# Patient Record
Sex: FEMALE | Race: WHITE | Employment: OTHER | URBAN - NONMETROPOLITAN AREA
[De-identification: names, ages, dates, MRNs, and addresses within clinical notes are randomized per-mention and may not be internally consistent; named-entity substitution may affect disease eponyms.]

---

## 2017-05-02 ENCOUNTER — NURSE ONLY (OUTPATIENT)
Dept: LAB | Age: 67
End: 2017-05-02
Payer: MEDICARE

## 2017-05-02 ENCOUNTER — OFFICE VISIT (OUTPATIENT)
Dept: FAMILY MEDICINE CLINIC | Age: 67
End: 2017-05-02
Payer: MEDICARE

## 2017-05-02 VITALS
HEIGHT: 61 IN | SYSTOLIC BLOOD PRESSURE: 118 MMHG | HEART RATE: 88 BPM | WEIGHT: 119 LBS | BODY MASS INDEX: 22.47 KG/M2 | DIASTOLIC BLOOD PRESSURE: 68 MMHG

## 2017-05-02 DIAGNOSIS — J30.1 SEASONAL ALLERGIC RHINITIS DUE TO POLLEN: ICD-10-CM

## 2017-05-02 DIAGNOSIS — K57.30 SIGMOID DIVERTICULOSIS: ICD-10-CM

## 2017-05-02 DIAGNOSIS — M54.50 CHRONIC MIDLINE LOW BACK PAIN WITHOUT SCIATICA: ICD-10-CM

## 2017-05-02 DIAGNOSIS — I47.1 SUPRAVENTRICULAR TACHYCARDIA (HCC): Primary | ICD-10-CM

## 2017-05-02 DIAGNOSIS — D18.09 HEMANGIOMA OF FACE: ICD-10-CM

## 2017-05-02 DIAGNOSIS — G47.33 OBSTRUCTIVE SLEEP APNEA: ICD-10-CM

## 2017-05-02 DIAGNOSIS — R73.01 IMPAIRED FASTING BLOOD SUGAR: ICD-10-CM

## 2017-05-02 DIAGNOSIS — G89.29 CHRONIC MIDLINE LOW BACK PAIN WITHOUT SCIATICA: ICD-10-CM

## 2017-05-02 DIAGNOSIS — E78.00 PURE HYPERCHOLESTEROLEMIA: ICD-10-CM

## 2017-05-02 DIAGNOSIS — Z23 NEED FOR VACCINATION: Primary | ICD-10-CM

## 2017-05-02 DIAGNOSIS — I10 ESSENTIAL HYPERTENSION: ICD-10-CM

## 2017-05-02 DIAGNOSIS — R42 DIZZINESS: ICD-10-CM

## 2017-05-02 PROCEDURE — 1123F ACP DISCUSS/DSCN MKR DOCD: CPT | Performed by: FAMILY MEDICINE

## 2017-05-02 PROCEDURE — 1036F TOBACCO NON-USER: CPT | Performed by: FAMILY MEDICINE

## 2017-05-02 PROCEDURE — G8419 CALC BMI OUT NRM PARAM NOF/U: HCPCS | Performed by: FAMILY MEDICINE

## 2017-05-02 PROCEDURE — 90471 IMMUNIZATION ADMIN: CPT | Performed by: FAMILY MEDICINE

## 2017-05-02 PROCEDURE — 1090F PRES/ABSN URINE INCON ASSESS: CPT | Performed by: FAMILY MEDICINE

## 2017-05-02 PROCEDURE — G8427 DOCREV CUR MEDS BY ELIG CLIN: HCPCS | Performed by: FAMILY MEDICINE

## 2017-05-02 PROCEDURE — 3017F COLORECTAL CA SCREEN DOC REV: CPT | Performed by: FAMILY MEDICINE

## 2017-05-02 PROCEDURE — 99214 OFFICE O/P EST MOD 30 MIN: CPT | Performed by: FAMILY MEDICINE

## 2017-05-02 PROCEDURE — 3014F SCREEN MAMMO DOC REV: CPT | Performed by: FAMILY MEDICINE

## 2017-05-02 PROCEDURE — G8399 PT W/DXA RESULTS DOCUMENT: HCPCS | Performed by: FAMILY MEDICINE

## 2017-05-02 PROCEDURE — 4040F PNEUMOC VAC/ADMIN/RCVD: CPT | Performed by: FAMILY MEDICINE

## 2017-05-02 ASSESSMENT — ENCOUNTER SYMPTOMS
EYES NEGATIVE: 1
ALLERGIC/IMMUNOLOGIC NEGATIVE: 1
GASTROINTESTINAL NEGATIVE: 1
RESPIRATORY NEGATIVE: 1

## 2017-05-09 ENCOUNTER — OFFICE VISIT (OUTPATIENT)
Dept: OTOLARYNGOLOGY | Age: 67
End: 2017-05-09
Payer: MEDICARE

## 2017-05-09 VITALS
TEMPERATURE: 98.1 F | SYSTOLIC BLOOD PRESSURE: 128 MMHG | HEIGHT: 61 IN | BODY MASS INDEX: 22.47 KG/M2 | WEIGHT: 119 LBS | DIASTOLIC BLOOD PRESSURE: 78 MMHG | HEART RATE: 74 BPM

## 2017-05-09 DIAGNOSIS — D18.09 HEMANGIOMA OF FACE: ICD-10-CM

## 2017-05-09 DIAGNOSIS — R49.0 DYSPHONIA: Primary | ICD-10-CM

## 2017-05-09 PROCEDURE — 1090F PRES/ABSN URINE INCON ASSESS: CPT | Performed by: OTOLARYNGOLOGY

## 2017-05-09 PROCEDURE — 3017F COLORECTAL CA SCREEN DOC REV: CPT | Performed by: OTOLARYNGOLOGY

## 2017-05-09 PROCEDURE — 3014F SCREEN MAMMO DOC REV: CPT | Performed by: OTOLARYNGOLOGY

## 2017-05-09 PROCEDURE — 1123F ACP DISCUSS/DSCN MKR DOCD: CPT | Performed by: OTOLARYNGOLOGY

## 2017-05-09 PROCEDURE — G8419 CALC BMI OUT NRM PARAM NOF/U: HCPCS | Performed by: OTOLARYNGOLOGY

## 2017-05-09 PROCEDURE — G8427 DOCREV CUR MEDS BY ELIG CLIN: HCPCS | Performed by: OTOLARYNGOLOGY

## 2017-05-09 PROCEDURE — 1036F TOBACCO NON-USER: CPT | Performed by: OTOLARYNGOLOGY

## 2017-05-09 PROCEDURE — 4040F PNEUMOC VAC/ADMIN/RCVD: CPT | Performed by: OTOLARYNGOLOGY

## 2017-05-09 PROCEDURE — 31575 DIAGNOSTIC LARYNGOSCOPY: CPT | Performed by: OTOLARYNGOLOGY

## 2017-05-09 PROCEDURE — G8399 PT W/DXA RESULTS DOCUMENT: HCPCS | Performed by: OTOLARYNGOLOGY

## 2017-05-09 PROCEDURE — 99202 OFFICE O/P NEW SF 15 MIN: CPT | Performed by: OTOLARYNGOLOGY

## 2017-05-21 ENCOUNTER — OFFICE VISIT (OUTPATIENT)
Dept: PRIMARY CARE CLINIC | Age: 67
End: 2017-05-21
Payer: MEDICARE

## 2017-05-21 VITALS
WEIGHT: 120 LBS | DIASTOLIC BLOOD PRESSURE: 80 MMHG | TEMPERATURE: 97.8 F | HEART RATE: 67 BPM | HEIGHT: 61 IN | SYSTOLIC BLOOD PRESSURE: 120 MMHG | BODY MASS INDEX: 22.66 KG/M2 | OXYGEN SATURATION: 98 % | RESPIRATION RATE: 14 BRPM

## 2017-05-21 DIAGNOSIS — J30.2 SEASONAL ALLERGIC RHINITIS, UNSPECIFIED ALLERGIC RHINITIS TRIGGER: Primary | ICD-10-CM

## 2017-05-21 PROCEDURE — G8419 CALC BMI OUT NRM PARAM NOF/U: HCPCS | Performed by: FAMILY MEDICINE

## 2017-05-21 PROCEDURE — 1036F TOBACCO NON-USER: CPT | Performed by: FAMILY MEDICINE

## 2017-05-21 PROCEDURE — G8427 DOCREV CUR MEDS BY ELIG CLIN: HCPCS | Performed by: FAMILY MEDICINE

## 2017-05-21 PROCEDURE — 1123F ACP DISCUSS/DSCN MKR DOCD: CPT | Performed by: FAMILY MEDICINE

## 2017-05-21 PROCEDURE — 99213 OFFICE O/P EST LOW 20 MIN: CPT | Performed by: FAMILY MEDICINE

## 2017-05-21 PROCEDURE — 3014F SCREEN MAMMO DOC REV: CPT | Performed by: FAMILY MEDICINE

## 2017-05-21 PROCEDURE — G8399 PT W/DXA RESULTS DOCUMENT: HCPCS | Performed by: FAMILY MEDICINE

## 2017-05-21 PROCEDURE — 3017F COLORECTAL CA SCREEN DOC REV: CPT | Performed by: FAMILY MEDICINE

## 2017-05-21 PROCEDURE — 4040F PNEUMOC VAC/ADMIN/RCVD: CPT | Performed by: FAMILY MEDICINE

## 2017-05-21 PROCEDURE — 1090F PRES/ABSN URINE INCON ASSESS: CPT | Performed by: FAMILY MEDICINE

## 2017-05-21 RX ORDER — LORATADINE 10 MG/1
10 TABLET ORAL DAILY
Qty: 30 TABLET | Refills: 3 | Status: SHIPPED | OUTPATIENT
Start: 2017-05-21 | End: 2017-11-01 | Stop reason: ALTCHOICE

## 2017-05-21 RX ORDER — FLUTICASONE PROPIONATE 50 MCG
1 SPRAY, SUSPENSION (ML) NASAL DAILY
Qty: 1 BOTTLE | Refills: 3 | Status: SHIPPED | OUTPATIENT
Start: 2017-05-21 | End: 2017-11-01 | Stop reason: ALTCHOICE

## 2017-05-21 ASSESSMENT — ENCOUNTER SYMPTOMS
CONSTIPATION: 0
WHEEZING: 0
TROUBLE SWALLOWING: 0
COUGH: 0
SORE THROAT: 0
VOMITING: 0
EYE DISCHARGE: 0
RHINORRHEA: 1
DIARRHEA: 0
SINUS PRESSURE: 1
SINUS COMPLAINT: 1
NAUSEA: 0
ABDOMINAL PAIN: 0
SHORTNESS OF BREATH: 0
EYE REDNESS: 0

## 2017-08-04 ENCOUNTER — TELEPHONE (OUTPATIENT)
Dept: OTOLARYNGOLOGY | Age: 67
End: 2017-08-04

## 2017-08-04 NOTE — TELEPHONE ENCOUNTER
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 She stated she did see Dr. Belén Patiño for which I saw a consult note in media suggesting that she see Dr. Adilene Turner to consult interventional embolization which should help prevent the hemangioma from continuing to grow . When I called Jerod Sandoval I was pleased to know that she was going to go see Dr. Terra Frost on Wed Aug 9th 2017 for the emobilization consult and let her know I would call her again soon to see how that went and when she will be having this done.

## 2017-08-18 NOTE — TELEPHONE ENCOUNTER
I spoke to Harriett Manzo and she states she will need a swallow study per Dr. Paris Mathias phone number [phone] to ask for Essence Machuca  They were to call me before 2 on Friday with an order for the swallow study so I can schedule this for her I received no call and attempted to call Essence Machuca and got voicemail .

## 2017-08-21 ENCOUNTER — TELEPHONE (OUTPATIENT)
Dept: FAMILY MEDICINE CLINIC | Age: 67
End: 2017-08-21

## 2017-08-22 NOTE — TELEPHONE ENCOUNTER
Spoke to Cristian Gibbs at [phone]. He stated Shama Stephens was not in today but would have her call me Wednesday 8/23/17 when she was.

## 2017-08-29 ENCOUNTER — HOSPITAL ENCOUNTER (OUTPATIENT)
Dept: GENERAL RADIOLOGY | Age: 67
Discharge: HOME | End: 2017-08-29
Payer: MEDICARE

## 2017-08-29 ENCOUNTER — HOSPITAL ENCOUNTER (OUTPATIENT)
Dept: SPEECH THERAPY | Age: 67
Setting detail: THERAPIES SERIES
Discharge: HOME | End: 2017-08-29
Payer: MEDICARE

## 2017-08-29 DIAGNOSIS — R13.10 DYSPHAGIA, UNSPECIFIED TYPE: ICD-10-CM

## 2017-08-29 DIAGNOSIS — T88.4XXA: ICD-10-CM

## 2017-08-29 DIAGNOSIS — Q27.9 VASCULAR ANOMALIES, CONGENITAL: ICD-10-CM

## 2017-08-29 DIAGNOSIS — Q27.9 VENOUS MALFORMATION: Primary | ICD-10-CM

## 2017-08-29 PROCEDURE — G8997 SWALLOW GOAL STATUS: HCPCS | Performed by: SPEECH-LANGUAGE PATHOLOGIST

## 2017-08-29 PROCEDURE — 74230 X-RAY XM SWLNG FUNCJ C+: CPT

## 2017-08-29 PROCEDURE — G8998 SWALLOW D/C STATUS: HCPCS | Performed by: SPEECH-LANGUAGE PATHOLOGIST

## 2017-08-29 PROCEDURE — G8996 SWALLOW CURRENT STATUS: HCPCS | Performed by: SPEECH-LANGUAGE PATHOLOGIST

## 2017-08-29 PROCEDURE — 92611 MOTION FLUOROSCOPY/SWALLOW: CPT | Performed by: SPEECH-LANGUAGE PATHOLOGIST

## 2017-09-18 ENCOUNTER — NURSE ONLY (OUTPATIENT)
Dept: LAB | Age: 67
End: 2017-09-18
Payer: MEDICARE

## 2017-09-18 DIAGNOSIS — Z23 NEED FOR VACCINATION: Primary | ICD-10-CM

## 2017-09-18 PROCEDURE — G0008 ADMIN INFLUENZA VIRUS VAC: HCPCS | Performed by: FAMILY MEDICINE

## 2017-09-18 PROCEDURE — 90662 IIV NO PRSV INCREASED AG IM: CPT | Performed by: FAMILY MEDICINE

## 2017-09-18 PROCEDURE — 99999 PR OFFICE/OUTPT VISIT,PROCEDURE ONLY: CPT | Performed by: FAMILY MEDICINE

## 2017-10-23 ENCOUNTER — TELEPHONE (OUTPATIENT)
Dept: FAMILY MEDICINE CLINIC | Age: 67
End: 2017-10-23

## 2017-10-23 NOTE — TELEPHONE ENCOUNTER
Pt calling stating that she needs to speak with Gerald Reyez about getting treatment in [place]. Pt will go into any further detail with writer. Please call pt and advise.

## 2017-10-24 NOTE — TELEPHONE ENCOUNTER
Returned call-patient requesting a handicap placard script and a letter for [employer] for assistance in garbage  d/t up coming surgery-will mail to patient

## 2017-11-01 ENCOUNTER — OFFICE VISIT (OUTPATIENT)
Dept: PRIMARY CARE CLINIC | Age: 67
End: 2017-11-01
Payer: MEDICARE

## 2017-11-01 VITALS
HEIGHT: 61 IN | WEIGHT: 117.4 LBS | RESPIRATION RATE: 14 BRPM | TEMPERATURE: 99.3 F | SYSTOLIC BLOOD PRESSURE: 124 MMHG | HEART RATE: 88 BPM | OXYGEN SATURATION: 97 % | DIASTOLIC BLOOD PRESSURE: 86 MMHG | BODY MASS INDEX: 22.16 KG/M2

## 2017-11-01 DIAGNOSIS — J06.9 UPPER RESPIRATORY TRACT INFECTION, UNSPECIFIED TYPE: Primary | ICD-10-CM

## 2017-11-01 PROCEDURE — G8484 FLU IMMUNIZE NO ADMIN: HCPCS | Performed by: FAMILY MEDICINE

## 2017-11-01 PROCEDURE — 1090F PRES/ABSN URINE INCON ASSESS: CPT | Performed by: FAMILY MEDICINE

## 2017-11-01 PROCEDURE — 3017F COLORECTAL CA SCREEN DOC REV: CPT | Performed by: FAMILY MEDICINE

## 2017-11-01 PROCEDURE — 1123F ACP DISCUSS/DSCN MKR DOCD: CPT | Performed by: FAMILY MEDICINE

## 2017-11-01 PROCEDURE — 3014F SCREEN MAMMO DOC REV: CPT | Performed by: FAMILY MEDICINE

## 2017-11-01 PROCEDURE — 1036F TOBACCO NON-USER: CPT | Performed by: FAMILY MEDICINE

## 2017-11-01 PROCEDURE — 99213 OFFICE O/P EST LOW 20 MIN: CPT | Performed by: FAMILY MEDICINE

## 2017-11-01 PROCEDURE — 4040F PNEUMOC VAC/ADMIN/RCVD: CPT | Performed by: FAMILY MEDICINE

## 2017-11-01 PROCEDURE — G8427 DOCREV CUR MEDS BY ELIG CLIN: HCPCS | Performed by: FAMILY MEDICINE

## 2017-11-01 PROCEDURE — G8399 PT W/DXA RESULTS DOCUMENT: HCPCS | Performed by: FAMILY MEDICINE

## 2017-11-01 PROCEDURE — G8420 CALC BMI NORM PARAMETERS: HCPCS | Performed by: FAMILY MEDICINE

## 2017-11-01 RX ORDER — AZITHROMYCIN 250 MG/1
TABLET, FILM COATED ORAL
Qty: 1 PACKET | Refills: 0 | Status: SHIPPED | OUTPATIENT
Start: 2017-11-01 | End: 2017-11-05

## 2017-11-01 NOTE — PROGRESS NOTES
[facility]             [address] [place] [address], [address]                        Telephone [phone]             Fax [phone]       Kiah Manriquez  1950  MRN:  [id number]  Date of visit:  11/1/17    Subjective:    Kiah Manriquez is a 69 y.o.  female who presents to [facility] today (11/1/17) for evaluation of:  Nasal Congestion (with yellow colored nasal  drainage,started yesterday)      She states that she has had nasal congestion and drainage since yesterday. She states that she feels warm, but she did not check her temperature at home. She is anxious about getting better as soon as possible. She has surgery scheduled on 11/14/2017. 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 Current medications are:  Current Outpatient Prescriptions   Medication Sig Dispense Refill    Handicap Placard MISC by Does not apply route Expires: 10/24/2022 1 each 0    Psyllium (METAMUCIL FIBER PO) Take by mouth      DIGESTIVE AIDS MIXTURE PO Take 1 capsule by mouth 2 times daily        No current facility-administered medications for this visit. She is allergic to penicillins; sulfa antibiotics; and e-mycin [erythromycin]. She has the following problem list:  Patient Active Problem List   Diagnosis    Mitral valve prolapse    Supraventricular tachycardia ([address]    Hypertension    Hyperlipidemia    Allergic rhinitis    Impaired fasting blood sugar    Sigmoid diverticulosis    Obstructive sleep apnea    Cataract, post subcapsular polar senile    Hemangioma of face        She  reports that she has never smoked.  She has never used smokeless tobacco.      Objective:    Vitals:    11/01/17 1039   BP: 124/86   Pulse: 88   Resp: 14   Temp: 99.3 °F (37.4 °C)   SpO2: 97%     SpO2: 97 %       Body mass

## 2017-11-27 ENCOUNTER — TELEPHONE (OUTPATIENT)
Dept: FAMILY MEDICINE CLINIC | Age: 67
End: 2017-11-27

## 2017-11-27 NOTE — TELEPHONE ENCOUNTER
Pt calling stating she needs to be worked in with [facility] ASAP, states she had a trach put in and needs to discuss this situation with him, please call pt at above number with appt.

## 2017-11-28 ENCOUNTER — TELEPHONE (OUTPATIENT)
Dept: FAMILY MEDICINE CLINIC | Age: 67
End: 2017-11-28
Payer: MEDICARE

## 2017-11-28 DIAGNOSIS — R47.89 OTHER SPEECH DISTURBANCES (CODE): ICD-10-CM

## 2017-11-28 DIAGNOSIS — Z93.0 TRACHEOSTOMY STATUS (HCC): ICD-10-CM

## 2017-11-28 DIAGNOSIS — Z43.0 ATTENTION TO TRACHEOSTOMY (HCC): ICD-10-CM

## 2017-11-28 DIAGNOSIS — Q27.39 ARTERIOVENOUS MALFORMATION, OTHER SITE: Primary | ICD-10-CM

## 2017-11-28 DIAGNOSIS — I05.9 RHEUMATIC DISEASE OF MITRAL VALVE: ICD-10-CM

## 2017-11-28 DIAGNOSIS — Z43.9: ICD-10-CM

## 2017-11-28 PROCEDURE — G0180 MD CERTIFICATION HHA PATIENT: HCPCS | Performed by: FAMILY MEDICINE

## 2017-11-28 RX ORDER — DOCUSATE SODIUM 100 MG/1
100 CAPSULE, LIQUID FILLED ORAL 2 TIMES DAILY PRN
COMMUNITY
End: 2017-12-04 | Stop reason: ALTCHOICE

## 2017-11-28 RX ORDER — OXYCODONE HYDROCHLORIDE 5 MG/1
5 TABLET ORAL EVERY 4 HOURS PRN
COMMUNITY
Start: 2017-11-17 | End: 2017-12-04 | Stop reason: ALTCHOICE

## 2017-11-28 NOTE — TELEPHONE ENCOUNTER
Home health plan of care reviewed and certification completed 11/28/17 on patient for service dates 11/19/17 to 1/16/18. Verified current medications. Physician time spent on activities to coordinate service, documenting, medical decision making, review of reports/treatment plans/test results is 15 minutes.

## 2017-12-04 ENCOUNTER — OFFICE VISIT (OUTPATIENT)
Dept: FAMILY MEDICINE CLINIC | Age: 67
End: 2017-12-04
Payer: MEDICARE

## 2017-12-04 VITALS
SYSTOLIC BLOOD PRESSURE: 124 MMHG | WEIGHT: 115 LBS | HEART RATE: 68 BPM | HEIGHT: 61 IN | DIASTOLIC BLOOD PRESSURE: 70 MMHG | BODY MASS INDEX: 21.71 KG/M2

## 2017-12-04 DIAGNOSIS — Z43.0 ATTENTION TO TRACHEOSTOMY (HCC): ICD-10-CM

## 2017-12-04 DIAGNOSIS — Q27.39 ARTERIOVENOUS MALFORMATION, OTHER SITE: Primary | ICD-10-CM

## 2017-12-04 DIAGNOSIS — I10 ESSENTIAL HYPERTENSION: ICD-10-CM

## 2017-12-04 DIAGNOSIS — G47.33 OBSTRUCTIVE SLEEP APNEA: ICD-10-CM

## 2017-12-04 PROCEDURE — G8484 FLU IMMUNIZE NO ADMIN: HCPCS | Performed by: FAMILY MEDICINE

## 2017-12-04 PROCEDURE — 3017F COLORECTAL CA SCREEN DOC REV: CPT | Performed by: FAMILY MEDICINE

## 2017-12-04 PROCEDURE — G8420 CALC BMI NORM PARAMETERS: HCPCS | Performed by: FAMILY MEDICINE

## 2017-12-04 PROCEDURE — 4040F PNEUMOC VAC/ADMIN/RCVD: CPT | Performed by: FAMILY MEDICINE

## 2017-12-04 PROCEDURE — 1090F PRES/ABSN URINE INCON ASSESS: CPT | Performed by: FAMILY MEDICINE

## 2017-12-04 PROCEDURE — 1123F ACP DISCUSS/DSCN MKR DOCD: CPT | Performed by: FAMILY MEDICINE

## 2017-12-04 PROCEDURE — 3014F SCREEN MAMMO DOC REV: CPT | Performed by: FAMILY MEDICINE

## 2017-12-04 PROCEDURE — G8399 PT W/DXA RESULTS DOCUMENT: HCPCS | Performed by: FAMILY MEDICINE

## 2017-12-04 PROCEDURE — 99214 OFFICE O/P EST MOD 30 MIN: CPT | Performed by: FAMILY MEDICINE

## 2017-12-04 PROCEDURE — G8427 DOCREV CUR MEDS BY ELIG CLIN: HCPCS | Performed by: FAMILY MEDICINE

## 2017-12-04 PROCEDURE — 1036F TOBACCO NON-USER: CPT | Performed by: FAMILY MEDICINE

## 2017-12-04 ASSESSMENT — PATIENT HEALTH QUESTIONNAIRE - PHQ9
1. LITTLE INTEREST OR PLEASURE IN DOING THINGS: 0
SUM OF ALL RESPONSES TO PHQ QUESTIONS 1-9: 0
SUM OF ALL RESPONSES TO PHQ9 QUESTIONS 1 & 2: 0
2. FEELING DOWN, DEPRESSED OR HOPELESS: 0

## 2017-12-04 ASSESSMENT — ENCOUNTER SYMPTOMS
ALLERGIC/IMMUNOLOGIC NEGATIVE: 1
EYES NEGATIVE: 1
TROUBLE SWALLOWING: 0
RESPIRATORY NEGATIVE: 1
GASTROINTESTINAL NEGATIVE: 1

## 2017-12-04 NOTE — PROGRESS NOTES
There is no tenderness. There is no rebound and no guarding. Musculoskeletal: Normal range of motion. She exhibits no edema or tenderness. Lymphadenopathy:     She has no cervical adenopathy. Neurological: She is alert and oriented to person, place, and time. Skin: Skin is warm and dry. No rash noted. No erythema. Psychiatric: She has a normal mood and affect. Her behavior is normal. Judgment and thought content normal.     /70 (Site: Right Arm, Position: Sitting, Cuff Size: Small Adult)   Pulse 68   Ht 5' 0.98\" (1.549 m)   Wt 115 lb (52.2 kg)   LMP 05/06/2000   BMI 21.74 kg/m²     Assessment:       Encounter Diagnoses   Name Primary?  Arteriovenous malformation, other site Yes    Attention to tracheostomy [facility])     Essential hypertension     Obstructive sleep apnea            Plan:      AVM of face, lip, tongue. Almost ready for emobolization procedures. She has trach. In now. Needs a peg tube. Planning staged procedures. Half of tongue, then other half, then lower lip, then lower face. Anticipate 5+ procedures at [facility] neuro radiology interventional clinic starting in January. Anticipating 8-10 weeks between treatments. Tracheostomy 38/48TJ without complication. She is doing well with trach. Care, has home health nursing following her closely. Htn: doing well at present with good control. No active treatment at present. Albert: now using trach. Mask with cpap by report. She denies somnolence issues. Wishing her good luck with procedures. She is excited to start.

## 2017-12-29 ENCOUNTER — OFFICE VISIT (OUTPATIENT)
Dept: PRIMARY CARE CLINIC | Age: 67
End: 2017-12-29
Payer: MEDICARE

## 2017-12-29 VITALS
HEIGHT: 61 IN | HEART RATE: 84 BPM | SYSTOLIC BLOOD PRESSURE: 118 MMHG | BODY MASS INDEX: 22.47 KG/M2 | WEIGHT: 119 LBS | DIASTOLIC BLOOD PRESSURE: 64 MMHG | TEMPERATURE: 97.7 F | OXYGEN SATURATION: 94 %

## 2017-12-29 DIAGNOSIS — M54.6 ACUTE RIGHT-SIDED THORACIC BACK PAIN: Primary | ICD-10-CM

## 2017-12-29 PROCEDURE — 1036F TOBACCO NON-USER: CPT | Performed by: FAMILY MEDICINE

## 2017-12-29 PROCEDURE — 1090F PRES/ABSN URINE INCON ASSESS: CPT | Performed by: FAMILY MEDICINE

## 2017-12-29 PROCEDURE — 3014F SCREEN MAMMO DOC REV: CPT | Performed by: FAMILY MEDICINE

## 2017-12-29 PROCEDURE — G8427 DOCREV CUR MEDS BY ELIG CLIN: HCPCS | Performed by: FAMILY MEDICINE

## 2017-12-29 PROCEDURE — 3017F COLORECTAL CA SCREEN DOC REV: CPT | Performed by: FAMILY MEDICINE

## 2017-12-29 PROCEDURE — 99214 OFFICE O/P EST MOD 30 MIN: CPT | Performed by: FAMILY MEDICINE

## 2017-12-29 PROCEDURE — 1123F ACP DISCUSS/DSCN MKR DOCD: CPT | Performed by: FAMILY MEDICINE

## 2017-12-29 PROCEDURE — G8399 PT W/DXA RESULTS DOCUMENT: HCPCS | Performed by: FAMILY MEDICINE

## 2017-12-29 PROCEDURE — G8420 CALC BMI NORM PARAMETERS: HCPCS | Performed by: FAMILY MEDICINE

## 2017-12-29 PROCEDURE — G8484 FLU IMMUNIZE NO ADMIN: HCPCS | Performed by: FAMILY MEDICINE

## 2017-12-29 PROCEDURE — 4040F PNEUMOC VAC/ADMIN/RCVD: CPT | Performed by: FAMILY MEDICINE

## 2017-12-29 RX ORDER — BACLOFEN 10 MG/1
10 TABLET ORAL NIGHTLY PRN
Qty: 30 TABLET | Refills: 0 | Status: SHIPPED | OUTPATIENT
Start: 2017-12-29 | End: 2018-04-10 | Stop reason: ALTCHOICE

## 2017-12-29 ASSESSMENT — ENCOUNTER SYMPTOMS
BACK PAIN: 1
DIARRHEA: 0
BOWEL INCONTINENCE: 0
ABDOMINAL PAIN: 0
CONSTIPATION: 0

## 2017-12-29 NOTE — PATIENT INSTRUCTIONS
Patient Education        Healthy Upper Back: Exercises  Your Care Instructions  Here are some examples of exercises for your upper back. Start each exercise slowly. Ease off the exercise if you start to have pain. Your doctor or physical therapist will tell you when you can start these exercises and which ones will work best for you. How to do the exercises  Lower neck and upper back stretch    1. Stretch your arms out in front of your body. Clasp one hand on top of your other hand. 2. Gently reach out so that you feel your shoulder blades stretching away from each other. 3. Gently bend your head forward. 4. Hold for 15 to 30 seconds. 5. Repeat 2 to 4 times. Midback stretch    If you have knee pain, do not do this exercise. 1. Kneel on the floor, and sit back on your ankles. 2. Lean forward, place your hands on the floor, and stretch your arms out in front of you. Rest your head between your arms. 3. Gently push your chest toward the floor, reaching as far in front of you as possible. 4. Hold for 15 to 30 seconds. 5. Repeat 2 to 4 times. Shoulder rolls    1. Sit comfortably with your feet shoulder-width apart. You can also do this exercise while standing. 2. Roll your shoulders up, then back, and then down in a smooth, circular motion. 3. Repeat 2 to 4 times. Wall push-up    1. Stand against a wall with your feet about 12 to 24 inches back from the wall. If you feel any pain when you do this exercise, stand closer to the wall. 2. Place your hands on the wall slightly wider apart than your shoulders, and lean forward. 3. Gently lean your body toward the wall. Then push back to your starting position. Keep the motion smooth and controlled. 4. Repeat 8 to 12 times. Resisted shoulder blade squeeze    For this exercise, you will need elastic exercise material, such as surgical tubing or Thera-Band. 1. Sit or stand, holding the band in both hands in front of you.  Keep your elbows close to your

## 2018-01-09 ENCOUNTER — HOSPITAL ENCOUNTER (EMERGENCY)
Age: 68
Discharge: HOME | End: 2018-01-09
Attending: EMERGENCY MEDICINE
Payer: MEDICARE

## 2018-01-09 VITALS
OXYGEN SATURATION: 98 % | WEIGHT: 122 LBS | HEART RATE: 74 BPM | BODY MASS INDEX: 23.03 KG/M2 | RESPIRATION RATE: 15 BRPM | HEIGHT: 61 IN | SYSTOLIC BLOOD PRESSURE: 122 MMHG | DIASTOLIC BLOOD PRESSURE: 57 MMHG | TEMPERATURE: 98.2 F

## 2018-01-09 DIAGNOSIS — T85.598A FEEDING TUBE DYSFUNCTION, INITIAL ENCOUNTER: Primary | ICD-10-CM

## 2018-01-09 PROCEDURE — 99282 EMERGENCY DEPT VISIT SF MDM: CPT

## 2018-01-15 ENCOUNTER — TELEPHONE (OUTPATIENT)
Dept: FAMILY MEDICINE CLINIC | Age: 68
End: 2018-01-15

## 2018-01-15 RX ORDER — ACETAMINOPHEN 160 MG/5ML
500 SUSPENSION, ORAL (FINAL DOSE FORM) ORAL
COMMUNITY
Start: 2018-01-09 | End: 2018-01-15 | Stop reason: SDUPTHER

## 2018-01-15 RX ORDER — ACETAMINOPHEN 160 MG/5ML
500 SUSPENSION, ORAL (FINAL DOSE FORM) ORAL EVERY 4 HOURS PRN
Qty: 500 ML | Refills: 3 | Status: SHIPPED | OUTPATIENT
Start: 2018-01-15 | End: 2018-01-17 | Stop reason: SDUPTHER

## 2018-01-15 NOTE — TELEPHONE ENCOUNTER
Returned patients call. Wanting to increase food consumption.   Advised her to call physicians in [place] for guidance

## 2018-01-17 ENCOUNTER — OFFICE VISIT (OUTPATIENT)
Dept: FAMILY MEDICINE CLINIC | Age: 68
End: 2018-01-17
Payer: MEDICARE

## 2018-01-17 ENCOUNTER — HOSPITAL ENCOUNTER (EMERGENCY)
Age: 68
Discharge: HOME | End: 2018-01-18
Attending: EMERGENCY MEDICINE
Payer: MEDICARE

## 2018-01-17 VITALS
HEART RATE: 76 BPM | SYSTOLIC BLOOD PRESSURE: 116 MMHG | BODY MASS INDEX: 21.18 KG/M2 | WEIGHT: 112.2 LBS | DIASTOLIC BLOOD PRESSURE: 80 MMHG | HEIGHT: 61 IN

## 2018-01-17 VITALS
DIASTOLIC BLOOD PRESSURE: 66 MMHG | OXYGEN SATURATION: 99 % | HEART RATE: 109 BPM | SYSTOLIC BLOOD PRESSURE: 136 MMHG | TEMPERATURE: 97.7 F | RESPIRATION RATE: 16 BRPM

## 2018-01-17 DIAGNOSIS — Q27.39 ARTERIOVENOUS MALFORMATION, OTHER SITE: Primary | ICD-10-CM

## 2018-01-17 DIAGNOSIS — Z93.1 S/P PERCUTANEOUS ENDOSCOPIC GASTROSTOMY (PEG) TUBE PLACEMENT (HCC): ICD-10-CM

## 2018-01-17 DIAGNOSIS — Z93.0 TRACHEOSTOMY STATUS (HCC): ICD-10-CM

## 2018-01-17 DIAGNOSIS — K94.23 LEAKING PERCUTANEOUS ENDOSCOPIC GASTROSTOMY (PEG) TUBE (HCC): Primary | ICD-10-CM

## 2018-01-17 PROCEDURE — 1036F TOBACCO NON-USER: CPT | Performed by: FAMILY MEDICINE

## 2018-01-17 PROCEDURE — 1090F PRES/ABSN URINE INCON ASSESS: CPT | Performed by: FAMILY MEDICINE

## 2018-01-17 PROCEDURE — G8427 DOCREV CUR MEDS BY ELIG CLIN: HCPCS | Performed by: FAMILY MEDICINE

## 2018-01-17 PROCEDURE — 1123F ACP DISCUSS/DSCN MKR DOCD: CPT | Performed by: FAMILY MEDICINE

## 2018-01-17 PROCEDURE — 4040F PNEUMOC VAC/ADMIN/RCVD: CPT | Performed by: FAMILY MEDICINE

## 2018-01-17 PROCEDURE — 3017F COLORECTAL CA SCREEN DOC REV: CPT | Performed by: FAMILY MEDICINE

## 2018-01-17 PROCEDURE — 3014F SCREEN MAMMO DOC REV: CPT | Performed by: FAMILY MEDICINE

## 2018-01-17 PROCEDURE — 99214 OFFICE O/P EST MOD 30 MIN: CPT | Performed by: FAMILY MEDICINE

## 2018-01-17 PROCEDURE — 99282 EMERGENCY DEPT VISIT SF MDM: CPT

## 2018-01-17 PROCEDURE — G8484 FLU IMMUNIZE NO ADMIN: HCPCS | Performed by: FAMILY MEDICINE

## 2018-01-17 PROCEDURE — G8420 CALC BMI NORM PARAMETERS: HCPCS | Performed by: FAMILY MEDICINE

## 2018-01-17 PROCEDURE — G8399 PT W/DXA RESULTS DOCUMENT: HCPCS | Performed by: FAMILY MEDICINE

## 2018-01-17 RX ORDER — POLYVINYL ALCOHOL 14 MG/ML
SOLUTION/ DROPS OPHTHALMIC
COMMUNITY
Start: 2018-01-09 | End: 2018-04-10 | Stop reason: ALTCHOICE

## 2018-01-17 RX ORDER — ACETAMINOPHEN 160 MG/5ML
500 SUSPENSION, ORAL (FINAL DOSE FORM) ORAL EVERY 4 HOURS PRN
Qty: 500 ML | Refills: 3 | Status: SHIPPED | OUTPATIENT
Start: 2018-01-17 | End: 2018-04-30 | Stop reason: ALTCHOICE

## 2018-01-17 RX ORDER — MINERAL OIL AND WHITE PETROLATUM 150; 830 MG/G; MG/G
OINTMENT OPHTHALMIC
COMMUNITY
Start: 2018-01-09 | End: 2018-04-10 | Stop reason: ALTCHOICE

## 2018-01-17 RX ORDER — POLYMYXIN B SULFATE AND TRIMETHOPRIM 1; 10000 MG/ML; [USP'U]/ML
SOLUTION OPHTHALMIC
COMMUNITY
Start: 2018-01-09 | End: 2018-04-10 | Stop reason: ALTCHOICE

## 2018-01-17 ASSESSMENT — PAIN DESCRIPTION - ONSET: ONSET: ON-GOING

## 2018-01-17 ASSESSMENT — ENCOUNTER SYMPTOMS
VOICE CHANGE: 0
TROUBLE SWALLOWING: 1
RHINORRHEA: 1
COUGH: 1
ALLERGIC/IMMUNOLOGIC NEGATIVE: 1
GASTROINTESTINAL NEGATIVE: 1
EYES NEGATIVE: 1

## 2018-01-17 ASSESSMENT — PAIN DESCRIPTION - PAIN TYPE: TYPE: ACUTE PAIN

## 2018-01-17 ASSESSMENT — PAIN DESCRIPTION - DESCRIPTORS: DESCRIPTORS: SORE

## 2018-01-17 ASSESSMENT — PAIN DESCRIPTION - ORIENTATION: ORIENTATION: LEFT

## 2018-01-17 ASSESSMENT — PAIN DESCRIPTION - LOCATION: LOCATION: ABDOMEN

## 2018-01-17 ASSESSMENT — PAIN SCALES - GENERAL: PAINLEVEL_OUTOF10: 3

## 2018-01-17 ASSESSMENT — PAIN DESCRIPTION - FREQUENCY: FREQUENCY: CONTINUOUS

## 2018-01-17 NOTE — PROGRESS NOTES
her discomfort. She will also be able to clean and dry skin better underneath the button. Not eating is bothering her, especially since she thinks she could eat just fine. Will have ST perform swallowing study to see if she is able to eat at least some soft foods. Corneal abrasion. She had gel in her eyes for protection during procedure. Not sure how the abrasion happened. Has eye drops she is using at present.

## 2018-01-18 ASSESSMENT — ENCOUNTER SYMPTOMS
SHORTNESS OF BREATH: 0
VOMITING: 0
NAUSEA: 0

## 2018-01-18 NOTE — ED PROVIDER NOTES
with implant (2016); and tracheostomy (2017). CURRENT MEDICATIONS       Discharge Medication List as of 2018 12:04 AM      CONTINUE these medications which have NOT CHANGED    Details   acetaminophen (TYLENOL) 160 MG/5ML suspension Take 15.63 mLs by mouth every 4 hours as needed for Fever, Disp-500 mL, R-3Normal      trimethoprim-polymyxin b (POLYTRIM) 60869-3.1 UNIT/ML-% ophthalmic solution Place 1 drop in the right eye three times daily. Historical Med      polyvinyl alcohol (LIQUIFILM TEARS) 1.4 % ophthalmic solution Place 1 drop in the right eye four times daily. Historical Med      lubrifresh P.M. (ARTIFICIAL TEARS) ophthalmic ointment Place 1 application in the right eye at bedtime as needed for dry eyes. , Historical Med      baclofen (LIORESAL) 10 MG tablet Take 1 tablet by mouth nightly as needed (muscle spasm), Disp-30 tablet, R-0Normal      Psyllium (METAMUCIL FIBER PO) Take by mouthHistorical Med             ALLERGIES     is allergic to penicillins; sulfa antibiotics; and e-mycin [erythromycin]. FAMILY HISTORY     indicated that her mother is . She indicated that her father is . She indicated that her maternal grandmother is . She indicated that her maternal grandfather is . She indicated that her paternal grandmother is . She indicated that her paternal grandfather is . She indicated that the status of her other is unknown.      family history includes Diabetes in an other family member; Glaucoma in her mother; Heart Disease in her mother; Pacemaker in her mother. SOCIAL HISTORY      reports that she has never smoked. She has never used smokeless tobacco. She reports that she does not drink alcohol or use drugs. PHYSICAL EXAM     INITIAL VITALS:  tympanic temperature is 97.7 °F (36.5 °C). Her blood pressure is 136/66 and her pulse is 109. Her respiration is 16 and oxygen saturation is 99%.       Physical Exam   Constitutional: She

## 2018-01-19 ENCOUNTER — TELEPHONE (OUTPATIENT)
Dept: FAMILY MEDICINE CLINIC | Age: 68
End: 2018-01-19
Payer: MEDICARE

## 2018-01-19 ENCOUNTER — TELEPHONE (OUTPATIENT)
Dept: FAMILY MEDICINE CLINIC | Age: 68
End: 2018-01-19

## 2018-01-19 DIAGNOSIS — R47.02 DYSPHASIA: Primary | ICD-10-CM

## 2018-01-19 DIAGNOSIS — Z43.1 ATTENTION TO GASTROSTOMY (HCC): ICD-10-CM

## 2018-01-19 DIAGNOSIS — Z93.0 TRACHEOSTOMY STATUS (HCC): ICD-10-CM

## 2018-01-19 DIAGNOSIS — I05.9 RHEUMATIC DISEASE OF MITRAL VALVE: ICD-10-CM

## 2018-01-19 DIAGNOSIS — Q27.39 ARTERIOVENOUS MALFORMATION, OTHER SITE: Primary | ICD-10-CM

## 2018-01-19 DIAGNOSIS — Z43.9: ICD-10-CM

## 2018-01-19 DIAGNOSIS — R47.9 SPEECH DYSFUNCTION: ICD-10-CM

## 2018-01-19 PROCEDURE — G0180 MD CERTIFICATION HHA PATIENT: HCPCS | Performed by: FAMILY MEDICINE

## 2018-01-19 NOTE — TELEPHONE ENCOUNTER
Home health plan of care reviewed and certification completed 1/19/18 on patient for service dates 1/10/18 to 3/10/18. Verified current medications. Physician time spent on activities to coordinate service, documenting, medical decision making, review of reports/treatment plans/test results is 15 minutes.

## 2018-01-22 ENCOUNTER — TELEPHONE (OUTPATIENT)
Dept: FAMILY MEDICINE CLINIC | Age: 68
End: 2018-01-22

## 2018-01-22 NOTE — TELEPHONE ENCOUNTER
Dalia from [address] calling stating pt's skin is open and there is yellow drainage around the G-tube, states pt was recommended a cream to use on this by GO at visit on 1-17 but doesn't remember what it was called, please advise dalia the name of this cream at [phone]

## 2018-01-22 NOTE — TELEPHONE ENCOUNTER
Rec. Betadine or isopropyl alcohol for cleansing and decreasing bacterial contamination. Rec. Dry dressing similar to her trache tube to catch moisture. Can use a barrier cream like zinc oxide to protect from chronic moisture. Can use some hydrocortisone cream briefly for existing irritation and chaffing.

## 2018-01-23 ENCOUNTER — HOSPITAL ENCOUNTER (OUTPATIENT)
Dept: SPEECH THERAPY | Age: 68
Setting detail: THERAPIES SERIES
Discharge: HOME | End: 2018-01-23
Payer: COMMERCIAL

## 2018-01-23 ENCOUNTER — HOSPITAL ENCOUNTER (OUTPATIENT)
Dept: GENERAL RADIOLOGY | Age: 68
Discharge: HOME | End: 2018-01-23
Payer: MEDICARE

## 2018-01-23 DIAGNOSIS — R13.10 DYSPHAGIA, UNSPECIFIED TYPE: ICD-10-CM

## 2018-01-23 DIAGNOSIS — Q27.9 VENOUS MALFORMATION: Primary | ICD-10-CM

## 2018-01-23 DIAGNOSIS — R47.02 DYSPHASIA: ICD-10-CM

## 2018-01-23 PROCEDURE — 2500000003 HC RX 250 WO HCPCS: Performed by: FAMILY MEDICINE

## 2018-01-23 PROCEDURE — 74230 X-RAY XM SWLNG FUNCJ C+: CPT

## 2018-01-23 PROCEDURE — G8996 SWALLOW CURRENT STATUS: HCPCS | Performed by: SPEECH-LANGUAGE PATHOLOGIST

## 2018-01-23 PROCEDURE — 92611 MOTION FLUOROSCOPY/SWALLOW: CPT | Performed by: SPEECH-LANGUAGE PATHOLOGIST

## 2018-01-23 PROCEDURE — G8998 SWALLOW D/C STATUS: HCPCS | Performed by: SPEECH-LANGUAGE PATHOLOGIST

## 2018-01-23 PROCEDURE — G8997 SWALLOW GOAL STATUS: HCPCS | Performed by: SPEECH-LANGUAGE PATHOLOGIST

## 2018-01-23 RX ADMIN — BARIUM SULFATE 140 ML: 980 POWDER, FOR SUSPENSION ORAL at 14:04

## 2018-01-23 NOTE — PROGRESS NOTES
from the trachea despite effort  [] 8 = Material enters the airway, passes below the vocal folds, and no effort is made to eject    ESOPHAGEAL PHASE:   [x] No significant findings  [] See Radiology Report for details  [] Recommend further esophageal testing    ATTEMPTED TECHNIQUES:                                 Comments:  []Small bolus size [] Effective []Not Effective    []Straw [] Effective []Not Effective    []Cup [] Effective []Not Effective    []Chin tuck [] Effective []Not Effective    []Head Turn [] Effective []Not Effective    []Spoon presentations [] Effective []Not Effective    [] Volitional cough [] Effective []Not Effective    []Spontaneous cough [] Effective []Not Effective    [x]OTHER:  Volitional swallow [x] Effective []Not Effective Reduces amount of stasis     DIAGNOSTIC IMPRESSIONS:  Patient presents with oral preparatory and oral phase WFL, some increased mastication time needed d/t dentition. Patient presents with mild pharyngeal dysphagia as indicated by reduced hyolaryngeal excursion and reduced pharyngeal contraction resulting in trace to minimal stasis throughout pharynx, which is reduced with double swallow. No penetration, no aspiration. Patient is at minimal risk of aspiration and pulmonary compromise.       DIET RECOMMENDATIONS:      [] NPO  [x] Regular [] Soft  [] Soft with ground meat [] Puree  [] Dysphagia Level 1 [] Dysphagia Level 2 [] Dysphagia Level 3    LIQUID RECOMMENDATIONS:   [x] Thin liquid [] Nectar thick liquid [] Honey thick liquid [] Pudding thick liquid    STRATEGIES:   [x] Full upright position  [x] Small bite/sip   [] No Straw   [x] Multiple Swallow  [] Chin tuck   [] Head turn   [] Pulmonary monitoring [] Oral care after all meals  [] Supervision   [] Medication in applesauce   [] Direct 1:1 Supervision [] Spoon all liquids   [x] Alternate solid / liquid [] Limit distractions   [] Monitor for fatigue  [] PMV in place for all po   [] OTHER:    PATIENT STRENGTHS:  [x] Motivated [x] Cooperative   [] Good family support    [x] Prior level of function  [] OTHER:    REHAB POTENTIAL:   [x] Excellent [] Good [] Fair  [] Poor    EDUCATION:   Learner: [x] Patient            [] Significant other                                       [] Son/Daughter [] Parent     [] Other:     Education: [x] Reviewed results and recommendations of this evaluation     [x] Reviewed diet and strategies     [] Reviewed signs, symptoms and risk of aspiration     [] Demonstrated how to thick liquid appropriately. [] Reviewed goals and Plan of Care     [] OTHER:     Method: [x] Discussion  [] Demonstration [] Hand-out     [] Other:     Evaluation of Education:     [x] Verbalizes understanding                                        [] Demonstrates with assistance     [] Demonstrates without assistance                                  []Needs further instruction     [] No evidence of learning                                        [] Family not present    PATIENT GOALS: [] Pt did not state; will further assess during treatment. [] Return to the least restricted diet possible     [x] Return to previous level of function     [] Other:    PLAN / TREATMENT RECOMMENDATIONS:  [x] No further speech therapy services indicated. [] Speech Therapy evaluation to assess speech, language, cognition and/or voice  [] Skilled dysphagia treatment ___ times per week for ___ weeks.   [] OTHER        Electronically signed by:     Herson Anguiano MS, CCC-SLP          Date: 1/23/2018

## 2018-01-24 ENCOUNTER — TELEPHONE (OUTPATIENT)
Dept: FAMILY MEDICINE CLINIC | Age: 68
End: 2018-01-24

## 2018-02-03 ENCOUNTER — APPOINTMENT (OUTPATIENT)
Dept: GENERAL RADIOLOGY | Age: 68
End: 2018-02-03
Payer: MEDICARE

## 2018-02-03 ENCOUNTER — HOSPITAL ENCOUNTER (EMERGENCY)
Age: 68
Discharge: HOME | End: 2018-02-03
Attending: SPECIALIST
Payer: MEDICARE

## 2018-02-03 VITALS
TEMPERATURE: 98.6 F | BODY MASS INDEX: 21.52 KG/M2 | HEIGHT: 61 IN | OXYGEN SATURATION: 95 % | SYSTOLIC BLOOD PRESSURE: 124 MMHG | WEIGHT: 114 LBS | HEART RATE: 95 BPM | RESPIRATION RATE: 20 BRPM | DIASTOLIC BLOOD PRESSURE: 65 MMHG

## 2018-02-03 DIAGNOSIS — Z93.1 GASTROSTOMY TUBE IN PLACE (HCC): Primary | ICD-10-CM

## 2018-02-03 PROCEDURE — 99283 EMERGENCY DEPT VISIT LOW MDM: CPT

## 2018-02-03 PROCEDURE — 74018 RADEX ABDOMEN 1 VIEW: CPT

## 2018-02-03 PROCEDURE — 6360000004 HC RX CONTRAST MEDICATION: Performed by: SPECIALIST

## 2018-02-03 RX ADMIN — DIATRIZOATE MEGLUMINE AND DIATRIZOATE SODIUM 60 ML: 660; 100 LIQUID ORAL; RECTAL at 10:32

## 2018-02-03 RX ADMIN — DIATRIZOATE MEGLUMINE AND DIATRIZOATE SODIUM 60 ML: 660; 100 LIQUID ORAL; RECTAL at 10:37

## 2018-02-03 NOTE — ED PROVIDER NOTES
leaking; 60 ml of gastro injected by nurse: lot: [id number], exp: jan2020 Acuity: Acute Type of Exam: Initial FINDINGS: Gastrostomy tube is noted in the region of the body of the stomach. There is a Gastrografin contrast opacification of the stomach. No evidence of a contrast extravasation. Duodenum is not visualized. Surgical clips in the right upper abdomen. Gastrostomy tube appears to be in a proper position in the stomach. No radiographic evidence for leak or extravasation. ED BEDSIDE ULTRASOUND:       LABS:  Labs Reviewed - No data to display      EMERGENCY DEPARTMENT COURSE:   Vitals:    Vitals:    02/03/18 0934   BP: 124/65   Pulse: 95   Resp: 20   Temp: 98.6 °F (37 °C)   TempSrc: Tympanic   SpO2: 95%   Weight: 114 lb (51.7 kg)   Height: 5' 1\" (1.549 m)     -------------------------  BP: 124/65, Temp: 98.6 °F (37 °C), Pulse: 95, Resp: 20    Orders Placed This Encounter   Medications    DISCONTD: diatrizoate meglumine-sodium (GASTROGRAFIN) 66-10 % solution 60 mL       During emergency department course, patient is resting comfortably and does not appear to be in any pain or distress. The Above the end of the gastrostomy tube was secured well with the tape and patient was discharged home with instructions to continue current medications and fluid intake, continue flushing the G-tube, follow up with her PCP/gastroenterologist, return if worse. I have reviewed the disposition diagnosis with the patient and or their family/guardian. I have answered their questions and given discharge instructions. They voiced understanding of these instructions and did not have any further questions or complaints. Re-evaluation Notes    Patient is resting comfortably and does not appear to be in any pain or distress. CRITICAL CARE:   None        CONSULTS:      PROCEDURES:  None    FINAL IMPRESSION      1.  Gastrostomy tube in place [facility])          DISPOSITION/PLAN   DISPOSITION Decision To

## 2018-02-03 NOTE — ED NOTES
Patient requested, and is given ice water, with permission of Dr. Phan Razo. Patient is given call light and is advised on use of TV. She states her friend can come back to the room. Friend is advised of this from registration window and states she is going to wait. Patient is advised and denies further needs.      Guzman Nieves RN  02/03/18 1923

## 2018-02-23 ENCOUNTER — OFFICE VISIT (OUTPATIENT)
Dept: PRIMARY CARE CLINIC | Age: 68
End: 2018-02-23
Payer: MEDICARE

## 2018-02-23 ENCOUNTER — HOSPITAL ENCOUNTER (OUTPATIENT)
Dept: GENERAL RADIOLOGY | Age: 68
Discharge: HOME | End: 2018-02-25
Payer: MEDICARE

## 2018-02-23 VITALS
HEART RATE: 123 BPM | BODY MASS INDEX: 22.05 KG/M2 | RESPIRATION RATE: 20 BRPM | TEMPERATURE: 98.4 F | DIASTOLIC BLOOD PRESSURE: 84 MMHG | WEIGHT: 116.8 LBS | SYSTOLIC BLOOD PRESSURE: 128 MMHG | OXYGEN SATURATION: 98 % | HEIGHT: 61 IN

## 2018-02-23 DIAGNOSIS — R07.81 RIB PAIN ON RIGHT SIDE: Primary | ICD-10-CM

## 2018-02-23 DIAGNOSIS — R07.81 RIB PAIN ON RIGHT SIDE: ICD-10-CM

## 2018-02-23 PROCEDURE — G8420 CALC BMI NORM PARAMETERS: HCPCS | Performed by: NURSE PRACTITIONER

## 2018-02-23 PROCEDURE — G8484 FLU IMMUNIZE NO ADMIN: HCPCS | Performed by: NURSE PRACTITIONER

## 2018-02-23 PROCEDURE — G8399 PT W/DXA RESULTS DOCUMENT: HCPCS | Performed by: NURSE PRACTITIONER

## 2018-02-23 PROCEDURE — 1123F ACP DISCUSS/DSCN MKR DOCD: CPT | Performed by: NURSE PRACTITIONER

## 2018-02-23 PROCEDURE — 4040F PNEUMOC VAC/ADMIN/RCVD: CPT | Performed by: NURSE PRACTITIONER

## 2018-02-23 PROCEDURE — 71101 X-RAY EXAM UNILAT RIBS/CHEST: CPT

## 2018-02-23 PROCEDURE — 3017F COLORECTAL CA SCREEN DOC REV: CPT | Performed by: NURSE PRACTITIONER

## 2018-02-23 PROCEDURE — G8427 DOCREV CUR MEDS BY ELIG CLIN: HCPCS | Performed by: NURSE PRACTITIONER

## 2018-02-23 PROCEDURE — 3014F SCREEN MAMMO DOC REV: CPT | Performed by: NURSE PRACTITIONER

## 2018-02-23 PROCEDURE — 1090F PRES/ABSN URINE INCON ASSESS: CPT | Performed by: NURSE PRACTITIONER

## 2018-02-23 PROCEDURE — 99213 OFFICE O/P EST LOW 20 MIN: CPT | Performed by: NURSE PRACTITIONER

## 2018-02-23 PROCEDURE — 1036F TOBACCO NON-USER: CPT | Performed by: NURSE PRACTITIONER

## 2018-02-23 ASSESSMENT — ENCOUNTER SYMPTOMS
CHEST TIGHTNESS: 0
RESPIRATORY NEGATIVE: 1
SHORTNESS OF BREATH: 0

## 2018-02-23 NOTE — PATIENT INSTRUCTIONS
Patient Education        Muscle Strain: Care Instructions  Your Care Instructions    A muscle strain happens when you overstretch, or pull, a muscle. It can happen when you exercise or lift something or when you have an accident. Rest and other home care can help the muscle heal.  Follow-up care is a key part of your treatment and safety. Be sure to make and go to all appointments, and call your doctor if you are having problems. It's also a good idea to know your test results and keep a list of the medicines you take. How can you care for yourself at home? · Rest the strained muscle. Do not put weight on it for a day or two. If your doctor advises you to, use crutches or a sling to rest a sore limb. · Put ice or a cold pack on the sore muscle for 10 to 20 minutes at a time to stop swelling. Put a thin cloth between the ice pack and your skin. · Prop up the sore arm or leg on a pillow when you ice it or anytime you sit or lie down during the next 3 days. Try to keep it above the level of your heart. This will help reduce swelling. · Take pain medicines exactly as directed. ¨ If the doctor gave you a prescription medicine for pain, take it as prescribed. ¨ If you are not taking a prescription pain medicine, ask your doctor if you can take an over-the-counter medicine. · Do not do anything that makes the pain worse. Return to exercise gradually as you feel better. When should you call for help? Call your doctor now or seek immediate medical care if:  ? · You have new severe pain. ? · Your injured limb is cool or pale or changes color. ? · You have tingling, weakness, or numbness in your injured limb. ? · You cannot move the injured area. ? Watch closely for changes in your health, and be sure to contact your doctor if:  ? · You cannot put weight on a joint, or it feels unsteady when you walk. ? · Pain and swelling get worse or do not start to get better after 2 days of home treatment.    Where can you learn more? Go to https://chpepiceweb.HeTexted. org and sign in to your [employer] account. Enter Y765 in the [employer] box to learn more about \"Muscle Strain: Care Instructions. \"     If you do not have an account, please click on the \"Sign Up Now\" link. Current as of: March 21, 2017  Content Version: 11.5  © 3988-3261 Healthwise, [employer]. Care instructions adapted under license by [facility] [address] If you have questions about a medical condition or this instruction, always ask your healthcare professional. [address] any warranty or liability for your use of this information.

## 2018-04-10 ENCOUNTER — OFFICE VISIT (OUTPATIENT)
Dept: FAMILY MEDICINE CLINIC | Age: 68
End: 2018-04-10
Payer: MEDICARE

## 2018-04-10 ENCOUNTER — HOSPITAL ENCOUNTER (OUTPATIENT)
Dept: GENERAL RADIOLOGY | Age: 68
Discharge: HOME | End: 2018-04-12
Payer: MEDICARE

## 2018-04-10 ENCOUNTER — HOSPITAL ENCOUNTER (OUTPATIENT)
Dept: SPEECH THERAPY | Age: 68
Setting detail: THERAPIES SERIES
Discharge: HOME | End: 2018-04-10
Payer: COMMERCIAL

## 2018-04-10 VITALS
BODY MASS INDEX: 21.9 KG/M2 | DIASTOLIC BLOOD PRESSURE: 70 MMHG | HEART RATE: 68 BPM | WEIGHT: 116 LBS | SYSTOLIC BLOOD PRESSURE: 128 MMHG | HEIGHT: 61 IN

## 2018-04-10 DIAGNOSIS — R13.10 DYSPHAGIA, UNSPECIFIED TYPE: ICD-10-CM

## 2018-04-10 DIAGNOSIS — Q27.39 ARTERIOVENOUS MALFORMATION, OTHER SITE: Primary | ICD-10-CM

## 2018-04-10 DIAGNOSIS — Z43.1 ATTENTION TO GASTROSTOMY (HCC): ICD-10-CM

## 2018-04-10 DIAGNOSIS — R13.10 DYSPHAGIA, UNSPECIFIED TYPE: Primary | ICD-10-CM

## 2018-04-10 DIAGNOSIS — Z93.0 TRACHEOSTOMY STATUS (HCC): ICD-10-CM

## 2018-04-10 PROCEDURE — G8997 SWALLOW GOAL STATUS: HCPCS | Performed by: SPEECH-LANGUAGE PATHOLOGIST

## 2018-04-10 PROCEDURE — G8420 CALC BMI NORM PARAMETERS: HCPCS | Performed by: FAMILY MEDICINE

## 2018-04-10 PROCEDURE — 3014F SCREEN MAMMO DOC REV: CPT | Performed by: FAMILY MEDICINE

## 2018-04-10 PROCEDURE — 92611 MOTION FLUOROSCOPY/SWALLOW: CPT | Performed by: SPEECH-LANGUAGE PATHOLOGIST

## 2018-04-10 PROCEDURE — 1036F TOBACCO NON-USER: CPT | Performed by: FAMILY MEDICINE

## 2018-04-10 PROCEDURE — 99214 OFFICE O/P EST MOD 30 MIN: CPT | Performed by: FAMILY MEDICINE

## 2018-04-10 PROCEDURE — 1123F ACP DISCUSS/DSCN MKR DOCD: CPT | Performed by: FAMILY MEDICINE

## 2018-04-10 PROCEDURE — G8996 SWALLOW CURRENT STATUS: HCPCS | Performed by: SPEECH-LANGUAGE PATHOLOGIST

## 2018-04-10 PROCEDURE — 1090F PRES/ABSN URINE INCON ASSESS: CPT | Performed by: FAMILY MEDICINE

## 2018-04-10 PROCEDURE — G8998 SWALLOW D/C STATUS: HCPCS | Performed by: SPEECH-LANGUAGE PATHOLOGIST

## 2018-04-10 PROCEDURE — G8427 DOCREV CUR MEDS BY ELIG CLIN: HCPCS | Performed by: FAMILY MEDICINE

## 2018-04-10 PROCEDURE — G8399 PT W/DXA RESULTS DOCUMENT: HCPCS | Performed by: FAMILY MEDICINE

## 2018-04-10 PROCEDURE — 74230 X-RAY XM SWLNG FUNCJ C+: CPT

## 2018-04-10 PROCEDURE — 4040F PNEUMOC VAC/ADMIN/RCVD: CPT | Performed by: FAMILY MEDICINE

## 2018-04-10 PROCEDURE — 3017F COLORECTAL CA SCREEN DOC REV: CPT | Performed by: FAMILY MEDICINE

## 2018-04-10 ASSESSMENT — ENCOUNTER SYMPTOMS
ALLERGIC/IMMUNOLOGIC NEGATIVE: 1
COUGH: 1
TROUBLE SWALLOWING: 0
RHINORRHEA: 0
VOICE CHANGE: 0
EYES NEGATIVE: 1
GASTROINTESTINAL NEGATIVE: 1

## 2018-04-26 ENCOUNTER — TELEPHONE (OUTPATIENT)
Dept: FAMILY MEDICINE CLINIC | Age: 68
End: 2018-04-26
Payer: MEDICARE

## 2018-04-26 DIAGNOSIS — G47.30 SLEEP APNEA, UNSPECIFIED TYPE: ICD-10-CM

## 2018-04-26 DIAGNOSIS — R47.89 OTHER SPEECH DISTURBANCE: ICD-10-CM

## 2018-04-26 DIAGNOSIS — Z93.0 TRACHEOSTOMY STATUS (HCC): ICD-10-CM

## 2018-04-26 DIAGNOSIS — Z43.9: ICD-10-CM

## 2018-04-26 DIAGNOSIS — Z43.0 ATTENTION TO TRACHEOSTOMY (HCC): ICD-10-CM

## 2018-04-26 DIAGNOSIS — Q27.39 ARTERIOVENOUS MALFORMATION, OTHER SITE: Primary | ICD-10-CM

## 2018-04-26 PROCEDURE — G0180 MD CERTIFICATION HHA PATIENT: HCPCS | Performed by: FAMILY MEDICINE

## 2018-04-26 RX ORDER — DOCUSATE SODIUM 100 MG/1
100 CAPSULE, LIQUID FILLED ORAL 2 TIMES DAILY PRN
COMMUNITY
End: 2018-04-30 | Stop reason: ALTCHOICE

## 2018-04-30 ENCOUNTER — OFFICE VISIT (OUTPATIENT)
Dept: PRIMARY CARE CLINIC | Age: 68
End: 2018-04-30
Payer: MEDICARE

## 2018-04-30 VITALS
HEIGHT: 61 IN | WEIGHT: 118 LBS | HEART RATE: 68 BPM | SYSTOLIC BLOOD PRESSURE: 112 MMHG | BODY MASS INDEX: 22.28 KG/M2 | DIASTOLIC BLOOD PRESSURE: 68 MMHG | TEMPERATURE: 98.2 F

## 2018-04-30 DIAGNOSIS — J95.03 TRACHEOSTOMY MALFUNCTION (HCC): Primary | ICD-10-CM

## 2018-04-30 DIAGNOSIS — J02.9 PHARYNGITIS, UNSPECIFIED ETIOLOGY: ICD-10-CM

## 2018-04-30 PROCEDURE — 1036F TOBACCO NON-USER: CPT | Performed by: FAMILY MEDICINE

## 2018-04-30 PROCEDURE — 3017F COLORECTAL CA SCREEN DOC REV: CPT | Performed by: FAMILY MEDICINE

## 2018-04-30 PROCEDURE — 4040F PNEUMOC VAC/ADMIN/RCVD: CPT | Performed by: FAMILY MEDICINE

## 2018-04-30 PROCEDURE — G8427 DOCREV CUR MEDS BY ELIG CLIN: HCPCS | Performed by: FAMILY MEDICINE

## 2018-04-30 PROCEDURE — G8420 CALC BMI NORM PARAMETERS: HCPCS | Performed by: FAMILY MEDICINE

## 2018-04-30 PROCEDURE — 1090F PRES/ABSN URINE INCON ASSESS: CPT | Performed by: FAMILY MEDICINE

## 2018-04-30 PROCEDURE — 1123F ACP DISCUSS/DSCN MKR DOCD: CPT | Performed by: FAMILY MEDICINE

## 2018-04-30 PROCEDURE — G8399 PT W/DXA RESULTS DOCUMENT: HCPCS | Performed by: FAMILY MEDICINE

## 2018-04-30 PROCEDURE — 99214 OFFICE O/P EST MOD 30 MIN: CPT | Performed by: FAMILY MEDICINE

## 2018-04-30 ASSESSMENT — ENCOUNTER SYMPTOMS
STRIDOR: 1
SHORTNESS OF BREATH: 0
TROUBLE SWALLOWING: 0
COUGH: 0
EYES NEGATIVE: 1
RHINORRHEA: 1
GASTROINTESTINAL NEGATIVE: 1
ALLERGIC/IMMUNOLOGIC NEGATIVE: 1

## 2018-06-22 ENCOUNTER — TELEPHONE (OUTPATIENT)
Dept: FAMILY MEDICINE CLINIC | Age: 68
End: 2018-06-22

## 2018-06-26 ENCOUNTER — TELEPHONE (OUTPATIENT)
Dept: FAMILY MEDICINE CLINIC | Age: 68
End: 2018-06-26
Payer: MEDICARE

## 2018-06-26 DIAGNOSIS — Z93.0 TRACHEOSTOMY STATUS (HCC): ICD-10-CM

## 2018-06-26 DIAGNOSIS — I05.9 RHEUMATIC DISEASE OF MITRAL VALVE: ICD-10-CM

## 2018-06-26 DIAGNOSIS — Z43.0 ATTENTION TO TRACHEOSTOMY (HCC): ICD-10-CM

## 2018-06-26 DIAGNOSIS — G47.30 INSOMNIA WITH SLEEP APNEA: ICD-10-CM

## 2018-06-26 DIAGNOSIS — Q27.39 ARTERIOVENOUS MALFORMATION, OTHER SITE: Primary | ICD-10-CM

## 2018-06-26 DIAGNOSIS — G47.00 INSOMNIA WITH SLEEP APNEA: ICD-10-CM

## 2018-06-26 PROCEDURE — G0180 MD CERTIFICATION HHA PATIENT: HCPCS | Performed by: FAMILY MEDICINE

## 2018-06-27 ENCOUNTER — OFFICE VISIT (OUTPATIENT)
Dept: FAMILY MEDICINE CLINIC | Age: 68
End: 2018-06-27
Payer: MEDICARE

## 2018-06-27 VITALS
OXYGEN SATURATION: 95 % | TEMPERATURE: 98.6 F | WEIGHT: 117 LBS | DIASTOLIC BLOOD PRESSURE: 60 MMHG | HEIGHT: 61 IN | SYSTOLIC BLOOD PRESSURE: 120 MMHG | HEART RATE: 92 BPM | BODY MASS INDEX: 22.09 KG/M2

## 2018-06-27 DIAGNOSIS — Z43.0 ATTENTION TO TRACHEOSTOMY (HCC): ICD-10-CM

## 2018-06-27 DIAGNOSIS — Q27.39 ARTERIOVENOUS MALFORMATION, OTHER SITE: Primary | ICD-10-CM

## 2018-06-27 DIAGNOSIS — Z43.1 ATTENTION TO GASTROSTOMY (HCC): ICD-10-CM

## 2018-06-27 PROBLEM — Q27.9 VENOUS MALFORMATION: Status: ACTIVE | Noted: 2017-11-14

## 2018-06-27 PROCEDURE — 99214 OFFICE O/P EST MOD 30 MIN: CPT | Performed by: FAMILY MEDICINE

## 2018-06-27 PROCEDURE — 3017F COLORECTAL CA SCREEN DOC REV: CPT | Performed by: FAMILY MEDICINE

## 2018-06-27 PROCEDURE — 1090F PRES/ABSN URINE INCON ASSESS: CPT | Performed by: FAMILY MEDICINE

## 2018-06-27 PROCEDURE — 4040F PNEUMOC VAC/ADMIN/RCVD: CPT | Performed by: FAMILY MEDICINE

## 2018-06-27 PROCEDURE — G8420 CALC BMI NORM PARAMETERS: HCPCS | Performed by: FAMILY MEDICINE

## 2018-06-27 PROCEDURE — 1036F TOBACCO NON-USER: CPT | Performed by: FAMILY MEDICINE

## 2018-06-27 PROCEDURE — G8399 PT W/DXA RESULTS DOCUMENT: HCPCS | Performed by: FAMILY MEDICINE

## 2018-06-27 PROCEDURE — 1123F ACP DISCUSS/DSCN MKR DOCD: CPT | Performed by: FAMILY MEDICINE

## 2018-06-27 PROCEDURE — G8427 DOCREV CUR MEDS BY ELIG CLIN: HCPCS | Performed by: FAMILY MEDICINE

## 2018-06-27 ASSESSMENT — ENCOUNTER SYMPTOMS
GASTROINTESTINAL NEGATIVE: 1
COUGH: 1
TROUBLE SWALLOWING: 0
ALLERGIC/IMMUNOLOGIC NEGATIVE: 1
VOICE CHANGE: 0
RHINORRHEA: 0
EYES NEGATIVE: 1

## 2018-08-24 ENCOUNTER — OFFICE VISIT (OUTPATIENT)
Dept: PRIMARY CARE CLINIC | Age: 68
End: 2018-08-24
Payer: MEDICARE

## 2018-08-24 VITALS
TEMPERATURE: 98.5 F | HEIGHT: 61 IN | SYSTOLIC BLOOD PRESSURE: 110 MMHG | BODY MASS INDEX: 22.01 KG/M2 | WEIGHT: 116.6 LBS | RESPIRATION RATE: 20 BRPM | OXYGEN SATURATION: 96 % | DIASTOLIC BLOOD PRESSURE: 62 MMHG | HEART RATE: 74 BPM

## 2018-08-24 DIAGNOSIS — T14.8XXA BLOOD BLISTER: Primary | ICD-10-CM

## 2018-08-24 PROCEDURE — 4040F PNEUMOC VAC/ADMIN/RCVD: CPT | Performed by: NURSE PRACTITIONER

## 2018-08-24 PROCEDURE — 99213 OFFICE O/P EST LOW 20 MIN: CPT | Performed by: NURSE PRACTITIONER

## 2018-08-24 PROCEDURE — G8420 CALC BMI NORM PARAMETERS: HCPCS | Performed by: NURSE PRACTITIONER

## 2018-08-24 PROCEDURE — G8427 DOCREV CUR MEDS BY ELIG CLIN: HCPCS | Performed by: NURSE PRACTITIONER

## 2018-08-24 PROCEDURE — G8399 PT W/DXA RESULTS DOCUMENT: HCPCS | Performed by: NURSE PRACTITIONER

## 2018-08-24 PROCEDURE — 1036F TOBACCO NON-USER: CPT | Performed by: NURSE PRACTITIONER

## 2018-08-24 PROCEDURE — 1123F ACP DISCUSS/DSCN MKR DOCD: CPT | Performed by: NURSE PRACTITIONER

## 2018-08-24 PROCEDURE — 1101F PT FALLS ASSESS-DOCD LE1/YR: CPT | Performed by: NURSE PRACTITIONER

## 2018-08-24 PROCEDURE — 3017F COLORECTAL CA SCREEN DOC REV: CPT | Performed by: NURSE PRACTITIONER

## 2018-08-24 PROCEDURE — 1090F PRES/ABSN URINE INCON ASSESS: CPT | Performed by: NURSE PRACTITIONER

## 2018-08-24 ASSESSMENT — ENCOUNTER SYMPTOMS
COUGH: 0
CHEST TIGHTNESS: 0
ABDOMINAL PAIN: 0
CONSTIPATION: 0
TROUBLE SWALLOWING: 0
EYES NEGATIVE: 1
SINUS PRESSURE: 0
NAUSEA: 0
DIARRHEA: 0
ALLERGIC/IMMUNOLOGIC NEGATIVE: 1
VOMITING: 0
COLOR CHANGE: 1
SHORTNESS OF BREATH: 0

## 2018-08-24 ASSESSMENT — PATIENT HEALTH QUESTIONNAIRE - PHQ9
SUM OF ALL RESPONSES TO PHQ9 QUESTIONS 1 & 2: 0
SUM OF ALL RESPONSES TO PHQ QUESTIONS 1-9: 0
SUM OF ALL RESPONSES TO PHQ QUESTIONS 1-9: 0
2. FEELING DOWN, DEPRESSED OR HOPELESS: 0
1. LITTLE INTEREST OR PLEASURE IN DOING THINGS: 0

## 2018-08-24 NOTE — PROGRESS NOTES
[address] [facility] [place], [place]  [address], [address] [place]  [phone]      Quiana Vines is a 77 y.o. female who is c/o of Toe Pain (right foot, middle toe, looks like blood blister-painful to touch)      HPI:     HPI Patient in today for an acute visit for symptoms of right toe pain. She states that she noticed it this week. She states that it is not draining. Subjective:      Review of Systems   Constitutional: Negative for activity change, appetite change and fever. HENT: Negative for congestion, ear pain, sinus pressure and trouble swallowing. Eyes: Negative. Respiratory: Negative for cough, chest tightness and shortness of breath. Cardiovascular: Negative for chest pain and palpitations. Gastrointestinal: Negative for abdominal pain, constipation, diarrhea, nausea and vomiting. Endocrine: Negative. Genitourinary: Negative for difficulty urinating and dysuria. Musculoskeletal: Negative. Skin: Positive for color change (blood blister on third right toe, mild tenderness). Allergic/Immunologic: Negative. Neurological: Negative for dizziness, light-headedness and headaches. Hematological: Negative. Psychiatric/Behavioral: Negative. Objective:     Vitals:    08/24/18 1053   BP: 110/62   Site: Right Arm   Position: Sitting   Cuff Size: Medium Adult   Pulse: 74   Resp: 20   Temp: 98.5 °F (36.9 °C)   TempSrc: Tympanic   SpO2: 96%   Weight: 116 lb 9.6 oz (52.9 kg)   Height: 5' 1\" (1.549 m)     Physical Exam   Constitutional: She is oriented to person, place, and time. She appears well-developed and well-nourished. HENT:   Head: Normocephalic and atraumatic. Right Ear: External ear normal.   Left Ear: External ear normal.   Nose: Nose normal.   Eyes: Pupils are equal, round, and reactive to light. Conjunctivae and EOM are normal.   Neck: Normal range of motion. Cardiovascular: Normal rate, regular rhythm and normal heart sounds. Pulmonary/Chest: Effort normal and breath sounds normal.   She has a trach     Abdominal: Soft. Musculoskeletal: Normal range of motion. Left foot: There is tenderness (mild blood blister). Feet:    Neurological: She is alert and oriented to person, place, and time. Skin: Skin is warm and dry. Psychiatric: She has a normal mood and affect. Her behavior is normal. Judgment and thought content normal.       Assessment:       Diagnosis Orders   1. Blood blister         Plan:      Advised to monitor. May use ICE a few times a day. If it pops we are clean with rubbing etoh 2-3 times per day. Discussed use, benefit, and side effects of prescribed medications. All patient questions answered. Pt voiced understanding. Follow up PRN.       Electronically signed by  MARIS White CNP on 8/24/2018 at 11:09 AM

## 2018-10-01 ENCOUNTER — OFFICE VISIT (OUTPATIENT)
Dept: FAMILY MEDICINE CLINIC | Age: 68
End: 2018-10-01
Payer: MEDICARE

## 2018-10-01 VITALS
SYSTOLIC BLOOD PRESSURE: 102 MMHG | BODY MASS INDEX: 21.9 KG/M2 | HEART RATE: 68 BPM | DIASTOLIC BLOOD PRESSURE: 62 MMHG | HEIGHT: 61 IN | WEIGHT: 116 LBS

## 2018-10-01 DIAGNOSIS — Z43.1 ATTENTION TO GASTROSTOMY (HCC): ICD-10-CM

## 2018-10-01 DIAGNOSIS — Q27.39 ARTERIOVENOUS MALFORMATION, OTHER SITE: Primary | ICD-10-CM

## 2018-10-01 DIAGNOSIS — Z43.0 ATTENTION TO TRACHEOSTOMY (HCC): ICD-10-CM

## 2018-10-01 PROCEDURE — 3017F COLORECTAL CA SCREEN DOC REV: CPT | Performed by: FAMILY MEDICINE

## 2018-10-01 PROCEDURE — G8484 FLU IMMUNIZE NO ADMIN: HCPCS | Performed by: FAMILY MEDICINE

## 2018-10-01 PROCEDURE — 1090F PRES/ABSN URINE INCON ASSESS: CPT | Performed by: FAMILY MEDICINE

## 2018-10-01 PROCEDURE — G8427 DOCREV CUR MEDS BY ELIG CLIN: HCPCS | Performed by: FAMILY MEDICINE

## 2018-10-01 PROCEDURE — 1036F TOBACCO NON-USER: CPT | Performed by: FAMILY MEDICINE

## 2018-10-01 PROCEDURE — G8399 PT W/DXA RESULTS DOCUMENT: HCPCS | Performed by: FAMILY MEDICINE

## 2018-10-01 PROCEDURE — 4040F PNEUMOC VAC/ADMIN/RCVD: CPT | Performed by: FAMILY MEDICINE

## 2018-10-01 PROCEDURE — G8420 CALC BMI NORM PARAMETERS: HCPCS | Performed by: FAMILY MEDICINE

## 2018-10-01 PROCEDURE — 99214 OFFICE O/P EST MOD 30 MIN: CPT | Performed by: FAMILY MEDICINE

## 2018-10-01 PROCEDURE — 1123F ACP DISCUSS/DSCN MKR DOCD: CPT | Performed by: FAMILY MEDICINE

## 2018-10-01 PROCEDURE — 1101F PT FALLS ASSESS-DOCD LE1/YR: CPT | Performed by: FAMILY MEDICINE

## 2018-10-01 ASSESSMENT — ENCOUNTER SYMPTOMS
TROUBLE SWALLOWING: 0
ALLERGIC/IMMUNOLOGIC NEGATIVE: 1
EYES NEGATIVE: 1
RHINORRHEA: 0
VOICE CHANGE: 0
COUGH: 1
GASTROINTESTINAL NEGATIVE: 1

## 2018-10-01 ASSESSMENT — PATIENT HEALTH QUESTIONNAIRE - PHQ9
2. FEELING DOWN, DEPRESSED OR HOPELESS: 0
SUM OF ALL RESPONSES TO PHQ QUESTIONS 1-9: 0
SUM OF ALL RESPONSES TO PHQ QUESTIONS 1-9: 0
1. LITTLE INTEREST OR PLEASURE IN DOING THINGS: 0
SUM OF ALL RESPONSES TO PHQ9 QUESTIONS 1 & 2: 0

## 2018-10-01 NOTE — PROGRESS NOTES
procedures and slowly making progress on the size of these lesions . She is pleased with the results thus far with no significant complications of concern. She has labs scheduled for follow up on other concerns, 10/20/18. Will review as available.

## 2018-10-17 ENCOUNTER — OFFICE VISIT (OUTPATIENT)
Dept: PRIMARY CARE CLINIC | Age: 68
End: 2018-10-17
Payer: MEDICARE

## 2018-10-17 VITALS
HEART RATE: 88 BPM | TEMPERATURE: 97 F | WEIGHT: 122 LBS | DIASTOLIC BLOOD PRESSURE: 78 MMHG | OXYGEN SATURATION: 96 % | SYSTOLIC BLOOD PRESSURE: 118 MMHG | BODY MASS INDEX: 23.03 KG/M2 | HEIGHT: 61 IN

## 2018-10-17 DIAGNOSIS — R22.41 MASS OF LESSER TOE OF RIGHT FOOT: Primary | ICD-10-CM

## 2018-10-17 PROCEDURE — 1036F TOBACCO NON-USER: CPT | Performed by: NURSE PRACTITIONER

## 2018-10-17 PROCEDURE — 1090F PRES/ABSN URINE INCON ASSESS: CPT | Performed by: NURSE PRACTITIONER

## 2018-10-17 PROCEDURE — 99213 OFFICE O/P EST LOW 20 MIN: CPT | Performed by: NURSE PRACTITIONER

## 2018-10-17 PROCEDURE — 1101F PT FALLS ASSESS-DOCD LE1/YR: CPT | Performed by: NURSE PRACTITIONER

## 2018-10-17 PROCEDURE — 4040F PNEUMOC VAC/ADMIN/RCVD: CPT | Performed by: NURSE PRACTITIONER

## 2018-10-17 PROCEDURE — G8420 CALC BMI NORM PARAMETERS: HCPCS | Performed by: NURSE PRACTITIONER

## 2018-10-17 PROCEDURE — 3017F COLORECTAL CA SCREEN DOC REV: CPT | Performed by: NURSE PRACTITIONER

## 2018-10-17 PROCEDURE — G8427 DOCREV CUR MEDS BY ELIG CLIN: HCPCS | Performed by: NURSE PRACTITIONER

## 2018-10-17 PROCEDURE — G8484 FLU IMMUNIZE NO ADMIN: HCPCS | Performed by: NURSE PRACTITIONER

## 2018-10-17 PROCEDURE — G8399 PT W/DXA RESULTS DOCUMENT: HCPCS | Performed by: NURSE PRACTITIONER

## 2018-10-17 PROCEDURE — 1123F ACP DISCUSS/DSCN MKR DOCD: CPT | Performed by: NURSE PRACTITIONER

## 2018-10-17 ASSESSMENT — ENCOUNTER SYMPTOMS: RESPIRATORY NEGATIVE: 1

## 2018-10-20 ENCOUNTER — HOSPITAL ENCOUNTER (OUTPATIENT)
Dept: LAB | Age: 68
Discharge: HOME | End: 2018-10-20
Payer: MEDICARE

## 2018-10-20 DIAGNOSIS — I10 ESSENTIAL HYPERTENSION: ICD-10-CM

## 2018-10-20 DIAGNOSIS — E78.00 PURE HYPERCHOLESTEROLEMIA: ICD-10-CM

## 2018-10-20 LAB
ABSOLUTE EOS #: 0.2 K/UL (ref 0–0.4)
ABSOLUTE IMMATURE GRANULOCYTE: ABNORMAL K/UL (ref 0–0.3)
ABSOLUTE LYMPH #: 1.6 K/UL (ref 1–4.8)
ABSOLUTE MONO #: 0.7 K/UL (ref 0.1–1.2)
ANION GAP SERPL CALCULATED.3IONS-SCNC: 11 MMOL/L (ref 9–17)
BASOPHILS # BLD: 1 % (ref 0–1)
BASOPHILS ABSOLUTE: 0.1 K/UL (ref 0–0.2)
BUN BLDV-MCNC: 16 MG/DL (ref 8–23)
BUN/CREAT BLD: 21 (ref 9–20)
CALCIUM SERPL-MCNC: 10.3 MG/DL (ref 8.6–10.4)
CHLORIDE BLD-SCNC: 103 MMOL/L (ref 98–107)
CHOLESTEROL/HDL RATIO: 4.4
CHOLESTEROL: 266 MG/DL
CO2: 28 MMOL/L (ref 20–31)
CREAT SERPL-MCNC: 0.76 MG/DL (ref 0.5–0.9)
DIFFERENTIAL TYPE: ABNORMAL
EOSINOPHILS RELATIVE PERCENT: 3 % (ref 1–7)
GFR AFRICAN AMERICAN: >60 ML/MIN
GFR NON-AFRICAN AMERICAN: >60 ML/MIN
GFR SERPL CREATININE-BSD FRML MDRD: ABNORMAL ML/MIN/{1.73_M2}
GFR SERPL CREATININE-BSD FRML MDRD: ABNORMAL ML/MIN/{1.73_M2}
GLUCOSE BLD-MCNC: 115 MG/DL (ref 70–99)
HCT VFR BLD CALC: 42.4 % (ref 36–46)
HDLC SERPL-MCNC: 60 MG/DL
HEMOGLOBIN: 13.8 G/DL (ref 12–16)
IMMATURE GRANULOCYTES: ABNORMAL %
LDL CHOLESTEROL: 182 MG/DL (ref 0–130)
LYMPHOCYTES # BLD: 27 % (ref 16–46)
MCH RBC QN AUTO: 30.2 PG (ref 26–34)
MCHC RBC AUTO-ENTMCNC: 32.5 G/DL (ref 31–37)
MCV RBC AUTO: 93 FL (ref 80–100)
MONOCYTES # BLD: 12 % (ref 4–11)
NRBC AUTOMATED: ABNORMAL PER 100 WBC
PDW BLD-RTO: 14.2 % (ref 11–14.5)
PLATELET # BLD: 316 K/UL (ref 140–450)
PLATELET ESTIMATE: ABNORMAL
PMV BLD AUTO: 7.4 FL (ref 6–12)
POTASSIUM SERPL-SCNC: 4.3 MMOL/L (ref 3.7–5.3)
RBC # BLD: 4.56 M/UL (ref 4–5.2)
RBC # BLD: ABNORMAL 10*6/UL
SEG NEUTROPHILS: 57 % (ref 43–77)
SEGMENTED NEUTROPHILS ABSOLUTE COUNT: 3.4 K/UL (ref 1.8–7.7)
SODIUM BLD-SCNC: 142 MMOL/L (ref 135–144)
TRIGL SERPL-MCNC: 119 MG/DL
VLDLC SERPL CALC-MCNC: ABNORMAL MG/DL (ref 1–30)
WBC # BLD: 5.9 K/UL (ref 3.5–11)
WBC # BLD: ABNORMAL 10*3/UL

## 2018-10-20 PROCEDURE — 85025 COMPLETE CBC W/AUTO DIFF WBC: CPT

## 2018-10-20 PROCEDURE — 80048 BASIC METABOLIC PNL TOTAL CA: CPT

## 2018-10-20 PROCEDURE — 36415 COLL VENOUS BLD VENIPUNCTURE: CPT

## 2018-10-20 PROCEDURE — 80061 LIPID PANEL: CPT

## 2018-12-05 ENCOUNTER — TELEPHONE (OUTPATIENT)
Dept: FAMILY MEDICINE CLINIC | Age: 68
End: 2018-12-05

## 2018-12-07 ENCOUNTER — APPOINTMENT (OUTPATIENT)
Dept: GENERAL RADIOLOGY | Age: 68
End: 2018-12-07
Payer: MEDICARE

## 2018-12-07 ENCOUNTER — TELEPHONE (OUTPATIENT)
Dept: FAMILY MEDICINE CLINIC | Age: 68
End: 2018-12-07

## 2018-12-07 ENCOUNTER — HOSPITAL ENCOUNTER (EMERGENCY)
Age: 68
Discharge: HOME | End: 2018-12-07
Attending: EMERGENCY MEDICINE
Payer: MEDICARE

## 2018-12-07 VITALS
SYSTOLIC BLOOD PRESSURE: 122 MMHG | HEART RATE: 89 BPM | TEMPERATURE: 98.2 F | WEIGHT: 117 LBS | DIASTOLIC BLOOD PRESSURE: 58 MMHG | OXYGEN SATURATION: 97 % | BODY MASS INDEX: 22.11 KG/M2 | RESPIRATION RATE: 14 BRPM

## 2018-12-07 DIAGNOSIS — J40 BRONCHITIS: Primary | ICD-10-CM

## 2018-12-07 LAB
ABSOLUTE EOS #: 0.1 K/UL (ref 0–0.4)
ABSOLUTE IMMATURE GRANULOCYTE: NORMAL K/UL (ref 0–0.3)
ABSOLUTE LYMPH #: 1.2 K/UL (ref 1–4.8)
ABSOLUTE MONO #: 0.7 K/UL (ref 0.1–1.2)
ANION GAP SERPL CALCULATED.3IONS-SCNC: 12 MMOL/L (ref 9–17)
BASOPHILS # BLD: 1 % (ref 0–1)
BASOPHILS ABSOLUTE: 0 K/UL (ref 0–0.2)
BUN BLDV-MCNC: 21 MG/DL (ref 8–23)
BUN/CREAT BLD: 34 (ref 9–20)
CALCIUM SERPL-MCNC: 9.3 MG/DL (ref 8.6–10.4)
CHLORIDE BLD-SCNC: 102 MMOL/L (ref 98–107)
CO2: 29 MMOL/L (ref 20–31)
CREAT SERPL-MCNC: 0.62 MG/DL (ref 0.5–0.9)
DIFFERENTIAL TYPE: NORMAL
EOSINOPHILS RELATIVE PERCENT: 1 % (ref 1–7)
GFR AFRICAN AMERICAN: >60 ML/MIN
GFR NON-AFRICAN AMERICAN: >60 ML/MIN
GFR SERPL CREATININE-BSD FRML MDRD: ABNORMAL ML/MIN/{1.73_M2}
GFR SERPL CREATININE-BSD FRML MDRD: ABNORMAL ML/MIN/{1.73_M2}
GLUCOSE BLD-MCNC: 99 MG/DL (ref 70–99)
HCT VFR BLD CALC: 40 % (ref 36–46)
HEMOGLOBIN: 13.3 G/DL (ref 12–16)
IMMATURE GRANULOCYTES: NORMAL %
INR BLD: 1
LYMPHOCYTES # BLD: 20 % (ref 16–46)
MCH RBC QN AUTO: 30.7 PG (ref 26–34)
MCHC RBC AUTO-ENTMCNC: 33.1 G/DL (ref 31–37)
MCV RBC AUTO: 92.7 FL (ref 80–100)
MONOCYTES # BLD: 11 % (ref 4–11)
NRBC AUTOMATED: NORMAL PER 100 WBC
PDW BLD-RTO: 13.7 % (ref 11–14.5)
PLATELET # BLD: 236 K/UL (ref 140–450)
PLATELET ESTIMATE: NORMAL
PMV BLD AUTO: 8.1 FL (ref 6–12)
POTASSIUM SERPL-SCNC: 4.7 MMOL/L (ref 3.7–5.3)
PROTHROMBIN TIME: 10.6 SEC (ref 9.4–11.3)
RBC # BLD: 4.32 M/UL (ref 4–5.2)
RBC # BLD: NORMAL 10*6/UL
SEG NEUTROPHILS: 67 % (ref 43–77)
SEGMENTED NEUTROPHILS ABSOLUTE COUNT: 4.1 K/UL (ref 1.8–7.7)
SODIUM BLD-SCNC: 143 MMOL/L (ref 135–144)
WBC # BLD: 6.1 K/UL (ref 3.5–11)
WBC # BLD: NORMAL 10*3/UL

## 2018-12-07 PROCEDURE — 87070 CULTURE OTHR SPECIMN AEROBIC: CPT

## 2018-12-07 PROCEDURE — 36415 COLL VENOUS BLD VENIPUNCTURE: CPT

## 2018-12-07 PROCEDURE — 87205 SMEAR GRAM STAIN: CPT

## 2018-12-07 PROCEDURE — 87077 CULTURE AEROBIC IDENTIFY: CPT

## 2018-12-07 PROCEDURE — 89220 SPUTUM SPECIMEN COLLECTION: CPT

## 2018-12-07 PROCEDURE — 85025 COMPLETE CBC W/AUTO DIFF WBC: CPT

## 2018-12-07 PROCEDURE — 71046 X-RAY EXAM CHEST 2 VIEWS: CPT

## 2018-12-07 PROCEDURE — 85610 PROTHROMBIN TIME: CPT

## 2018-12-07 PROCEDURE — 80048 BASIC METABOLIC PNL TOTAL CA: CPT

## 2018-12-07 PROCEDURE — 99283 EMERGENCY DEPT VISIT LOW MDM: CPT

## 2018-12-07 PROCEDURE — 87186 SC STD MICRODIL/AGAR DIL: CPT

## 2018-12-07 RX ORDER — AZITHROMYCIN 250 MG/1
TABLET, FILM COATED ORAL
Qty: 1 PACKET | Refills: 0 | Status: SHIPPED | OUTPATIENT
Start: 2018-12-07 | End: 2019-03-06 | Stop reason: ALTCHOICE

## 2018-12-07 NOTE — ED PROVIDER NOTES
[facility] ED  [address]  Phone: [phone]    Pt Name: Aleyda Alcocer  MRN: [id number]  [place] 1950  Date of evaluation: 12/7/2018      CHIEF COMPLAINT       Chief Complaint   Patient presents with    Cough     blood tinged sputum in trach         HISTORY OF PRESENT ILLNESS     Aleyda Alcocer is a 77 y.o. 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 She is on blood thinners at this time. There is normal vital signs except blood pressures 58 . Call her surgeon at [address] [place] [address],[address] and was told to come here for further care. She has often been no wheezing or shortness of breath. No chest pain palpitations. No HEENT abnormalities other than the venous malformation of the tongue and lips. There is no airway compromise at this time. REVIEW OF SYSTEMS         REVIEW OF SYSTEMS    Constitutional:  Denies fever, chills, or weakness   Eyes:  Denies vision changes  HEENT:  Denies sore throat or nasal congestion  Respiratory:  As above  Cardiovascular:  Denies chest pain  GI:  Denies abdominal pain, nausea, vomiting, or diarrhea   Musculoskeletal:  Denies back pain   Skin:  No rash on exposed surfaces   Neurologic:  Normal baseline mentation. No new deficits. Lymphatic:   No nodes or infection  Psychiatric:  No psychosis. Alert and interacting normally. Other ROS negative except as noted above. PAST MEDICAL HISTORY    has a past medical history of Allergic rhinitis; GERD (gastroesophageal reflux disease); Hemangioma of face; Impaired fasting blood sugar; Internal hemorrhoids; Irritable bowel syndrome; Menometrorrhagia; Mitral valve prolapse; Obstructive sleep apnea;  Osteoarthritis; Sigmoid diverticulosis; and [facility] SURGICAL HISTORY      has a past surgical history that includes tia and bso (cervix removed) (2000); Cholecystectomy (2000); Mouth surgery; Tonsillectomy; Breast lumpectomy (Right, 1987); Upper gastrointestinal endoscopy (2014); Colonoscopy (2009); Colonoscopy (2014); Cataract removal with implant (Left, 2016); Cataract removal with implant (2016); tracheostomy (2017); Gastrostomy tube placement (2018); and other surgical history. CURRENT MEDICATIONS       Previous Medications    No medications on file       ALLERGIES     is allergic to penicillins; sulfa antibiotics; and e-mycin [erythromycin]. FAMILY HISTORY     indicated that her mother is . She indicated that her father is . She indicated that her maternal grandmother is . She indicated that her maternal grandfather is . She indicated that her paternal grandmother is . She indicated that her paternal grandfather is . She indicated that the status of her other is unknown.      family history includes Diabetes in an other family member; Glaucoma in her mother; Heart Disease in her mother; Pacemaker in her mother. SOCIAL HISTORY      reports that she has never smoked. She has never used smokeless tobacco. She reports that she does not drink alcohol or use drugs. PHYSICAL EXAM     INITIAL VITALS:  weight is 117 lb (53.1 kg). Her tympanic temperature is 98.2 °F (36.8 °C). Her blood pressure is 122/58 (abnormal) and her pulse is 89. Her respiration is 14 and oxygen saturation is 97%. Constitutional: The patient is alert and well-developed, vital signs as noted. Psychiatric: Oriented to time, place and person, affect is appropriate for age. Eyes: Pupils equal and reactive to light. EOMI. Ears, nose, and throat: Oropharynx clear  Neck: No masses, trachea midline, and no thyromegaly.  Tracheostomy in place which appears to be functioning well  Respiratory: Clear to auscultation, full aeration throughout all fields. Cardiovascular: No murmurs, heart sounds normal, no thrills. Gastrointestinal: No masses, no hepatosplenomegaly, bowel sounds positive. No tenderness. Skin: No rashes or lesions on exposed surfaces, good skin turgor. Nontoxic and well-hydrated in no hemodynamic compromise is noted  Extremities: Good range of motion, no edema. DIFFERENTIAL DIAGNOSIS/ MEDICAL DECISION MAKING:     Sputum Culture is ordered    Z-Jere as directed    Clear lungs at discharge    No evidence of pneumonia    No Evidence of hemodynamic Denies    Follow Exit Care instructions closely. I have reviewed the disposition diagnosis with the patient and or their family/guardian. I have answered their questions and given discharge instructions. They voiced understanding of these instructions and did not have any further questions or complaints. DIAGNOSTIC RESULTS     RADIOLOGY:   Non-plain film images such as CT, Ultrasound and MRI are read by the radiologist. Plain radiographic images are visualized and preliminarily interpreted by the emergency physician with the below findings:  XR CHEST STANDARD (2 VW)   Final Result   Stable chest without acute process and chronic findings as described.              LABS:  Results for orders placed or performed during the hospital encounter of 89/45/33   Basic Metabolic Panel   Result Value Ref Range    Glucose 99 70 - 99 mg/dL    BUN 21 8 - 23 mg/dL    CREATININE 0.62 0.50 - 0.90 mg/dL    Bun/Cre Ratio 34 (H) 9 - 20    Calcium 9.3 8.6 - 10.4 mg/dL    Sodium 143 135 - 144 mmol/L    Potassium 4.7 3.7 - 5.3 mmol/L    Chloride 102 98 - 107 mmol/L    CO2 29 20 - 31 mmol/L    Anion Gap 12 9 - 17 mmol/L    GFR Non-African American >60 >60 mL/min    GFR African American >60 >60 mL/min    GFR Comment          GFR Staging NOT REPORTED    CBC Auto Differential   Result Value Ref Range    WBC 6.1 3.5 - 11.0 k/uL    RBC 4.32 4.0 - 18271  [phone]    In 3 days        DISCHARGE MEDICATIONS:  New Prescriptions    AZITHROMYCIN (ZITHROMAX) 250 MG TABLET    Take 2 tablets (500 mg) on Day 1, followed by 1 tablet (250 mg) once daily on Days 2 through 5.        (Please note that portions of this note were completed with a voice recognition program.  Efforts were made to edit the dictations but occasionally words are mis-transcribed.)    Westley Mortensen,, DO  Attending Emergency Physician       [address] [place] [address],   12/07/18 [phone]

## 2018-12-09 LAB
CULTURE: ABNORMAL
DIRECT EXAM: ABNORMAL
Lab: ABNORMAL
ORGANISM: ABNORMAL
ORGANISM: ABNORMAL
SPECIMEN DESCRIPTION: ABNORMAL
STATUS: ABNORMAL

## 2018-12-10 ENCOUNTER — OFFICE VISIT (OUTPATIENT)
Dept: FAMILY MEDICINE CLINIC | Age: 68
End: 2018-12-10
Payer: MEDICARE

## 2018-12-10 VITALS
SYSTOLIC BLOOD PRESSURE: 126 MMHG | BODY MASS INDEX: 21.52 KG/M2 | TEMPERATURE: 97.2 F | HEIGHT: 61 IN | HEART RATE: 92 BPM | WEIGHT: 114 LBS | DIASTOLIC BLOOD PRESSURE: 72 MMHG

## 2018-12-10 DIAGNOSIS — Z43.0 ATTENTION TO TRACHEOSTOMY (HCC): ICD-10-CM

## 2018-12-10 DIAGNOSIS — Z43.1 ATTENTION TO GASTROSTOMY (HCC): ICD-10-CM

## 2018-12-10 DIAGNOSIS — Q27.39 ARTERIOVENOUS MALFORMATION, OTHER SITE: Primary | ICD-10-CM

## 2018-12-10 PROCEDURE — 1036F TOBACCO NON-USER: CPT | Performed by: FAMILY MEDICINE

## 2018-12-10 PROCEDURE — G8420 CALC BMI NORM PARAMETERS: HCPCS | Performed by: FAMILY MEDICINE

## 2018-12-10 PROCEDURE — 1090F PRES/ABSN URINE INCON ASSESS: CPT | Performed by: FAMILY MEDICINE

## 2018-12-10 PROCEDURE — G8482 FLU IMMUNIZE ORDER/ADMIN: HCPCS | Performed by: FAMILY MEDICINE

## 2018-12-10 PROCEDURE — G8427 DOCREV CUR MEDS BY ELIG CLIN: HCPCS | Performed by: FAMILY MEDICINE

## 2018-12-10 PROCEDURE — 4040F PNEUMOC VAC/ADMIN/RCVD: CPT | Performed by: FAMILY MEDICINE

## 2018-12-10 PROCEDURE — 3017F COLORECTAL CA SCREEN DOC REV: CPT | Performed by: FAMILY MEDICINE

## 2018-12-10 PROCEDURE — 99214 OFFICE O/P EST MOD 30 MIN: CPT | Performed by: FAMILY MEDICINE

## 2018-12-10 PROCEDURE — G8399 PT W/DXA RESULTS DOCUMENT: HCPCS | Performed by: FAMILY MEDICINE

## 2018-12-10 PROCEDURE — 1123F ACP DISCUSS/DSCN MKR DOCD: CPT | Performed by: FAMILY MEDICINE

## 2018-12-10 PROCEDURE — 1101F PT FALLS ASSESS-DOCD LE1/YR: CPT | Performed by: FAMILY MEDICINE

## 2018-12-10 PROCEDURE — 1111F DSCHRG MED/CURRENT MED MERGE: CPT | Performed by: FAMILY MEDICINE

## 2018-12-10 ASSESSMENT — PATIENT HEALTH QUESTIONNAIRE - PHQ9
SUM OF ALL RESPONSES TO PHQ QUESTIONS 1-9: 0
2. FEELING DOWN, DEPRESSED OR HOPELESS: 0
1. LITTLE INTEREST OR PLEASURE IN DOING THINGS: 0
SUM OF ALL RESPONSES TO PHQ QUESTIONS 1-9: 0
SUM OF ALL RESPONSES TO PHQ9 QUESTIONS 1 & 2: 0

## 2018-12-10 ASSESSMENT — ENCOUNTER SYMPTOMS
ALLERGIC/IMMUNOLOGIC NEGATIVE: 1
GASTROINTESTINAL NEGATIVE: 1
RHINORRHEA: 0
TROUBLE SWALLOWING: 0
VOICE CHANGE: 0
EYES NEGATIVE: 1
COUGH: 1

## 2018-12-10 NOTE — PROGRESS NOTES
Subjective:      Patient ID: Geraldine Pond is a 77 y.o. female. Other   Associated symptoms include coughing (tracheostomy tube makes her cough). Acute follow up after her 5th  sclerotherapy treatment 12/3/18for AVM's of tongue and mouth/lip. 4th treatment 9/14/18 targeting tongue , chin , and small area anterior throat, and lower lip. 3rd treatment 06/18/18 in [facility] was directed more towards the right tongue and lip. This last treatment targeting tongue and lower lip. . She tolerated the procedure well. Planning follow up in 8-12 weeks to discuss additional treatments. Tongue improvement in size and color the most noticeable for her. She has had some cough with some colored secretions after coming home from the hospital.  Also some light blood from trach. Site secretions. She was to the ED 12/7/18 with these symptoms and treated for bronchitis. zpak started and she feels the secretions and cough are improved at present. No fever. No sob. Cough improved, minimal at present. She continues to use the PEG tube for nutrition after these procedures. She was cleared to resume po intake when she feels she is ready and able. No plans for follow up swallow study by patient report. Historically she has done well with oral intake after the procedures after the initial inflammation and swelling resolves. Past Medical History:   Diagnosis Date    Allergic rhinitis     GERD (gastroesophageal reflux disease)     Hemangioma of face     And arm.  Impaired fasting blood sugar     Internal hemorrhoids     Irritable bowel syndrome     Menometrorrhagia     Severe.  Mitral valve prolapse     Obstructive sleep apnea     non compliant with cpap    Osteoarthritis     Sigmoid diverticulosis     Supraventricular tachycardia [facility])      Past Surgical History:   Procedure Laterality Date    BREAST LUMPECTOMY Right 11/18/1987    Benign, right upper quadrant.     CATARACT REMOVAL WITH IMPLANT persistent cough.           Plan:          She has completed the 5 scheduled treatments. At present the plan is to wait for healing over the next 8-10 weeks and then re assess and consider additional options. Plastic surgery consult discussed to work on the lip. Trach tube doing well. Some recent increased yellow phlegm and mild cough, improving with antibiotic at present. Will call if progressing, fever, sob, or other concerns. This seems to follow her procedures the last couple of times. She relates increased \"honking\" and obstruction/debris obstructing blanca speaker valve and it is over 4 months old at present. She needs a new valve at present. We will order through [facility] where she gets her supplies.      g-tube: changed to a smaller tube/connection. No interval bleeding to report. She uses the feeding tube there first week after the procedure when the swelling and discomfort is more prominent. She has been cleared to resume po intake as tolerated after the first week. Historically this transition has not led to complications or problems with aspiration. Mary Bonner seems to be doing well with the procedures and slowly making progress on the size of these lesions . She is pleased with the results thus far with no significant complications of concern.

## 2019-03-06 ENCOUNTER — HOSPITAL ENCOUNTER (EMERGENCY)
Age: 69
Discharge: HOME | End: 2019-03-06
Attending: EMERGENCY MEDICINE
Payer: MEDICARE

## 2019-03-06 VITALS
DIASTOLIC BLOOD PRESSURE: 57 MMHG | WEIGHT: 119 LBS | HEIGHT: 60 IN | OXYGEN SATURATION: 98 % | RESPIRATION RATE: 16 BRPM | TEMPERATURE: 98.1 F | SYSTOLIC BLOOD PRESSURE: 126 MMHG | BODY MASS INDEX: 23.36 KG/M2 | HEART RATE: 72 BPM

## 2019-03-06 DIAGNOSIS — Z51.89 VISIT FOR WOUND CHECK: Primary | ICD-10-CM

## 2019-03-06 PROCEDURE — 99283 EMERGENCY DEPT VISIT LOW MDM: CPT

## 2019-03-06 ASSESSMENT — ENCOUNTER SYMPTOMS
DIARRHEA: 0
EYE PAIN: 0
NAUSEA: 0
COUGH: 0
CONSTIPATION: 0
SHORTNESS OF BREATH: 0
ABDOMINAL PAIN: 0
VOMITING: 0
BACK PAIN: 0
BLOOD IN STOOL: 0

## 2019-03-13 ENCOUNTER — HOSPITAL ENCOUNTER (EMERGENCY)
Age: 69
Discharge: HOME | End: 2019-03-13
Attending: EMERGENCY MEDICINE
Payer: MEDICARE

## 2019-03-13 VITALS
TEMPERATURE: 97 F | BODY MASS INDEX: 22.85 KG/M2 | WEIGHT: 117 LBS | SYSTOLIC BLOOD PRESSURE: 119 MMHG | DIASTOLIC BLOOD PRESSURE: 57 MMHG | HEART RATE: 78 BPM | OXYGEN SATURATION: 97 % | RESPIRATION RATE: 16 BRPM

## 2019-03-13 DIAGNOSIS — Z51.89 VISIT FOR WOUND CHECK: Primary | ICD-10-CM

## 2019-03-13 PROCEDURE — 6370000000 HC RX 637 (ALT 250 FOR IP): Performed by: EMERGENCY MEDICINE

## 2019-03-13 PROCEDURE — 99282 EMERGENCY DEPT VISIT SF MDM: CPT

## 2019-03-13 RX ORDER — NYSTATIN 100000 U/G
CREAM TOPICAL 2 TIMES DAILY
Status: DISCONTINUED | OUTPATIENT
Start: 2019-03-13 | End: 2019-03-13 | Stop reason: HOSPADM

## 2019-03-13 RX ADMIN — NYSTATIN: 100000 CREAM TOPICAL at 14:06

## 2019-03-13 ASSESSMENT — PAIN SCALES - GENERAL: PAINLEVEL_OUTOF10: 7

## 2019-03-13 ASSESSMENT — PAIN DESCRIPTION - LOCATION: LOCATION: ABDOMEN

## 2019-03-25 ENCOUNTER — TELEPHONE (OUTPATIENT)
Dept: OTOLARYNGOLOGY | Age: 69
End: 2019-03-25

## 2019-04-10 ENCOUNTER — TELEPHONE (OUTPATIENT)
Dept: FAMILY MEDICINE CLINIC | Age: 69
End: 2019-04-10

## 2019-04-10 ENCOUNTER — HOSPITAL ENCOUNTER (OUTPATIENT)
Age: 69
Setting detail: SPECIMEN
Discharge: HOME | End: 2019-04-10
Payer: MEDICARE

## 2019-04-10 ENCOUNTER — OFFICE VISIT (OUTPATIENT)
Dept: PRIMARY CARE CLINIC | Age: 69
End: 2019-04-10
Payer: MEDICARE

## 2019-04-10 VITALS
OXYGEN SATURATION: 98 % | HEART RATE: 74 BPM | BODY MASS INDEX: 23.98 KG/M2 | WEIGHT: 122.8 LBS | DIASTOLIC BLOOD PRESSURE: 78 MMHG | TEMPERATURE: 98.2 F | SYSTOLIC BLOOD PRESSURE: 124 MMHG

## 2019-04-10 DIAGNOSIS — T81.49XA WOUND INFECTION AFTER SURGERY: Primary | ICD-10-CM

## 2019-04-10 DIAGNOSIS — T81.49XA WOUND INFECTION AFTER SURGERY: ICD-10-CM

## 2019-04-10 PROCEDURE — G8420 CALC BMI NORM PARAMETERS: HCPCS | Performed by: FAMILY MEDICINE

## 2019-04-10 PROCEDURE — G8427 DOCREV CUR MEDS BY ELIG CLIN: HCPCS | Performed by: FAMILY MEDICINE

## 2019-04-10 PROCEDURE — 1036F TOBACCO NON-USER: CPT | Performed by: FAMILY MEDICINE

## 2019-04-10 PROCEDURE — 87205 SMEAR GRAM STAIN: CPT

## 2019-04-10 PROCEDURE — 1090F PRES/ABSN URINE INCON ASSESS: CPT | Performed by: FAMILY MEDICINE

## 2019-04-10 PROCEDURE — 86403 PARTICLE AGGLUT ANTBDY SCRN: CPT

## 2019-04-10 PROCEDURE — 99214 OFFICE O/P EST MOD 30 MIN: CPT | Performed by: FAMILY MEDICINE

## 2019-04-10 PROCEDURE — G8399 PT W/DXA RESULTS DOCUMENT: HCPCS | Performed by: FAMILY MEDICINE

## 2019-04-10 PROCEDURE — 3017F COLORECTAL CA SCREEN DOC REV: CPT | Performed by: FAMILY MEDICINE

## 2019-04-10 PROCEDURE — 87070 CULTURE OTHR SPECIMN AEROBIC: CPT

## 2019-04-10 PROCEDURE — 4040F PNEUMOC VAC/ADMIN/RCVD: CPT | Performed by: FAMILY MEDICINE

## 2019-04-10 PROCEDURE — 1123F ACP DISCUSS/DSCN MKR DOCD: CPT | Performed by: FAMILY MEDICINE

## 2019-04-10 RX ORDER — AZITHROMYCIN 250 MG/1
250 TABLET, FILM COATED ORAL SEE ADMIN INSTRUCTIONS
Qty: 6 TABLET | Refills: 0 | Status: SHIPPED | OUTPATIENT
Start: 2019-04-10 | End: 2019-04-15

## 2019-04-10 ASSESSMENT — PATIENT HEALTH QUESTIONNAIRE - PHQ9
SUM OF ALL RESPONSES TO PHQ QUESTIONS 1-9: 0
2. FEELING DOWN, DEPRESSED OR HOPELESS: 0
SUM OF ALL RESPONSES TO PHQ QUESTIONS 1-9: 0
SUM OF ALL RESPONSES TO PHQ9 QUESTIONS 1 & 2: 0
1. LITTLE INTEREST OR PLEASURE IN DOING THINGS: 0

## 2019-04-10 NOTE — PROGRESS NOTES
erythema corresponding to where the tape was adhered. The tracheostomy site has a very small amount of clear to yellow drainage. Chest is clear to auscultation, no wheezes, rales, or rhonchi. Heart sounds are regular rate and rhythm, no murmurs. The g-tube site on the abdomen has erythema with some yellow drainage. A swab was obtained of the g-tube site. Assessment and Plan:    Wound infection after surgery  - Wound Culture; Future  - azithromycin (ZITHROMAX) 250 MG tablet; Take 1 tablet by mouth See Admin Instructions for 5 days 500mg on day 1 followed by 250mg on days 2 - 5  Dispense: 6 tablet; Refill: 0    She will be contacted when the ID and sensitivity results are available.          (Please note that portions of this note were completed with a voice-recognition program. Efforts were made to edit the dictation but occasionally words are mis-transcribed.)

## 2019-04-10 NOTE — PATIENT INSTRUCTIONS
We will contact you when the results of your culture are available. This usually takes at least 48 hours.

## 2019-04-12 LAB
CULTURE: ABNORMAL
DIRECT EXAM: ABNORMAL
Lab: ABNORMAL
SPECIMEN DESCRIPTION: ABNORMAL

## 2019-04-19 ENCOUNTER — OFFICE VISIT (OUTPATIENT)
Dept: PRIMARY CARE CLINIC | Age: 69
End: 2019-04-19
Payer: MEDICARE

## 2019-04-19 VITALS
SYSTOLIC BLOOD PRESSURE: 110 MMHG | DIASTOLIC BLOOD PRESSURE: 80 MMHG | TEMPERATURE: 98.6 F | OXYGEN SATURATION: 94 % | HEART RATE: 72 BPM | BODY MASS INDEX: 23.83 KG/M2 | WEIGHT: 122 LBS

## 2019-04-19 DIAGNOSIS — S30.811D EXCORIATION OF ABDOMEN, SUBSEQUENT ENCOUNTER: ICD-10-CM

## 2019-04-19 DIAGNOSIS — K94.29 IRRITATION AROUND PERCUTANEOUS ENDOSCOPIC GASTROSTOMY (PEG) TUBE SITE (HCC): Primary | ICD-10-CM

## 2019-04-19 PROCEDURE — 99214 OFFICE O/P EST MOD 30 MIN: CPT | Performed by: NURSE PRACTITIONER

## 2019-04-19 PROCEDURE — 3017F COLORECTAL CA SCREEN DOC REV: CPT | Performed by: NURSE PRACTITIONER

## 2019-04-19 PROCEDURE — 1123F ACP DISCUSS/DSCN MKR DOCD: CPT | Performed by: NURSE PRACTITIONER

## 2019-04-19 PROCEDURE — 4040F PNEUMOC VAC/ADMIN/RCVD: CPT | Performed by: NURSE PRACTITIONER

## 2019-04-19 PROCEDURE — 1090F PRES/ABSN URINE INCON ASSESS: CPT | Performed by: NURSE PRACTITIONER

## 2019-04-19 PROCEDURE — G8399 PT W/DXA RESULTS DOCUMENT: HCPCS | Performed by: NURSE PRACTITIONER

## 2019-04-19 PROCEDURE — G8427 DOCREV CUR MEDS BY ELIG CLIN: HCPCS | Performed by: NURSE PRACTITIONER

## 2019-04-19 PROCEDURE — 1036F TOBACCO NON-USER: CPT | Performed by: NURSE PRACTITIONER

## 2019-04-19 PROCEDURE — G8420 CALC BMI NORM PARAMETERS: HCPCS | Performed by: NURSE PRACTITIONER

## 2019-04-19 NOTE — PROGRESS NOTES
[address]  [facility]gent [facility]  [address] [facility], [address]   Phone: [phone]  Fax: [phone]    Date: 4/19/2019   Patient:  Willie Nicole   YOB: 1950 Age: 68 y.o. MRN: [id number]   PCP: Heraclio Gray MD       Subjective:    Chief Complaint   Patient presents with    Wound Infection     G-tube infection despite Zpak tx last week. drainage continues. currently no fevers or pain. HPI: Patient presents with complaints of g-tube site drainage for the past 6 weeks. She had her g-tube removed on 03/06/2019. She was seen on 04/10/2019 and placed on a 5 day course of azithromycin for possible g-tube site cellulitis, and feels initially the odor and darkness of the drainage improved, but is still concerned about the drainage and redness surrounding the wound site. A wound culture was obtained at that time and shows moderate growth of staphylococcus species, coagulase negative. She has been leaving the area open to air at times and has been placing a dry dressing on it at other times. She had been putting vaseline on the area prior, but stopped about a week ago. She has not been using any nystatin cream or Desitin at home as a barrier because she felt these topical treatments stung the area too bad to tolerate. She denies any site pain or fevers/chills. History is obtained from the patient and previous medical records. All other review of systems negative. Allergies   Allergen Reactions    Ciprofloxacin      Does not set well with taste d/t g-tube placement    Penicillins      Childhood.  Sulfa Antibiotics     E-Mycin [Erythromycin] Nausea And Vomiting       No current outpatient medications on file. No current facility-administered medications for this visit. Past Medical History:   Diagnosis Date    Allergic rhinitis     GERD (gastroesophageal reflux disease)     Hemangioma of face     And arm.     Impaired fasting blood sugar     Internal rhythm, normal S1 and S2, no murmurs, rubs, clicks, or gallops, distal pulses intact  Abdomen: soft, non-tender, non-distended, normal bowel sounds, no masses or organomegaly  Extremities: no cyanosis, clubbing, or edema  Musculoskeletal: normal range of motion, no joint swelling, deformity or tenderness  Neurologic: no gross cranial nerve deficit, gait, coordination normal, and speech at baseline      Assessment and Plan:  Visit Diagnoses       Codes    Irritation around percutaneous endoscopic gastrostomy (PEG) tube site [facility])    -  Primary K94.29    Excoriation of abdomen, subsequent encounter     S30.811D        Advised patient to use thin layer of Desitin ointment on the area 1-2 times daily, or calmoseptine ointment if desitin is not tolerated--to place around the peg tube site hole, but not within it, to protect the skin from the bile. Some bile drainage is expected until the wound is closed. She has a follow-up already scheduled with her surgeon next week. Return or go to an urgent care or emergency room if symptoms worsen, fail to improve, or new symptoms arise. The use, risks, benefits, and side effects of prescribed or recommended medications were discussed. All questions were answered and the patient/caregiver voiced understanding.        Electronically signed by MARLENI Núñez, TRISTEN-BC on 4/19/2019 at [address] [facility]  Internal Medicine

## 2019-05-13 ENCOUNTER — OFFICE VISIT (OUTPATIENT)
Dept: PRIMARY CARE CLINIC | Age: 69
End: 2019-05-13
Payer: MEDICARE

## 2019-05-13 VITALS
SYSTOLIC BLOOD PRESSURE: 124 MMHG | WEIGHT: 122 LBS | DIASTOLIC BLOOD PRESSURE: 72 MMHG | TEMPERATURE: 96.7 F | OXYGEN SATURATION: 98 % | HEART RATE: 71 BPM | BODY MASS INDEX: 23.83 KG/M2

## 2019-05-13 DIAGNOSIS — K94.29 IRRITATION AROUND PERCUTANEOUS ENDOSCOPIC GASTROSTOMY (PEG) TUBE SITE (HCC): Primary | ICD-10-CM

## 2019-05-13 PROCEDURE — G8399 PT W/DXA RESULTS DOCUMENT: HCPCS | Performed by: NURSE PRACTITIONER

## 2019-05-13 PROCEDURE — 4040F PNEUMOC VAC/ADMIN/RCVD: CPT | Performed by: NURSE PRACTITIONER

## 2019-05-13 PROCEDURE — 99213 OFFICE O/P EST LOW 20 MIN: CPT | Performed by: NURSE PRACTITIONER

## 2019-05-13 PROCEDURE — 1123F ACP DISCUSS/DSCN MKR DOCD: CPT | Performed by: NURSE PRACTITIONER

## 2019-05-13 PROCEDURE — G8427 DOCREV CUR MEDS BY ELIG CLIN: HCPCS | Performed by: NURSE PRACTITIONER

## 2019-05-13 PROCEDURE — 1036F TOBACCO NON-USER: CPT | Performed by: NURSE PRACTITIONER

## 2019-05-13 PROCEDURE — G8420 CALC BMI NORM PARAMETERS: HCPCS | Performed by: NURSE PRACTITIONER

## 2019-05-13 PROCEDURE — 1090F PRES/ABSN URINE INCON ASSESS: CPT | Performed by: NURSE PRACTITIONER

## 2019-05-13 PROCEDURE — 3017F COLORECTAL CA SCREEN DOC REV: CPT | Performed by: NURSE PRACTITIONER

## 2019-05-13 ASSESSMENT — ENCOUNTER SYMPTOMS
VOMITING: 0
ABDOMINAL PAIN: 0
NAUSEA: 0
RESPIRATORY NEGATIVE: 1

## 2019-05-13 NOTE — PROGRESS NOTES
[facility]             [address], [address]                        Telephone [phone]             Fax [phone]     Joya Alanis  1950  LNE:[id number]   Date of visit:  5/13/2019    Subjective:     Joya Alanis is a 68 y.o.  female who presents to [facility] today (5/13/2019) for evaluation of:    Chief Complaint   Patient presents with    G Tube Complications     possibly infected? - drainage; scabbed over and keeps breaking open; redness around the area; additionally wants trach opening looked at for infection; Other   Pertinent negatives include no abdominal pain, chest pain, fever, nausea or vomiting. Associated symptoms comments: 3/29/19 tracheostomy was removed, no redness, pain, or drainage from area. Changes dressing daily. Patient verbalized concern of healing. 3/6/19 G-tube was removed, treated for cellulitis of the site on 04/10/2019. Occasional light blood drainage. Changes dressing daily. Patient verbalized concern of healing. Nothing aggravates the symptoms. She has the following problem list:  Patient Active Problem List   Diagnosis    Mitral valve prolapse    Supraventricular tachycardia ([address]    Hypertension    Hyperlipidemia    Allergic rhinitis    Impaired fasting blood sugar    Sigmoid diverticulosis    Obstructive sleep apnea    Cataract, post subcapsular polar senile    Hemangioma of face    Venous malformation        Current medications are:  No current outpatient medications on file. No current facility-administered medications for this visit. She is allergic to ciprofloxacin; penicillins; sulfa antibiotics; and e-mycin [erythromycin]. .    She  reports that she has never smoked.  She has never used smokeless tobacco.      Objective:    Vitals:    05/13/19 1056   BP: 124/72   Pulse: 71   Temp: 96.7 °F (35.9 °C)   SpO2: 98% Body mass index is 23.83 kg/m². Review of Systems   Constitutional: Negative. Negative for fever. Respiratory: Negative. Cardiovascular: Negative. Negative for chest pain. Gastrointestinal: Negative for abdominal pain, nausea and vomiting. Skin: Positive for wound. Physical Exam   Constitutional: She is oriented to person, place, and time. She appears well-developed and well-nourished. HENT:   Head: Normocephalic. Right Ear: Hearing, tympanic membrane, external ear and ear canal normal.   Left Ear: Hearing, tympanic membrane, external ear and ear canal normal.   Nose: Nose normal.   Mouth/Throat: Uvula is midline, oropharynx is clear and moist and mucous membranes are normal.   Eyes: Pupils are equal, round, and reactive to light. Neck: Normal range of motion. Neck supple. Cardiovascular: Normal rate, regular rhythm and normal heart sounds. Pulmonary/Chest: Effort normal and breath sounds normal.   Neurological: She is alert and oriented to person, place, and time. Skin: Skin is warm and dry. Left side abdomen with 4 mm opening of G-tube insertion with pea size serosanguinous drainage noted on old dressing. Light erythema surrounding area approximately a quarter size without increased warmth or tenderness. Site of tracheostomy without erythema, swelling, increased warmth, tenderness, or drainage. No drainage noted on old dressing. Psychiatric: She has a normal mood and affect. Her behavior is normal. Thought content normal.   Nursing note and vitals reviewed. Assessment and Plan:     Diagnosis Orders   1. Irritation around percutaneous endoscopic gastrostomy (PEG) tube site [facility])       Continue to change dressings daily. Apply Desitin as needed to area around G-tube insertion site, not in the insertions site. We discussed signs of infection. I reassured patient the both sites are healing appropriately and the process could take months. Patient verbalized understanding. Follow up with PCP or surgeon if symptoms worsen.      Electronically signed by MARIS Johnson CNP on 5/13/19 at 11:09 AM

## 2019-05-13 NOTE — PATIENT INSTRUCTIONS
Patient Education        Wound Check: Care Instructions  Your Care Instructions  People have wounds that need care for many reasons. You may have a cut that needs care after surgery. You may have a cut or puncture wound from an accident. Or you may have a wound because of a condition like diabetes. Whatever the cause of your wound, there are things you can do to care for it at home. Your doctor may also want you to come back for a wound check. The wound check lets the doctor know how your wound is healing and if you need more treatment. Follow-up care is a key part of your treatment and safety. Be sure to make and go to all appointments, and call your doctor if you are having problems. It's also a good idea to know your test results and keep a list of the medicines you take. How can you care for yourself at home? · If your doctor told you how to care for your wound, follow your doctor's instructions. If you did not get instructions, follow this general advice:  ? You may cover the wound with a thin layer of petroleum jelly, such as Vaseline, and a nonstick bandage. ? Apply more petroleum jelly and replace the bandage as needed. · Keep the wound dry for the first 24 to 48 hours. After this, you can shower if your doctor okays it. Pat the wound dry. · Be safe with medicines. Read and follow all instructions on the label. ? If the doctor gave you a prescription medicine for pain, take it as prescribed. ? If you are not taking a prescription pain medicine, ask your doctor if you can take an over-the-counter medicine. · If your doctor prescribed antibiotics, take them as directed. Do not stop taking them just because you feel better. You need to take the full course of antibiotics. · If you have stitches, do not remove them on your own. Your doctor will tell you when to come back to have them removed. · If you have Steri-Strips, leave them on until they fall off.   · If possible, prop up the injured area on a pillow anytime you sit or lie down during the next 3 days. Try to keep it above the level of your heart. This will help reduce swelling. When should you call for help? Call your doctor now or seek immediate medical care if:    · You have new pain, or the pain gets worse.     · The skin near the wound is cold or pale or changes color.     · You have tingling, weakness, or numbness near the wound.     · The wound starts to bleed, and blood soaks through the bandage. Oozing small amounts of blood is normal.     · You have symptoms of infection, such as:  ? Increased pain, swelling, warmth, or redness. ? Red streaks leading from the wound. ? Pus draining from the wound. ? A fever.    Watch closely for changes in your health, and be sure to contact your doctor if:    · You do not get better as expected. Where can you learn more? Go to https://[employer]. org and sign in to your [employer] account. Enter  in the [employer] box to learn more about \"Wound Check: Care Instructions. \"     If you do not have an account, please click on the \"Sign Up Now\" link. Current as of: September 23, 2018  Content Version: 12.0  © 1518-4290 [employer]. Care instructions adapted under license by [facility] ([facility]). If you have questions about a medical condition or this instruction, always ask your healthcare professional. [place] [address] any warranty or liability for your use of this information.

## 2019-07-07 ENCOUNTER — OFFICE VISIT (OUTPATIENT)
Dept: PRIMARY CARE CLINIC | Age: 69
End: 2019-07-07
Payer: MEDICARE

## 2019-07-07 VITALS
SYSTOLIC BLOOD PRESSURE: 130 MMHG | HEIGHT: 60 IN | DIASTOLIC BLOOD PRESSURE: 76 MMHG | BODY MASS INDEX: 23.91 KG/M2 | OXYGEN SATURATION: 93 % | HEART RATE: 72 BPM | WEIGHT: 121.8 LBS | TEMPERATURE: 98.3 F

## 2019-07-07 DIAGNOSIS — J06.9 UPPER RESPIRATORY TRACT INFECTION, UNSPECIFIED TYPE: Primary | ICD-10-CM

## 2019-07-07 PROCEDURE — 99213 OFFICE O/P EST LOW 20 MIN: CPT | Performed by: FAMILY MEDICINE

## 2019-07-07 PROCEDURE — G8420 CALC BMI NORM PARAMETERS: HCPCS | Performed by: FAMILY MEDICINE

## 2019-07-07 PROCEDURE — G8399 PT W/DXA RESULTS DOCUMENT: HCPCS | Performed by: FAMILY MEDICINE

## 2019-07-07 PROCEDURE — G8427 DOCREV CUR MEDS BY ELIG CLIN: HCPCS | Performed by: FAMILY MEDICINE

## 2019-07-07 PROCEDURE — 1090F PRES/ABSN URINE INCON ASSESS: CPT | Performed by: FAMILY MEDICINE

## 2019-07-07 PROCEDURE — 1123F ACP DISCUSS/DSCN MKR DOCD: CPT | Performed by: FAMILY MEDICINE

## 2019-07-07 PROCEDURE — 4040F PNEUMOC VAC/ADMIN/RCVD: CPT | Performed by: FAMILY MEDICINE

## 2019-07-07 PROCEDURE — 3017F COLORECTAL CA SCREEN DOC REV: CPT | Performed by: FAMILY MEDICINE

## 2019-07-07 PROCEDURE — 1036F TOBACCO NON-USER: CPT | Performed by: FAMILY MEDICINE

## 2019-07-07 RX ORDER — FLUTICASONE PROPIONATE 50 MCG
1 SPRAY, SUSPENSION (ML) NASAL DAILY
Qty: 1 BOTTLE | Refills: 0 | Status: SHIPPED | OUTPATIENT
Start: 2019-07-07 | End: 2019-12-17 | Stop reason: ALTCHOICE

## 2019-07-08 ENCOUNTER — TELEPHONE (OUTPATIENT)
Dept: FAMILY MEDICINE CLINIC | Age: 69
End: 2019-07-08

## 2019-07-08 DIAGNOSIS — J06.9 UPPER RESPIRATORY TRACT INFECTION, UNSPECIFIED TYPE: ICD-10-CM

## 2019-07-09 RX ORDER — AZITHROMYCIN 250 MG/1
250 TABLET, FILM COATED ORAL SEE ADMIN INSTRUCTIONS
Qty: 6 TABLET | Refills: 0 | Status: SHIPPED | OUTPATIENT
Start: 2019-07-09 | End: 2019-07-14

## 2019-09-16 ENCOUNTER — IMMUNIZATION (OUTPATIENT)
Dept: LAB | Age: 69
End: 2019-09-16
Payer: MEDICARE

## 2019-09-16 DIAGNOSIS — Z23 NEED FOR VACCINATION: Primary | ICD-10-CM

## 2019-09-16 PROCEDURE — G0008 ADMIN INFLUENZA VIRUS VAC: HCPCS | Performed by: FAMILY MEDICINE

## 2019-09-16 PROCEDURE — 90653 IIV ADJUVANT VACCINE IM: CPT | Performed by: FAMILY MEDICINE

## 2019-11-08 ENCOUNTER — TELEPHONE (OUTPATIENT)
Dept: FAMILY MEDICINE CLINIC | Age: 69
End: 2019-11-08

## 2019-12-17 ENCOUNTER — OFFICE VISIT (OUTPATIENT)
Dept: FAMILY MEDICINE CLINIC | Age: 69
End: 2019-12-17
Payer: MEDICARE

## 2019-12-17 VITALS
WEIGHT: 122 LBS | DIASTOLIC BLOOD PRESSURE: 76 MMHG | HEART RATE: 72 BPM | HEIGHT: 60 IN | SYSTOLIC BLOOD PRESSURE: 130 MMHG | BODY MASS INDEX: 23.95 KG/M2

## 2019-12-17 DIAGNOSIS — E78.00 PURE HYPERCHOLESTEROLEMIA: ICD-10-CM

## 2019-12-17 DIAGNOSIS — K57.30 SIGMOID DIVERTICULOSIS: ICD-10-CM

## 2019-12-17 DIAGNOSIS — G47.33 OBSTRUCTIVE SLEEP APNEA: ICD-10-CM

## 2019-12-17 DIAGNOSIS — J30.1 SEASONAL ALLERGIC RHINITIS DUE TO POLLEN: ICD-10-CM

## 2019-12-17 DIAGNOSIS — Z12.11 COLON CANCER SCREENING: ICD-10-CM

## 2019-12-17 DIAGNOSIS — I10 ESSENTIAL HYPERTENSION: Primary | ICD-10-CM

## 2019-12-17 DIAGNOSIS — Z93.0 TRACHEOSTOMY STATUS (HCC): ICD-10-CM

## 2019-12-17 DIAGNOSIS — Q27.39 ARTERIOVENOUS MALFORMATION, OTHER SITE: ICD-10-CM

## 2019-12-17 PROCEDURE — 1090F PRES/ABSN URINE INCON ASSESS: CPT | Performed by: FAMILY MEDICINE

## 2019-12-17 PROCEDURE — 1036F TOBACCO NON-USER: CPT | Performed by: FAMILY MEDICINE

## 2019-12-17 PROCEDURE — 3017F COLORECTAL CA SCREEN DOC REV: CPT | Performed by: FAMILY MEDICINE

## 2019-12-17 PROCEDURE — G8482 FLU IMMUNIZE ORDER/ADMIN: HCPCS | Performed by: FAMILY MEDICINE

## 2019-12-17 PROCEDURE — G8399 PT W/DXA RESULTS DOCUMENT: HCPCS | Performed by: FAMILY MEDICINE

## 2019-12-17 PROCEDURE — 99214 OFFICE O/P EST MOD 30 MIN: CPT | Performed by: FAMILY MEDICINE

## 2019-12-17 PROCEDURE — G8420 CALC BMI NORM PARAMETERS: HCPCS | Performed by: FAMILY MEDICINE

## 2019-12-17 PROCEDURE — G8427 DOCREV CUR MEDS BY ELIG CLIN: HCPCS | Performed by: FAMILY MEDICINE

## 2019-12-17 PROCEDURE — 1123F ACP DISCUSS/DSCN MKR DOCD: CPT | Performed by: FAMILY MEDICINE

## 2019-12-17 PROCEDURE — 4040F PNEUMOC VAC/ADMIN/RCVD: CPT | Performed by: FAMILY MEDICINE

## 2019-12-17 ASSESSMENT — PATIENT HEALTH QUESTIONNAIRE - PHQ9
SUM OF ALL RESPONSES TO PHQ9 QUESTIONS 1 & 2: 0
2. FEELING DOWN, DEPRESSED OR HOPELESS: 0
SUM OF ALL RESPONSES TO PHQ QUESTIONS 1-9: 0
SUM OF ALL RESPONSES TO PHQ QUESTIONS 1-9: 0
1. LITTLE INTEREST OR PLEASURE IN DOING THINGS: 0

## 2019-12-17 ASSESSMENT — ENCOUNTER SYMPTOMS
EYES NEGATIVE: 1
RESPIRATORY NEGATIVE: 1
CONSTIPATION: 1
ALLERGIC/IMMUNOLOGIC NEGATIVE: 1

## 2019-12-23 ENCOUNTER — OFFICE VISIT (OUTPATIENT)
Dept: PRIMARY CARE CLINIC | Age: 69
End: 2019-12-23
Payer: MEDICARE

## 2019-12-23 VITALS
DIASTOLIC BLOOD PRESSURE: 68 MMHG | BODY MASS INDEX: 23.94 KG/M2 | HEART RATE: 74 BPM | SYSTOLIC BLOOD PRESSURE: 112 MMHG | TEMPERATURE: 98 F | WEIGHT: 122.6 LBS | OXYGEN SATURATION: 98 %

## 2019-12-23 DIAGNOSIS — J06.9 VIRAL UPPER RESPIRATORY TRACT INFECTION: Primary | ICD-10-CM

## 2019-12-23 PROCEDURE — 1090F PRES/ABSN URINE INCON ASSESS: CPT | Performed by: NURSE PRACTITIONER

## 2019-12-23 PROCEDURE — 99213 OFFICE O/P EST LOW 20 MIN: CPT | Performed by: NURSE PRACTITIONER

## 2019-12-23 PROCEDURE — 1123F ACP DISCUSS/DSCN MKR DOCD: CPT | Performed by: NURSE PRACTITIONER

## 2019-12-23 PROCEDURE — G8427 DOCREV CUR MEDS BY ELIG CLIN: HCPCS | Performed by: NURSE PRACTITIONER

## 2019-12-23 PROCEDURE — G8420 CALC BMI NORM PARAMETERS: HCPCS | Performed by: NURSE PRACTITIONER

## 2019-12-23 PROCEDURE — 1036F TOBACCO NON-USER: CPT | Performed by: NURSE PRACTITIONER

## 2019-12-23 PROCEDURE — G8399 PT W/DXA RESULTS DOCUMENT: HCPCS | Performed by: NURSE PRACTITIONER

## 2019-12-23 PROCEDURE — 3017F COLORECTAL CA SCREEN DOC REV: CPT | Performed by: NURSE PRACTITIONER

## 2019-12-23 PROCEDURE — 4040F PNEUMOC VAC/ADMIN/RCVD: CPT | Performed by: NURSE PRACTITIONER

## 2019-12-23 PROCEDURE — G8482 FLU IMMUNIZE ORDER/ADMIN: HCPCS | Performed by: NURSE PRACTITIONER

## 2019-12-23 ASSESSMENT — ENCOUNTER SYMPTOMS
SORE THROAT: 1
WHEEZING: 0
SHORTNESS OF BREATH: 0
SINUS PRESSURE: 0
GASTROINTESTINAL NEGATIVE: 1
RHINORRHEA: 1
SINUS COMPLAINT: 1
COUGH: 1

## 2019-12-28 ENCOUNTER — OFFICE VISIT (OUTPATIENT)
Dept: PRIMARY CARE CLINIC | Age: 69
End: 2019-12-28
Payer: MEDICARE

## 2019-12-28 VITALS
WEIGHT: 122 LBS | DIASTOLIC BLOOD PRESSURE: 80 MMHG | SYSTOLIC BLOOD PRESSURE: 118 MMHG | OXYGEN SATURATION: 97 % | BODY MASS INDEX: 23.83 KG/M2 | HEART RATE: 96 BPM

## 2019-12-28 DIAGNOSIS — H00.024 HORDEOLUM INTERNUM OF LEFT UPPER EYELID: Primary | ICD-10-CM

## 2019-12-28 PROCEDURE — 99213 OFFICE O/P EST LOW 20 MIN: CPT | Performed by: PHYSICIAN ASSISTANT

## 2019-12-28 PROCEDURE — G8420 CALC BMI NORM PARAMETERS: HCPCS | Performed by: PHYSICIAN ASSISTANT

## 2019-12-28 PROCEDURE — G8482 FLU IMMUNIZE ORDER/ADMIN: HCPCS | Performed by: PHYSICIAN ASSISTANT

## 2019-12-28 PROCEDURE — 4040F PNEUMOC VAC/ADMIN/RCVD: CPT | Performed by: PHYSICIAN ASSISTANT

## 2019-12-28 PROCEDURE — 3017F COLORECTAL CA SCREEN DOC REV: CPT | Performed by: PHYSICIAN ASSISTANT

## 2019-12-28 PROCEDURE — G8427 DOCREV CUR MEDS BY ELIG CLIN: HCPCS | Performed by: PHYSICIAN ASSISTANT

## 2019-12-28 PROCEDURE — 1090F PRES/ABSN URINE INCON ASSESS: CPT | Performed by: PHYSICIAN ASSISTANT

## 2019-12-28 PROCEDURE — 1123F ACP DISCUSS/DSCN MKR DOCD: CPT | Performed by: PHYSICIAN ASSISTANT

## 2019-12-28 PROCEDURE — 1036F TOBACCO NON-USER: CPT | Performed by: PHYSICIAN ASSISTANT

## 2019-12-28 PROCEDURE — G8399 PT W/DXA RESULTS DOCUMENT: HCPCS | Performed by: PHYSICIAN ASSISTANT

## 2019-12-28 RX ORDER — CEPHALEXIN 500 MG/1
500 CAPSULE ORAL 3 TIMES DAILY
Qty: 21 CAPSULE | Refills: 0 | Status: SHIPPED | OUTPATIENT
Start: 2019-12-28 | End: 2020-01-04

## 2019-12-28 ASSESSMENT — ENCOUNTER SYMPTOMS
EYE ITCHING: 1
COUGH: 1
EYE REDNESS: 1
EYE DISCHARGE: 0
SORE THROAT: 0
SINUS PAIN: 1
EYE PAIN: 1
SINUS PRESSURE: 1
PHOTOPHOBIA: 0
RHINORRHEA: 1

## 2020-06-15 ENCOUNTER — HOSPITAL ENCOUNTER (OUTPATIENT)
Dept: LAB | Age: 70
Discharge: HOME | End: 2020-06-15
Payer: MEDICARE

## 2020-06-15 LAB
ABSOLUTE EOS #: 0.14 K/UL (ref 0–0.44)
ABSOLUTE IMMATURE GRANULOCYTE: <0.03 K/UL (ref 0–0.3)
ABSOLUTE LYMPH #: 1.77 K/UL (ref 1.1–3.7)
ABSOLUTE MONO #: 0.58 K/UL (ref 0.1–1.2)
ALBUMIN SERPL-MCNC: 4.3 G/DL (ref 3.5–5.2)
ALBUMIN/GLOBULIN RATIO: 1.3 (ref 1–2.5)
ALP BLD-CCNC: 75 U/L (ref 35–104)
ALT SERPL-CCNC: 14 U/L (ref 5–33)
ANION GAP SERPL CALCULATED.3IONS-SCNC: 12 MMOL/L (ref 9–17)
AST SERPL-CCNC: 17 U/L
BASOPHILS # BLD: 1 % (ref 0–2)
BASOPHILS ABSOLUTE: 0.06 K/UL (ref 0–0.2)
BILIRUB SERPL-MCNC: 0.7 MG/DL (ref 0.3–1.2)
BUN BLDV-MCNC: 15 MG/DL (ref 8–23)
BUN/CREAT BLD: 25 (ref 9–20)
CALCIUM SERPL-MCNC: 9.2 MG/DL (ref 8.6–10.4)
CHLORIDE BLD-SCNC: 101 MMOL/L (ref 98–107)
CHOLESTEROL/HDL RATIO: 4.1
CHOLESTEROL: 216 MG/DL
CO2: 28 MMOL/L (ref 20–31)
CREAT SERPL-MCNC: 0.59 MG/DL (ref 0.5–0.9)
DIFFERENTIAL TYPE: NORMAL
EOSINOPHILS RELATIVE PERCENT: 2 % (ref 1–4)
GFR AFRICAN AMERICAN: >60 ML/MIN
GFR NON-AFRICAN AMERICAN: >60 ML/MIN
GFR SERPL CREATININE-BSD FRML MDRD: ABNORMAL ML/MIN/{1.73_M2}
GFR SERPL CREATININE-BSD FRML MDRD: ABNORMAL ML/MIN/{1.73_M2}
GLUCOSE BLD-MCNC: 104 MG/DL (ref 70–99)
HCT VFR BLD CALC: 43.1 % (ref 36.3–47.1)
HDLC SERPL-MCNC: 53 MG/DL
HEMOGLOBIN: 14 G/DL (ref 11.9–15.1)
IMMATURE GRANULOCYTES: 0 %
LDL CHOLESTEROL: 134 MG/DL (ref 0–130)
LYMPHOCYTES # BLD: 30 % (ref 24–43)
MCH RBC QN AUTO: 29.7 PG (ref 25.2–33.5)
MCHC RBC AUTO-ENTMCNC: 32.5 G/DL (ref 25.2–33.5)
MCV RBC AUTO: 91.5 FL (ref 82.6–102.9)
MONOCYTES # BLD: 10 % (ref 3–12)
NRBC AUTOMATED: 0 PER 100 WBC
PDW BLD-RTO: 13.7 % (ref 11.8–14.4)
PLATELET # BLD: 209 K/UL (ref 138–453)
PLATELET ESTIMATE: NORMAL
PMV BLD AUTO: 9.9 FL (ref 8.1–13.5)
POTASSIUM SERPL-SCNC: 4.2 MMOL/L (ref 3.7–5.3)
RBC # BLD: 4.71 M/UL (ref 3.95–5.11)
RBC # BLD: NORMAL 10*6/UL
SEG NEUTROPHILS: 57 % (ref 36–65)
SEGMENTED NEUTROPHILS ABSOLUTE COUNT: 3.31 K/UL (ref 1.5–8.1)
SODIUM BLD-SCNC: 141 MMOL/L (ref 135–144)
TOTAL PROTEIN: 7.5 G/DL (ref 6.4–8.3)
TRIGL SERPL-MCNC: 146 MG/DL
VITAMIN D 25-HYDROXY: 46 NG/ML (ref 30–100)
VLDLC SERPL CALC-MCNC: ABNORMAL MG/DL (ref 1–30)
WBC # BLD: 5.9 K/UL (ref 3.5–11.3)
WBC # BLD: NORMAL 10*3/UL

## 2020-06-15 PROCEDURE — 36415 COLL VENOUS BLD VENIPUNCTURE: CPT

## 2020-06-15 PROCEDURE — 80053 COMPREHEN METABOLIC PANEL: CPT

## 2020-06-15 PROCEDURE — 85025 COMPLETE CBC W/AUTO DIFF WBC: CPT

## 2020-06-15 PROCEDURE — 80061 LIPID PANEL: CPT

## 2020-06-15 PROCEDURE — 82306 VITAMIN D 25 HYDROXY: CPT

## 2020-06-19 ENCOUNTER — OFFICE VISIT (OUTPATIENT)
Dept: FAMILY MEDICINE CLINIC | Age: 70
End: 2020-06-19
Payer: MEDICARE

## 2020-06-19 VITALS
DIASTOLIC BLOOD PRESSURE: 64 MMHG | WEIGHT: 121.91 LBS | HEIGHT: 60 IN | SYSTOLIC BLOOD PRESSURE: 116 MMHG | BODY MASS INDEX: 23.94 KG/M2 | TEMPERATURE: 97.7 F | HEART RATE: 68 BPM

## 2020-06-19 PROCEDURE — 1123F ACP DISCUSS/DSCN MKR DOCD: CPT | Performed by: FAMILY MEDICINE

## 2020-06-19 PROCEDURE — 3017F COLORECTAL CA SCREEN DOC REV: CPT | Performed by: FAMILY MEDICINE

## 2020-06-19 PROCEDURE — 1090F PRES/ABSN URINE INCON ASSESS: CPT | Performed by: FAMILY MEDICINE

## 2020-06-19 PROCEDURE — G8399 PT W/DXA RESULTS DOCUMENT: HCPCS | Performed by: FAMILY MEDICINE

## 2020-06-19 PROCEDURE — 99214 OFFICE O/P EST MOD 30 MIN: CPT | Performed by: FAMILY MEDICINE

## 2020-06-19 PROCEDURE — 1036F TOBACCO NON-USER: CPT | Performed by: FAMILY MEDICINE

## 2020-06-19 PROCEDURE — G8420 CALC BMI NORM PARAMETERS: HCPCS | Performed by: FAMILY MEDICINE

## 2020-06-19 PROCEDURE — G8427 DOCREV CUR MEDS BY ELIG CLIN: HCPCS | Performed by: FAMILY MEDICINE

## 2020-06-19 PROCEDURE — 99213 OFFICE O/P EST LOW 20 MIN: CPT

## 2020-06-19 PROCEDURE — 4040F PNEUMOC VAC/ADMIN/RCVD: CPT | Performed by: FAMILY MEDICINE

## 2020-06-19 ASSESSMENT — PATIENT HEALTH QUESTIONNAIRE - PHQ9
SUM OF ALL RESPONSES TO PHQ9 QUESTIONS 1 & 2: 0
1. LITTLE INTEREST OR PLEASURE IN DOING THINGS: 0
SUM OF ALL RESPONSES TO PHQ QUESTIONS 1-9: 0
2. FEELING DOWN, DEPRESSED OR HOPELESS: 0
SUM OF ALL RESPONSES TO PHQ QUESTIONS 1-9: 0

## 2020-06-19 ASSESSMENT — ENCOUNTER SYMPTOMS
CONSTIPATION: 1
EYES NEGATIVE: 1
RESPIRATORY NEGATIVE: 1
ALLERGIC/IMMUNOLOGIC NEGATIVE: 1

## 2020-06-19 NOTE — PATIENT INSTRUCTIONS
Range   Deficiency              <20 ng/mL   Mild Deficiency       20-30 ng/mL   Sufficiency           ng/mL   Toxicity               >100 ng/mL

## 2020-06-19 NOTE — PROGRESS NOTES
 TRACHEOSTOMY  11/14/2017    UPPER GASTROINTESTINAL ENDOSCOPY  1/31/2014    multiple fundic gland polyps benign. hiatal hernia, hemangioma of the tongue and base of the tongue and hypopharnx     No current outpatient medications on file. No current facility-administered medications for this visit. Allergies   Allergen Reactions    Ciprofloxacin      Does not set well with taste d/t g-tube placement    Penicillins      Childhood.  Sulfa Antibiotics     E-Mycin [Erythromycin] Nausea And Vomiting         Review of Systems   Constitutional: Negative. HENT: Negative. Eyes: Negative. Respiratory: Negative. Cardiovascular: Negative. Gastrointestinal: Positive for constipation. Endocrine: Negative. Genitourinary: Negative. Musculoskeletal: Negative. Skin: Negative. Allergic/Immunologic: Negative. Neurological: Negative. Hematological: Negative. Psychiatric/Behavioral: Negative. Objective:   Physical Exam  Constitutional:       General: She is not in acute distress. Appearance: She is well-developed. HENT:      Head: Normocephalic and atraumatic. Right Ear: External ear normal.      Left Ear: External ear normal.      Mouth/Throat:      Mouth: Oral lesions (chronic hemangioma changes in lips, tongue, inner mucousa of mouth. ) present. Pharynx: No oropharyngeal exudate. Eyes:      General: No scleral icterus. Conjunctiva/sclera: Conjunctivae normal.   Neck:      Musculoskeletal: Neck supple. Thyroid: No thyromegaly. Trachea: No tracheal deviation. Cardiovascular:      Rate and Rhythm: Normal rate and regular rhythm. Heart sounds: Normal heart sounds. No murmur. Pulmonary:      Effort: Pulmonary effort is normal. No respiratory distress. Breath sounds: Normal breath sounds. No wheezing. Abdominal:      General: Bowel sounds are normal. There is no distension. Palpations: Abdomen is soft. There is no mass. Tenderness: There is no abdominal tenderness. There is no guarding or rebound. Musculoskeletal: Normal range of motion. General: No tenderness. Lymphadenopathy:      Cervical: No cervical adenopathy. Skin:     General: Skin is warm and dry. Findings: No erythema or rash. Neurological:      Mental Status: She is alert and oriented to person, place, and time. Psychiatric:         Behavior: Behavior normal.         Thought Content:  Thought content normal.         Judgment: Judgment normal.       /64 (Site: Right Upper Arm, Position: Sitting, Cuff Size: Small Adult)   Pulse 68   Temp 97.7 °F (36.5 °C) (Temporal)   Ht 5' (1.524 m)   Wt 121 lb 14.6 oz (55.3 kg)   LMP 05/06/2000   BMI 23.81 kg/m²     Hospital Outpatient Visit on 06/15/2020   Component Date Value Ref Range Status    WBC 06/15/2020 5.9  3.5 - 11.3 k/uL Final    RBC 06/15/2020 4.71  3.95 - 5.11 m/uL Final    Hemoglobin 06/15/2020 14.0  11.9 - 15.1 g/dL Final    Hematocrit 06/15/2020 43.1  36.3 - 47.1 % Final    MCV 06/15/2020 91.5  82.6 - 102.9 fL Final    MCH 06/15/2020 29.7  25.2 - 33.5 pg Final    MCHC 06/15/2020 32.5  25.2 - 33.5 g/dL Final    RDW 06/15/2020 13.7  11.8 - 14.4 % Final    Platelets 26/46/4731 209  138 - 453 k/uL Final    MPV 06/15/2020 9.9  8.1 - 13.5 fL Final    NRBC Automated 06/15/2020 0.0  0.0 per 100 WBC Final    Differential Type 06/15/2020 NOT REPORTED   Final    Seg Neutrophils 06/15/2020 57  36 - 65 % Final    Lymphocytes 06/15/2020 30  24 - 43 % Final    Monocytes 06/15/2020 10  3 - 12 % Final    Eosinophils % 06/15/2020 2  1 - 4 % Final    Basophils 06/15/2020 1  0 - 2 % Final    Immature Granulocytes 06/15/2020 0  0 % Final    Segs Absolute 06/15/2020 3.31  1.50 - 8.10 k/uL Final    Absolute Lymph # 06/15/2020 1.77  1.10 - 3.70 k/uL Final    Absolute Mono # 06/15/2020 0.58  0.10 - 1.20 k/uL Final    Absolute Eos # 06/15/2020 0.14  0.00 - 0.44 k/uL Final    Basophils Absolute 06/15/2020 0.06  0.00 - 0.20 k/uL Final    Absolute Immature Granulocyte 06/15/2020 <0.03  0.00 - 0.30 k/uL Final    WBC Morphology 06/15/2020 NOT REPORTED   Final    RBC Morphology 06/15/2020 NOT REPORTED   Final    Platelet Estimate 40/14/7174 NOT REPORTED   Final    Glucose 06/15/2020 104* 70 - 99 mg/dL Final    BUN 06/15/2020 15  8 - 23 mg/dL Final    CREATININE 06/15/2020 0.59  0.50 - 0.90 mg/dL Final    Bun/Cre Ratio 06/15/2020 25* 9 - 20 Final    Calcium 06/15/2020 9.2  8.6 - 10.4 mg/dL Final    Sodium 06/15/2020 141  135 - 144 mmol/L Final    Potassium 06/15/2020 4.2  3.7 - 5.3 mmol/L Final    Chloride 06/15/2020 101  98 - 107 mmol/L Final    CO2 06/15/2020 28  20 - 31 mmol/L Final    Anion Gap 06/15/2020 12  9 - 17 mmol/L Final    Alkaline Phosphatase 06/15/2020 75  35 - 104 U/L Final    ALT 06/15/2020 14  5 - 33 U/L Final    AST 06/15/2020 17  <32 U/L Final    Total Bilirubin 06/15/2020 0.70  0.3 - 1.2 mg/dL Final    Total Protein 06/15/2020 7.5  6.4 - 8.3 g/dL Final    Alb 06/15/2020 4.3  3.5 - 5.2 g/dL Final    Albumin/Globulin Ratio 06/15/2020 1.3  1.0 - 2.5 Final    GFR Non- 06/15/2020 >60  >60 mL/min Final    GFR  06/15/2020 >60  >60 mL/min Final    GFR Comment 06/15/2020        Final    GFR Staging 06/15/2020 NOT REPORTED   Final    Cholesterol 06/15/2020 216* <200 mg/dL Final    HDL 06/15/2020 53  >40 mg/dL Final    LDL Cholesterol 06/15/2020 134* 0 - 130 mg/dL Final    Chol/HDL Ratio 06/15/2020 4.1  <5 Final    Triglycerides 06/15/2020 146  <150 mg/dL Final    VLDL 06/15/2020 NOT REPORTED  1 - 30 mg/dL Final    Vit D, 25-Hydroxy 06/15/2020 46.0  30.0 - 100.0 ng/mL Final       Assessment:       Encounter Diagnoses   Name Primary?     Supraventricular tachycardia (HCC) Yes    Pure hypercholesterolemia     Essential hypertension     Seasonal allergic rhinitis due to pollen     Impaired fasting blood sugar     Sigmoid

## 2020-09-14 ENCOUNTER — IMMUNIZATION (OUTPATIENT)
Dept: LAB | Age: 70
End: 2020-09-14
Payer: MEDICARE

## 2020-09-14 PROCEDURE — G0008 ADMIN INFLUENZA VIRUS VAC: HCPCS | Performed by: FAMILY MEDICINE

## 2020-12-15 ENCOUNTER — HOSPITAL ENCOUNTER (OUTPATIENT)
Dept: LAB | Age: 70
Discharge: HOME | End: 2020-12-15
Payer: MEDICARE

## 2020-12-15 LAB
ABSOLUTE EOS #: 0.08 K/UL (ref 0–0.44)
ABSOLUTE IMMATURE GRANULOCYTE: 0.04 K/UL (ref 0–0.3)
ABSOLUTE LYMPH #: 2.14 K/UL (ref 1.1–3.7)
ABSOLUTE MONO #: 0.59 K/UL (ref 0.1–1.2)
ALBUMIN SERPL-MCNC: 4.4 G/DL (ref 3.5–5.2)
ALBUMIN/GLOBULIN RATIO: 1.3 (ref 1–2.5)
ALP BLD-CCNC: 81 U/L (ref 35–104)
ALT SERPL-CCNC: 17 U/L (ref 5–33)
ANION GAP SERPL CALCULATED.3IONS-SCNC: 11 MMOL/L (ref 9–17)
AST SERPL-CCNC: 19 U/L
BASOPHILS # BLD: 1 % (ref 0–2)
BASOPHILS ABSOLUTE: 0.06 K/UL (ref 0–0.2)
BILIRUB SERPL-MCNC: 0.63 MG/DL (ref 0.3–1.2)
BUN BLDV-MCNC: 16 MG/DL (ref 8–23)
BUN/CREAT BLD: 21 (ref 9–20)
CALCIUM SERPL-MCNC: 10 MG/DL (ref 8.6–10.4)
CHLORIDE BLD-SCNC: 103 MMOL/L (ref 98–107)
CHOLESTEROL/HDL RATIO: 4.1
CHOLESTEROL: 234 MG/DL
CO2: 28 MMOL/L (ref 20–31)
CREAT SERPL-MCNC: 0.76 MG/DL (ref 0.5–0.9)
DIFFERENTIAL TYPE: ABNORMAL
EOSINOPHILS RELATIVE PERCENT: 1 % (ref 1–4)
ESTIMATED AVERAGE GLUCOSE: 128 MG/DL
GFR AFRICAN AMERICAN: >60 ML/MIN
GFR NON-AFRICAN AMERICAN: >60 ML/MIN
GFR SERPL CREATININE-BSD FRML MDRD: ABNORMAL ML/MIN/{1.73_M2}
GFR SERPL CREATININE-BSD FRML MDRD: ABNORMAL ML/MIN/{1.73_M2}
GLUCOSE BLD-MCNC: 115 MG/DL (ref 70–99)
HBA1C MFR BLD: 6.1 % (ref 4.8–5.9)
HCT VFR BLD CALC: 43.9 % (ref 36.3–47.1)
HDLC SERPL-MCNC: 57 MG/DL
HEMOGLOBIN: 14.2 G/DL (ref 11.9–15.1)
IMMATURE GRANULOCYTES: 1 %
LDL CHOLESTEROL: 149 MG/DL (ref 0–130)
LYMPHOCYTES # BLD: 32 % (ref 24–43)
MCH RBC QN AUTO: 30.5 PG (ref 25.2–33.5)
MCHC RBC AUTO-ENTMCNC: 32.3 G/DL (ref 25.2–33.5)
MCV RBC AUTO: 94.2 FL (ref 82.6–102.9)
MONOCYTES # BLD: 9 % (ref 3–12)
NRBC AUTOMATED: 0 PER 100 WBC
PDW BLD-RTO: 13.2 % (ref 11.8–14.4)
PLATELET # BLD: 214 K/UL (ref 138–453)
PLATELET ESTIMATE: ABNORMAL
PMV BLD AUTO: 9.8 FL (ref 8.1–13.5)
POTASSIUM SERPL-SCNC: 4.1 MMOL/L (ref 3.7–5.3)
RBC # BLD: 4.66 M/UL (ref 3.95–5.11)
RBC # BLD: ABNORMAL 10*6/UL
SEG NEUTROPHILS: 56 % (ref 36–65)
SEGMENTED NEUTROPHILS ABSOLUTE COUNT: 3.69 K/UL (ref 1.5–8.1)
SODIUM BLD-SCNC: 142 MMOL/L (ref 135–144)
TOTAL PROTEIN: 7.8 G/DL (ref 6.4–8.3)
TRIGL SERPL-MCNC: 141 MG/DL
VLDLC SERPL CALC-MCNC: ABNORMAL MG/DL (ref 1–30)
WBC # BLD: 6.6 K/UL (ref 3.5–11.3)
WBC # BLD: ABNORMAL 10*3/UL

## 2020-12-15 PROCEDURE — 36415 COLL VENOUS BLD VENIPUNCTURE: CPT

## 2020-12-15 PROCEDURE — 85025 COMPLETE CBC W/AUTO DIFF WBC: CPT

## 2020-12-15 PROCEDURE — 80061 LIPID PANEL: CPT

## 2020-12-15 PROCEDURE — 83036 HEMOGLOBIN GLYCOSYLATED A1C: CPT

## 2020-12-15 PROCEDURE — 80053 COMPREHEN METABOLIC PANEL: CPT

## 2020-12-21 ENCOUNTER — VIRTUAL VISIT (OUTPATIENT)
Dept: FAMILY MEDICINE CLINIC | Age: 70
End: 2020-12-21
Payer: MEDICARE

## 2020-12-21 PROCEDURE — 99214 OFFICE O/P EST MOD 30 MIN: CPT | Performed by: FAMILY MEDICINE

## 2020-12-21 PROCEDURE — 99211 OFF/OP EST MAY X REQ PHY/QHP: CPT

## 2020-12-21 RX ORDER — DOCUSATE SODIUM 100 MG/1
100 CAPSULE, LIQUID FILLED ORAL PRN
COMMUNITY

## 2020-12-21 ASSESSMENT — ENCOUNTER SYMPTOMS
EYES NEGATIVE: 1
ALLERGIC/IMMUNOLOGIC NEGATIVE: 1
CONSTIPATION: 1
RESPIRATORY NEGATIVE: 1

## 2020-12-21 NOTE — PROGRESS NOTES
Subjective:      Patient ID: Melva Roque is a 65 y.o. female. Hypertension       Routine follow up on chronic medical conditions, refills, and review of updated labs. I have reviewed the patient's medical history in detail and updated the computerized patient record. teleconference visit scheduled today during covid 19 restrictions. Patient consented to use personal cell phone to connect the visit at home, alone. I am in the office using office phone to connect. She is feeling fairly well at present. 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 5 sclerotherapy treatments. Some reconstruction surgery of tracheostomy closure and lower left lip. She has completed procedures and is now working with dentist.  She needs some bone addition to mandibular area by description. Planning bracing and other dental work after that. invisiline braces being used at present. allergies quiescent. No significant gerd symptoms. Non compliant with cpap. She thinks interventions have improved this condition. No palpitations of concern. No bowel or bladder issues. Using prunes to help with constipation. No covid concerns at present. Staying home for the most part. Past Medical History:   Diagnosis Date    Allergic rhinitis     GERD (gastroesophageal reflux disease)     Hemangioma of face     And arm.  Impaired fasting blood sugar     Internal hemorrhoids     Irritable bowel syndrome     Menometrorrhagia     Severe.  Mitral valve prolapse     Obstructive sleep apnea     non compliant with cpap    Osteoarthritis     Sigmoid diverticulosis     Supraventricular tachycardia [facility])      Past Surgical History:   Procedure Laterality Date    BREAST LUMPECTOMY Right 11/18/1987    Benign, right upper quadrant.     CATARACT REMOVAL WITH IMPLANT Left 11/08/2016    CATARACT REMOVAL WITH IMPLANT  12/06/2016    CHOLECYSTECTOMY  03/20/2000    COLONOSCOPY  06/12/2009    Showed sigmoid diverticulosis and internal hemorrhoids.  COLONOSCOPY  11/19/2014    diverticulosis, rectal polyp hyperplastic    GASTROSTOMY TUBE PLACEMENT  01/08/2018    MOUTH SURGERY      OTHER SURGICAL HISTORY      Scleratherapy-tongue, bottom lip and chin    CANDIDA AND BSO  05/2000    With leiomyoma noted.  TONSILLECTOMY      TRACHEOSTOMY  11/14/2017    UPPER GASTROINTESTINAL ENDOSCOPY  1/31/2014    multiple fundic gland polyps benign. hiatal hernia, hemangioma of the tongue and base of the tongue and hypopharnx     Current Outpatient Medications   Medication Sig Dispense Refill    docusate sodium (COLACE) 100 MG capsule Take 100 mg by mouth as needed for Constipation      Calcium-Magnesium-Vitamin D (CITRACAL CALCIUM+D PO) Take by mouth       No current facility-administered medications for this visit. Allergies   Allergen Reactions    Ciprofloxacin      Does not set well with taste d/t g-tube placement    Penicillins      Childhood.  Sulfa Antibiotics     E-Mycin [Erythromycin] Nausea And Vomiting         Review of Systems   Constitutional: Negative. HENT: Negative. Eyes: Negative. Respiratory: Negative. Cardiovascular: Negative. Gastrointestinal: Positive for constipation. Endocrine: Negative. Genitourinary: Negative. Musculoskeletal: Negative. Skin: Negative. Allergic/Immunologic: Negative. Neurological: Negative. Hematological: Negative. Psychiatric/Behavioral: Negative. Objective:   Physical Exam  Constitutional:       General: She is not in acute distress. Pulmonary:      Effort: Pulmonary effort is normal. No respiratory distress. Neurological:      Mental Status: She is alert. Mental status is at baseline. Psychiatric:         Mood and Affect: Mood normal.         Behavior: Behavior normal.         Thought Content:  Thought content normal.         Judgment: Judgment normal.       LMP 05/06/2000   Patient-Reported Vitals 12/21/2020 12/15/2020 21* 9 - 20 Final    Calcium 12/15/2020 10.0  8.6 - 10.4 mg/dL Final    Sodium 12/15/2020 142  135 - 144 mmol/L Final    Potassium 12/15/2020 4.1  3.7 - 5.3 mmol/L Final    Chloride 12/15/2020 103  98 - 107 mmol/L Final    CO2 12/15/2020 28  20 - 31 mmol/L Final    Anion Gap 12/15/2020 11  9 - 17 mmol/L Final    Alkaline Phosphatase 12/15/2020 81  35 - 104 U/L Final    ALT 12/15/2020 17  5 - 33 U/L Final    AST 12/15/2020 19  <32 U/L Final    Total Bilirubin 12/15/2020 0.63  0.3 - 1.2 mg/dL Final    Total Protein 12/15/2020 7.8  6.4 - 8.3 g/dL Final    Alb 12/15/2020 4.4  3.5 - 5.2 g/dL Final    Albumin/Globulin Ratio 12/15/2020 1.3  1.0 - 2.5 Final    GFR Non- 12/15/2020 >60  >60 mL/min Final    GFR  12/15/2020 >60  >60 mL/min Final    GFR Comment 12/15/2020        Final    GFR Staging 12/15/2020 NOT REPORTED   Final    Cholesterol 12/15/2020 234* <200 mg/dL Final    HDL 12/15/2020 57  >40 mg/dL Final    LDL Cholesterol 12/15/2020 149* 0 - 130 mg/dL Final    Chol/HDL Ratio 12/15/2020 4.1  <5 Final    Triglycerides 12/15/2020 141  <150 mg/dL Final    VLDL 12/15/2020 NOT REPORTED  1 - 30 mg/dL Final       Assessment:       Encounter Diagnoses   Name Primary?  Supraventricular tachycardia ([facility] Yes    Tracheostomy status ([facility]     Essential hypertension     Pure hypercholesterolemia     Seasonal allergic rhinitis due to pollen     Impaired fasting blood sugar     Sigmoid diverticulosis     Colon cancer screening     Obstructive sleep apnea     Arteriovenous malformation, other site             Plan:       SVT: no concerns at present. Unaware of any recurrent episodes    Dizziness : very vague on review. chiropractor thought more bppv, I thought more orthostatic in the past. No concerning symptoms on review. Opting to observe. No significant issues over the interval.      Htn: no active treatment at present. Cont.  Observation, salt avoidance, monitoring    Hyperlipidemia: variable numbers, ldl 126 to 182. Discussed     Allergies: no current concerns. Has prn meds if needed. IFBS : mild/stable. Cont. Dietary discretion. a1c 6.1%. Diverticulosis; no symptoms long term. Fiber supplement recommended. Some constipation, doing better on colace , considering metamucil/    Colonoscopy up to date 2014. Mammogram in July. Low back pain. Mild/chronic. Not bothersome at present. New mattress in the last year. danielito: 2009 sleep study showing frequent danielito with desats. She did start cpap, but ultimately did not tolerate it. She felt too warm and pressure seemed too high by description. She tried different masks and ultimately stopped using. She had restarted cpap and seems to be tolerating it ok. She notes some perceived improvement in sleep/drowsiness issues now. Not compliant at present. Still with mask problems due to her anatomy. Denies drowsiness. Hemangioma of face and tongue. Some suggestion of progression with more fullness in the mouth and left cheek, more difficulty. She has had some bleeding from the right side of the tongue near the tip. She has had 5 sclerotherapy procedures in [facility] and several small surgeries. Doing better at present . She notices the tongue and cheek are smaller. Speech is easier for others to understand. Tracheostomy has healed well. She had a 3 layer closure. She has had multiple reconstructive surgeries on the lower lip area. Much different appearance for her, doing well. Wearing invisiline braces at present. She is refusing repeat colonoscopy, history of polyp. She felt wiped out but the prep the last time. Willing to do cologuard.

## 2021-02-15 ENCOUNTER — IMMUNIZATION (OUTPATIENT)
Dept: LAB | Age: 71
End: 2021-02-15
Payer: MEDICARE

## 2021-02-15 PROCEDURE — 91301 COVID-19, MODERNA VACCINE 100MCG/0.5ML DOSE: CPT

## 2021-03-04 ENCOUNTER — OFFICE VISIT (OUTPATIENT)
Dept: PRIMARY CARE CLINIC | Age: 71
End: 2021-03-04
Payer: MEDICARE

## 2021-03-04 VITALS
SYSTOLIC BLOOD PRESSURE: 122 MMHG | OXYGEN SATURATION: 98 % | TEMPERATURE: 98 F | BODY MASS INDEX: 25.39 KG/M2 | HEART RATE: 88 BPM | DIASTOLIC BLOOD PRESSURE: 72 MMHG | WEIGHT: 130 LBS

## 2021-03-04 DIAGNOSIS — H00.014 HORDEOLUM EXTERNUM OF LEFT UPPER EYELID: Primary | ICD-10-CM

## 2021-03-04 PROCEDURE — G8427 DOCREV CUR MEDS BY ELIG CLIN: HCPCS | Performed by: FAMILY MEDICINE

## 2021-03-04 PROCEDURE — 4040F PNEUMOC VAC/ADMIN/RCVD: CPT | Performed by: FAMILY MEDICINE

## 2021-03-04 PROCEDURE — 99213 OFFICE O/P EST LOW 20 MIN: CPT | Performed by: FAMILY MEDICINE

## 2021-03-04 PROCEDURE — 99212 OFFICE O/P EST SF 10 MIN: CPT

## 2021-03-04 PROCEDURE — 1090F PRES/ABSN URINE INCON ASSESS: CPT | Performed by: FAMILY MEDICINE

## 2021-03-04 PROCEDURE — 3017F COLORECTAL CA SCREEN DOC REV: CPT | Performed by: FAMILY MEDICINE

## 2021-03-04 PROCEDURE — 1036F TOBACCO NON-USER: CPT | Performed by: FAMILY MEDICINE

## 2021-03-04 PROCEDURE — 1123F ACP DISCUSS/DSCN MKR DOCD: CPT | Performed by: FAMILY MEDICINE

## 2021-03-04 PROCEDURE — G8484 FLU IMMUNIZE NO ADMIN: HCPCS | Performed by: FAMILY MEDICINE

## 2021-03-04 PROCEDURE — G8399 PT W/DXA RESULTS DOCUMENT: HCPCS | Performed by: FAMILY MEDICINE

## 2021-03-04 PROCEDURE — G8417 CALC BMI ABV UP PARAM F/U: HCPCS | Performed by: FAMILY MEDICINE

## 2021-03-04 RX ORDER — CEPHALEXIN 500 MG/1
500 CAPSULE ORAL 3 TIMES DAILY
Qty: 21 CAPSULE | Refills: 0 | Status: SHIPPED | OUTPATIENT
Start: 2021-03-04 | End: 2021-03-05

## 2021-03-04 ASSESSMENT — ENCOUNTER SYMPTOMS
CHEST TIGHTNESS: 0
COUGH: 0
WHEEZING: 0
SHORTNESS OF BREATH: 0

## 2021-03-04 ASSESSMENT — PATIENT HEALTH QUESTIONNAIRE - PHQ9
SUM OF ALL RESPONSES TO PHQ QUESTIONS 1-9: 0
SUM OF ALL RESPONSES TO PHQ9 QUESTIONS 1 & 2: 0
1. LITTLE INTEREST OR PLEASURE IN DOING THINGS: 0

## 2021-03-04 NOTE — PROGRESS NOTES
[address] [place] [address]  Dept: [phone]  Dept Fax: [phone]  Loc: [phone]    Janneth Rodriguez is a 65 y.o. female who presents today for her medical conditions/complaints as noted below. Janneth Rodriguez is c/o of   Chief Complaint   Patient presents with    Stye     left eye        HPI:     HPI Here today for a stye. She has a stye on her left upper eye lid for the past 6 days. It has been very red and it is looking like there is some purulent exudate in the papule. She has no had any issues with fevers. She has mild pain in the area. No blurry vision. She not had drainage coming out it. She has been using warm compresses on it, but it has not improved the swelling. Past Medical History:   Diagnosis Date    Allergic rhinitis     GERD (gastroesophageal reflux disease)     Hemangioma of face     And arm.  Impaired fasting blood sugar     Internal hemorrhoids     Irritable bowel syndrome     Menometrorrhagia     Severe.  Mitral valve prolapse     Obstructive sleep apnea     non compliant with cpap    Osteoarthritis     Sigmoid diverticulosis     Supraventricular tachycardia (HCC)           Social History     Tobacco Use    Smoking status: Never Smoker    Smokeless tobacco: Never Used   Substance Use Topics    Alcohol use: No     Alcohol/week: 0.0 standard drinks     Current Outpatient Medications   Medication Sig Dispense Refill    cephALEXin (KEFLEX) 500 MG capsule Take 1 capsule by mouth 3 times daily for 7 days 21 capsule 0    docusate sodium (COLACE) 100 MG capsule Take 100 mg by mouth as needed for Constipation      Calcium-Magnesium-Vitamin D (CITRACAL CALCIUM+D PO) Take by mouth       No current facility-administered medications for this visit.            Allergies   Allergen Reactions    Ciprofloxacin      Does not set well with taste d/t g-tube placement Medications    cephALEXin (KEFLEX) 500 MG capsule     Sig: Take 1 capsule by mouth 3 times daily for 7 days     Dispense:  21 capsule     Refill:  0       Patientgiven educational materials - see patient instructions. Discussed use, benefit,and side effects of prescribed medications. All patient questions answered. Ptvoiced understanding. Reviewed health maintenance. Instructed to continue currentmedications, diet and exercise. Patient agreed with treatment plan. Follow up asdirected.      Electronically signed by Sonja Melgar MD on 3/4/2021 at 1:42 PM

## 2021-03-05 ENCOUNTER — TELEPHONE (OUTPATIENT)
Dept: FAMILY MEDICINE CLINIC | Age: 71
End: 2021-03-05

## 2021-03-05 RX ORDER — ERYTHROMYCIN 5 MG/G
OINTMENT OPHTHALMIC
Qty: 3.5 G | Refills: 0 | Status: SHIPPED | OUTPATIENT
Start: 2021-03-05 | End: 2021-03-15
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
## 2021-03-05 NOTE — TELEPHONE ENCOUNTER
I sent in a topical ointment that should work for her. For the record I don't think the keflex is causing her leg pain, but she can stop it.

## 2021-03-05 NOTE — TELEPHONE ENCOUNTER
Pt stated the Keflex medication she received yesterday for the sty on her eye was casing her to have pain in her left leg and buttock.  Pt stated she wanted to see if she could get a new medication for the sty

## 2021-03-06 ENCOUNTER — TELEPHONE (OUTPATIENT)
Dept: PRIMARY CARE CLINIC | Age: 71
End: 2021-03-06

## 2021-03-06 DIAGNOSIS — Z79.899 MEDICATION THERAPY CHANGED: ICD-10-CM

## 2021-03-06 DIAGNOSIS — H00.014 HORDEOLUM EXTERNUM LEFT UPPER EYELID: Primary | ICD-10-CM

## 2021-03-06 DIAGNOSIS — Z79.899 MEDICATION COURSE CHANGED: ICD-10-CM

## 2021-03-06 RX ORDER — GINSENG 100 MG
CAPSULE ORAL
Qty: 1 TUBE | Refills: 0 | Status: SHIPPED
Start: 2021-03-06 | End: 2021-03-06 | Stop reason: CLARIF

## 2021-03-06 RX ORDER — BACITRACIN 500 [USP'U]/G
OINTMENT OPHTHALMIC EVERY 4 HOURS
Qty: 1 TUBE | Refills: 0 | Status: SHIPPED | OUTPATIENT
Start: 2021-03-06 | End: 2021-03-13

## 2021-03-06 NOTE — TELEPHONE ENCOUNTER
Pt called in stating that the erythromycin that [facility] prescribed her yesterday for her sty. According to her allergy list, she is allergic to the med. Pharmacy will not give her the script. Can you please prescribe something else? There is a previous telephone encounter and notes from 3/4/21. Pt uses [facility]. Pt would like a call back.

## 2021-03-15 ENCOUNTER — IMMUNIZATION (OUTPATIENT)
Dept: LAB | Age: 71
End: 2021-03-15
Payer: MEDICARE

## 2021-03-15 PROCEDURE — 91301 COVID-19, MODERNA VACCINE 100MCG/0.5ML DOSE: CPT

## 2021-04-28 DIAGNOSIS — Z12.31 SCREENING MAMMOGRAM, ENCOUNTER FOR: Primary | ICD-10-CM

## 2021-05-24 ENCOUNTER — HOSPITAL ENCOUNTER (OUTPATIENT)
Dept: LAB | Age: 71
Discharge: HOME | End: 2021-05-24
Payer: MEDICARE

## 2021-05-24 DIAGNOSIS — I10 ESSENTIAL HYPERTENSION: ICD-10-CM

## 2021-05-24 DIAGNOSIS — E78.00 PURE HYPERCHOLESTEROLEMIA: ICD-10-CM

## 2021-05-24 DIAGNOSIS — R73.01 IMPAIRED FASTING BLOOD SUGAR: ICD-10-CM

## 2021-05-24 LAB
ABSOLUTE EOS #: 0.13 K/UL (ref 0–0.44)
ABSOLUTE IMMATURE GRANULOCYTE: 0.03 K/UL (ref 0–0.3)
ABSOLUTE LYMPH #: 1.76 K/UL (ref 1.1–3.7)
ABSOLUTE MONO #: 0.52 K/UL (ref 0.1–1.2)
ALBUMIN SERPL-MCNC: 4.3 G/DL (ref 3.5–5.2)
ALBUMIN/GLOBULIN RATIO: 1.3 (ref 1–2.5)
ALP BLD-CCNC: 77 U/L (ref 35–104)
ALT SERPL-CCNC: 14 U/L (ref 5–33)
ANION GAP SERPL CALCULATED.3IONS-SCNC: 9 MMOL/L (ref 9–17)
AST SERPL-CCNC: 22 U/L
BASOPHILS # BLD: 1 % (ref 0–2)
BASOPHILS ABSOLUTE: 0.06 K/UL (ref 0–0.2)
BILIRUB SERPL-MCNC: 0.43 MG/DL (ref 0.3–1.2)
BUN BLDV-MCNC: 18 MG/DL (ref 8–23)
BUN/CREAT BLD: 26 (ref 9–20)
CALCIUM SERPL-MCNC: 9.2 MG/DL (ref 8.6–10.4)
CHLORIDE BLD-SCNC: 105 MMOL/L (ref 98–107)
CHOLESTEROL/HDL RATIO: 4.1
CHOLESTEROL: 215 MG/DL
CO2: 28 MMOL/L (ref 20–31)
CREAT SERPL-MCNC: 0.68 MG/DL (ref 0.5–0.9)
DIFFERENTIAL TYPE: ABNORMAL
EOSINOPHILS RELATIVE PERCENT: 2 % (ref 1–4)
ESTIMATED AVERAGE GLUCOSE: 128 MG/DL
GFR AFRICAN AMERICAN: >60 ML/MIN
GFR NON-AFRICAN AMERICAN: >60 ML/MIN
GFR SERPL CREATININE-BSD FRML MDRD: ABNORMAL ML/MIN/{1.73_M2}
GFR SERPL CREATININE-BSD FRML MDRD: ABNORMAL ML/MIN/{1.73_M2}
GLUCOSE BLD-MCNC: 109 MG/DL (ref 70–99)
HBA1C MFR BLD: 6.1 % (ref 4–6)
HCT VFR BLD CALC: 41 % (ref 36.3–47.1)
HDLC SERPL-MCNC: 52 MG/DL
HEMOGLOBIN: 13.3 G/DL (ref 11.9–15.1)
IMMATURE GRANULOCYTES: 1 %
LDL CHOLESTEROL: 138 MG/DL (ref 0–130)
LYMPHOCYTES # BLD: 29 % (ref 24–43)
MCH RBC QN AUTO: 30.5 PG (ref 25.2–33.5)
MCHC RBC AUTO-ENTMCNC: 32.4 G/DL (ref 25.2–33.5)
MCV RBC AUTO: 94 FL (ref 82.6–102.9)
MONOCYTES # BLD: 9 % (ref 3–12)
NRBC AUTOMATED: 0 PER 100 WBC
PDW BLD-RTO: 13.2 % (ref 11.8–14.4)
PLATELET # BLD: 215 K/UL (ref 138–453)
PLATELET ESTIMATE: ABNORMAL
PMV BLD AUTO: 9.6 FL (ref 8.1–13.5)
POTASSIUM SERPL-SCNC: 4.6 MMOL/L (ref 3.7–5.3)
RBC # BLD: 4.36 M/UL (ref 3.95–5.11)
RBC # BLD: ABNORMAL 10*6/UL
SEG NEUTROPHILS: 58 % (ref 36–65)
SEGMENTED NEUTROPHILS ABSOLUTE COUNT: 3.54 K/UL (ref 1.5–8.1)
SODIUM BLD-SCNC: 142 MMOL/L (ref 135–144)
TOTAL PROTEIN: 7.7 G/DL (ref 6.4–8.3)
TRIGL SERPL-MCNC: 123 MG/DL
VLDLC SERPL CALC-MCNC: ABNORMAL MG/DL (ref 1–30)
WBC # BLD: 6 K/UL (ref 3.5–11.3)
WBC # BLD: ABNORMAL 10*3/UL

## 2021-05-24 PROCEDURE — 36415 COLL VENOUS BLD VENIPUNCTURE: CPT

## 2021-05-24 PROCEDURE — 80053 COMPREHEN METABOLIC PANEL: CPT

## 2021-05-24 PROCEDURE — 85025 COMPLETE CBC W/AUTO DIFF WBC: CPT

## 2021-05-24 PROCEDURE — 83036 HEMOGLOBIN GLYCOSYLATED A1C: CPT

## 2021-05-24 PROCEDURE — 80061 LIPID PANEL: CPT

## 2021-05-25 ENCOUNTER — HOSPITAL ENCOUNTER (OUTPATIENT)
Dept: MAMMOGRAPHY | Age: 71
Discharge: HOME | End: 2021-05-27
Payer: MEDICARE

## 2021-05-25 DIAGNOSIS — Z12.31 SCREENING MAMMOGRAM, ENCOUNTER FOR: ICD-10-CM

## 2021-05-25 PROCEDURE — 77063 BREAST TOMOSYNTHESIS BI: CPT

## 2021-06-03 ENCOUNTER — TELEPHONE (OUTPATIENT)
Dept: SURGERY | Age: 71
End: 2021-06-03

## 2021-06-03 ENCOUNTER — OFFICE VISIT (OUTPATIENT)
Dept: FAMILY MEDICINE CLINIC | Age: 71
End: 2021-06-03
Payer: MEDICARE

## 2021-06-03 VITALS
HEIGHT: 60 IN | WEIGHT: 131 LBS | HEART RATE: 68 BPM | BODY MASS INDEX: 25.72 KG/M2 | DIASTOLIC BLOOD PRESSURE: 64 MMHG | SYSTOLIC BLOOD PRESSURE: 118 MMHG

## 2021-06-03 DIAGNOSIS — G47.33 OBSTRUCTIVE SLEEP APNEA: ICD-10-CM

## 2021-06-03 DIAGNOSIS — J30.1 SEASONAL ALLERGIC RHINITIS DUE TO POLLEN: ICD-10-CM

## 2021-06-03 DIAGNOSIS — I47.1 SUPRAVENTRICULAR TACHYCARDIA (HCC): ICD-10-CM

## 2021-06-03 DIAGNOSIS — Z12.11 COLON CANCER SCREENING: ICD-10-CM

## 2021-06-03 DIAGNOSIS — E78.00 PURE HYPERCHOLESTEROLEMIA: ICD-10-CM

## 2021-06-03 DIAGNOSIS — K57.30 SIGMOID DIVERTICULOSIS: ICD-10-CM

## 2021-06-03 DIAGNOSIS — R73.01 IMPAIRED FASTING BLOOD SUGAR: ICD-10-CM

## 2021-06-03 DIAGNOSIS — I10 ESSENTIAL HYPERTENSION: ICD-10-CM

## 2021-06-03 DIAGNOSIS — Z12.11 ENCOUNTER FOR SCREENING COLONOSCOPY: Primary | ICD-10-CM

## 2021-06-03 PROCEDURE — 1090F PRES/ABSN URINE INCON ASSESS: CPT | Performed by: FAMILY MEDICINE

## 2021-06-03 PROCEDURE — G8427 DOCREV CUR MEDS BY ELIG CLIN: HCPCS | Performed by: FAMILY MEDICINE

## 2021-06-03 PROCEDURE — 3017F COLORECTAL CA SCREEN DOC REV: CPT | Performed by: FAMILY MEDICINE

## 2021-06-03 PROCEDURE — 99213 OFFICE O/P EST LOW 20 MIN: CPT

## 2021-06-03 PROCEDURE — G8399 PT W/DXA RESULTS DOCUMENT: HCPCS | Performed by: FAMILY MEDICINE

## 2021-06-03 PROCEDURE — 99214 OFFICE O/P EST MOD 30 MIN: CPT | Performed by: FAMILY MEDICINE

## 2021-06-03 PROCEDURE — G8417 CALC BMI ABV UP PARAM F/U: HCPCS | Performed by: FAMILY MEDICINE

## 2021-06-03 PROCEDURE — 1036F TOBACCO NON-USER: CPT | Performed by: FAMILY MEDICINE

## 2021-06-03 PROCEDURE — 4040F PNEUMOC VAC/ADMIN/RCVD: CPT | Performed by: FAMILY MEDICINE

## 2021-06-03 PROCEDURE — 1123F ACP DISCUSS/DSCN MKR DOCD: CPT | Performed by: FAMILY MEDICINE

## 2021-06-03 ASSESSMENT — PATIENT HEALTH QUESTIONNAIRE - PHQ9
2. FEELING DOWN, DEPRESSED OR HOPELESS: 0
SUM OF ALL RESPONSES TO PHQ QUESTIONS 1-9: 0
1. LITTLE INTEREST OR PLEASURE IN DOING THINGS: 0
SUM OF ALL RESPONSES TO PHQ QUESTIONS 1-9: 0
SUM OF ALL RESPONSES TO PHQ QUESTIONS 1-9: 0
SUM OF ALL RESPONSES TO PHQ9 QUESTIONS 1 & 2: 0

## 2021-06-03 ASSESSMENT — ENCOUNTER SYMPTOMS
RESPIRATORY NEGATIVE: 1
CONSTIPATION: 1
EYES NEGATIVE: 1
ALLERGIC/IMMUNOLOGIC NEGATIVE: 1

## 2021-06-03 NOTE — PATIENT INSTRUCTIONS
Hospital Outpatient Visit on 05/24/2021   Component Date Value Ref Range Status    Cholesterol 05/24/2021 215* <200 mg/dL Final    Comment:    Cholesterol Guidelines:      <200  Desirable   200-240  Borderline      >240  Undesirable         HDL 05/24/2021 52  >40 mg/dL Final    Comment:    HDL Guidelines:    <40     Undesirable   40-59    Borderline    >59     Desirable         LDL Cholesterol 05/24/2021 138* 0 - 130 mg/dL Final    Comment:    LDL Guidelines:     <100    Desirable   100-129   Near to/above Desirable   130-159   Borderline      >159   Undesirable     Direct (measured) LDL and calculated LDL are not interchangeable tests.  Chol/HDL Ratio 05/24/2021 4.1  <5 Final            Triglycerides 05/24/2021 123  <150 mg/dL Final    Comment:    Triglyceride Guidelines:     <150   Desirable   150-199  Borderline   200-499  High     >499   Very high   Based on AHA Guidelines for fasting triglyceride, October 2012.          VLDL 05/24/2021 NOT REPORTED  1 - 30 mg/dL Final    Glucose 05/24/2021 109* 70 - 99 mg/dL Final    BUN 05/24/2021 18  8 - 23 mg/dL Final    CREATININE 05/24/2021 0.68  0.50 - 0.90 mg/dL Final    Bun/Cre Ratio 05/24/2021 26* 9 - 20 Final    Calcium 05/24/2021 9.2  8.6 - 10.4 mg/dL Final    Sodium 05/24/2021 142  135 - 144 mmol/L Final    Potassium 05/24/2021 4.6  3.7 - 5.3 mmol/L Final    Chloride 05/24/2021 105  98 - 107 mmol/L Final    CO2 05/24/2021 28  20 - 31 mmol/L Final    Anion Gap 05/24/2021 9  9 - 17 mmol/L Final    Alkaline Phosphatase 05/24/2021 77  35 - 104 U/L Final    ALT 05/24/2021 14  5 - 33 U/L Final    AST 05/24/2021 22  <32 U/L Final    Total Bilirubin 05/24/2021 0.43  0.3 - 1.2 mg/dL Final    Total Protein 05/24/2021 7.7  6.4 - 8.3 g/dL Final    Albumin 05/24/2021 4.3  3.5 - 5.2 g/dL Final    Albumin/Globulin Ratio 05/24/2021 1.3  1.0 - 2.5 Final    GFR Non- 05/24/2021 >60  >60 mL/min Final    GFR  05/24/2021 >60 >60 mL/min Final    GFR Comment 05/24/2021        Final    Comment: Average GFR for 79or more years old:   76 mL/min/1.73sq m  Chronic Kidney Disease:   <60 mL/min/1.73sq m  Kidney failure:   <15 mL/min/1.73sq m        The equation has not been validated in patients older than 79, but an MDRD-derived eGFR may   still be a useful tool for providers caring for patients older than 70. GFR is a calculated value that has proven clinically to  be a more effective measure of   kidney function when reported with serum creatinine.             GFR Staging 05/24/2021 NOT REPORTED   Final    WBC 05/24/2021 6.0  3.5 - 11.3 k/uL Final    RBC 05/24/2021 4.36  3.95 - 5.11 m/uL Final    Hemoglobin 05/24/2021 13.3  11.9 - 15.1 g/dL Final    Hematocrit 05/24/2021 41.0  36.3 - 47.1 % Final    MCV 05/24/2021 94.0  82.6 - 102.9 fL Final    MCH 05/24/2021 30.5  25.2 - 33.5 pg Final    MCHC 05/24/2021 32.4  25.2 - 33.5 g/dL Final    RDW 05/24/2021 13.2  11.8 - 14.4 % Final    Platelets 87/83/9219 215  138 - 453 k/uL Final    MPV 05/24/2021 9.6  8.1 - 13.5 fL Final    NRBC Automated 05/24/2021 0.0  0.0 per 100 WBC Final    Differential Type 05/24/2021 NOT REPORTED   Final    Seg Neutrophils 05/24/2021 58  36 - 65 % Final    Lymphocytes 05/24/2021 29  24 - 43 % Final    Monocytes 05/24/2021 9  3 - 12 % Final    Eosinophils % 05/24/2021 2  1 - 4 % Final    Basophils 05/24/2021 1  0 - 2 % Final    Immature Granulocytes 05/24/2021 1* 0 % Final    Segs Absolute 05/24/2021 3.54  1.50 - 8.10 k/uL Final    Absolute Lymph # 05/24/2021 1.76  1.10 - 3.70 k/uL Final    Absolute Mono # 05/24/2021 0.52  0.10 - 1.20 k/uL Final    Absolute Eos # 05/24/2021 0.13  0.00 - 0.44 k/uL Final    Basophils Absolute 05/24/2021 0.06  0.00 - 0.20 k/uL Final    Absolute Immature Granulocyte 05/24/2021 0.03  0.00 - 0.30 k/uL Final    WBC Morphology 05/24/2021 NOT REPORTED   Final    RBC Morphology 05/24/2021 NOT REPORTED   Final

## 2021-06-03 NOTE — TELEPHONE ENCOUNTER
Referral received from Dr. Luis F Villanueva for screening colonoscopy, no symptoms. Patient requested Dr. Mike Arellano, paperwork mailed.

## 2021-06-03 NOTE — PROGRESS NOTES
Subjective:      Patient ID: Power Morris is a 66 y.o. female. Hyperlipidemia    Hypertension       Routine follow up on chronic medical conditions, refills, and review of updated labs. I have reviewed the patient's medical history in detail and updated the computerized patient record. She is feeling fairly well at present. 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 5 sclerotherapy treatments. Some reconstruction surgery of tracheostomy closure and lower left lip. She has completed procedures and is now working with dentist.  She needs some bone addition to mandibular area by description. Planning bracing and other dental work after that. invisiline braces being used at present. She did have some bone graft under 6 of her mandibular incisors area by report. Has healed well. Allergies fairly mild at present. Intermittent rhinorrhea at present. No significant gerd symptoms. Non compliant with cpap. She thinks interventions have improved this condition. No palpitations of concern. No bladder issues. Using prunes to help with constipation. Working well for her. Increased gas/bloating/flatulence at present. She feels she has likely gained some wt. During covid restrictions. No covid concerns at present. Staying home for the most part. Past Medical History:   Diagnosis Date    Allergic rhinitis     GERD (gastroesophageal reflux disease)     Hemangioma of face     And arm.  Impaired fasting blood sugar     Internal hemorrhoids     Irritable bowel syndrome     Menometrorrhagia     Severe.     Mitral valve prolapse     Obstructive sleep apnea     non compliant with cpap    Osteoarthritis     Sigmoid diverticulosis     Supraventricular tachycardia [facility])      Past Surgical History:   Procedure Laterality Date    BREAST BIOPSY Right 11/18/1987    excisional biopsy    CATARACT REMOVAL WITH IMPLANT Left 11/08/2016    CATARACT REMOVAL WITH IMPLANT 12/06/2016    CHOLECYSTECTOMY  03/20/2000    COLONOSCOPY  06/12/2009    Showed sigmoid diverticulosis and internal hemorrhoids.  COLONOSCOPY  11/19/2014    diverticulosis, rectal polyp hyperplastic    GASTROSTOMY TUBE PLACEMENT  01/08/2018    HYSTERECTOMY  05/01/2000    MOUTH SURGERY      OTHER SURGICAL HISTORY      Scleratherapy-tongue, bottom lip and chin    OVARY REMOVAL Bilateral 05/2000    With leiomyoma noted.  TONSILLECTOMY      TRACHEOSTOMY  11/14/2017    UPPER GASTROINTESTINAL ENDOSCOPY  01/31/2014    multiple fundic gland polyps benign. hiatal hernia, hemangioma of the tongue and base of the tongue and hypopharnx     Current Outpatient Medications   Medication Sig Dispense Refill    Calcium Carbonate-Vit D-Min (CALTRATE PLUS PO) Take by mouth Chocolate chews      docusate sodium (COLACE) 100 MG capsule Take 100 mg by mouth as needed for Constipation       No current facility-administered medications for this visit. Allergies   Allergen Reactions    Ciprofloxacin      Does not set well with taste d/t g-tube placement    Penicillins      Childhood.  Sulfa Antibiotics     E-Mycin [Erythromycin] Nausea And Vomiting         Review of Systems   Constitutional: Negative. HENT: Negative. Eyes: Negative. Respiratory: Negative. Cardiovascular: Negative. Gastrointestinal: Positive for constipation. Endocrine: Negative. Genitourinary: Negative. Musculoskeletal: Negative. Skin: Negative. Allergic/Immunologic: Negative. Neurological: Negative. Hematological: Negative. Psychiatric/Behavioral: Negative. Objective:   Physical Exam  Constitutional:       General: She is not in acute distress. HENT:      Head: Normocephalic and atraumatic. Nose: Nose normal.   Eyes:      Pupils: Pupils are equal, round, and reactive to light. Cardiovascular:      Rate and Rhythm: Normal rate.    Pulmonary:      Effort: Pulmonary effort is normal. No respiratory  Albumin 05/24/2021 4.3  3.5 - 5.2 g/dL Final    Albumin/Globulin Ratio 05/24/2021 1.3  1.0 - 2.5 Final    GFR Non- 05/24/2021 >60  >60 mL/min Final    GFR  05/24/2021 >60  >60 mL/min Final    GFR Comment 05/24/2021        Final    GFR Staging 05/24/2021 NOT REPORTED   Final    WBC 05/24/2021 6.0  3.5 - 11.3 k/uL Final    RBC 05/24/2021 4.36  3.95 - 5.11 m/uL Final    Hemoglobin 05/24/2021 13.3  11.9 - 15.1 g/dL Final    Hematocrit 05/24/2021 41.0  36.3 - 47.1 % Final    MCV 05/24/2021 94.0  82.6 - 102.9 fL Final    MCH 05/24/2021 30.5  25.2 - 33.5 pg Final    MCHC 05/24/2021 32.4  25.2 - 33.5 g/dL Final    RDW 05/24/2021 13.2  11.8 - 14.4 % Final    Platelets 76/50/6374 215  138 - 453 k/uL Final    MPV 05/24/2021 9.6  8.1 - 13.5 fL Final    NRBC Automated 05/24/2021 0.0  0.0 per 100 WBC Final    Differential Type 05/24/2021 NOT REPORTED   Final    Seg Neutrophils 05/24/2021 58  36 - 65 % Final    Lymphocytes 05/24/2021 29  24 - 43 % Final    Monocytes 05/24/2021 9  3 - 12 % Final    Eosinophils % 05/24/2021 2  1 - 4 % Final    Basophils 05/24/2021 1  0 - 2 % Final    Immature Granulocytes 05/24/2021 1* 0 % Final    Segs Absolute 05/24/2021 3.54  1.50 - 8.10 k/uL Final    Absolute Lymph # 05/24/2021 1.76  1.10 - 3.70 k/uL Final    Absolute Mono # 05/24/2021 0.52  0.10 - 1.20 k/uL Final    Absolute Eos # 05/24/2021 0.13  0.00 - 0.44 k/uL Final    Basophils Absolute 05/24/2021 0.06  0.00 - 0.20 k/uL Final    Absolute Immature Granulocyte 05/24/2021 0.03  0.00 - 0.30 k/uL Final    WBC Morphology 05/24/2021 NOT REPORTED   Final    RBC Morphology 05/24/2021 NOT REPORTED   Final    Platelet Estimate 70/46/0418 NOT REPORTED   Final    Hemoglobin A1C 05/24/2021 6.1* 4.0 - 6.0 % Final    Estimated Avg Glucose 05/24/2021 128  mg/dL Final       Assessment:       Encounter Diagnoses   Name Primary?     Encounter for screening colonoscopy Yes    Supraventricular tachycardia ([facility] [address]     Essential hypertension     Pure hypercholesterolemia     Seasonal allergic rhinitis due to pollen     Impaired fasting blood sugar     Sigmoid diverticulosis     Colon cancer screening     Obstructive sleep apnea               Plan:       SVT: no concerns at present. Unaware of any recurrent episodes    Dizziness : very vague on review. chiropractor thought more bppv, I thought more orthostatic in the past. No concerning symptoms on review. Opting to observe. No significant issues over the interval.      Htn: no active treatment at present. Cont. Observation, salt avoidance, monitoring    Hyperlipidemia: variable numbers, ldl 126 to 182.  138 at present. Not interested in treatment at present. Allergies: mild symptoms at present. Has prn meds if needed. IFBS : mild/stable. Cont. Dietary discretion. a1c 6.1%. Diverticulosis; no symptoms long term. Fiber supplement recommended. Some constipation, doing better on colace , considering metamucil. Some increased gas and bloating at present. She plans to trial the beeno, plus mylicon  Or gas-X as needed. Considering trial of pro biotics. Colonoscopy up to date 2014. Mammogram in July. Low back pain. Mild/chronic. Not bothersome at present. New mattress in the last year. danielito: 2009 sleep study showing frequent danielito with desats. She did start cpap, but ultimately did not tolerate it. She felt too warm and pressure seemed too high by description. She tried different masks and ultimately stopped using. She had restarted cpap and seems to be tolerating it ok. She notes some perceived improvement in sleep/drowsiness issues now. Not compliant at present. Still with mask problems due to her anatomy. Denies drowsiness. She feels ablation helped this issue. Hemangioma of face and tongue. Some suggestion of progression with more fullness in the mouth and left cheek, more difficulty.

## 2021-07-07 ENCOUNTER — TELEPHONE (OUTPATIENT)
Dept: SURGERY | Age: 71
End: 2021-07-07

## 2021-07-07 NOTE — TELEPHONE ENCOUNTER
Spoke with patient at this time scheduled for a colonoscopy Thursday 7/22/21 with Dr. Sarah Barraza at [facility].   Surgery instructions and bowel prep went over with patient at this time, denies any questions. All instructions mailed at this time. Acknowledges understanding of procedure and will call with any further questions.

## 2021-07-21 NOTE — H&P
Mary Kate Villanueva is a 66 y.o. female who presents today for repeat screening colonoscopy. Pt had last colonoscopy in 2014 with polyps removed. Was recommended to have repeat in 5 years. Denies any recent changes in bowel movements, blood per rectum, melena or unintended weight loss. No reported family hx of colon cancer. Past Medical History:   Diagnosis Date    Allergic rhinitis     GERD (gastroesophageal reflux disease)     Hemangioma of face     And arm.  Impaired fasting blood sugar     Internal hemorrhoids     Irritable bowel syndrome     Menometrorrhagia     Severe.  Mitral valve prolapse     Obstructive sleep apnea     non compliant with cpap    Osteoarthritis     Sigmoid diverticulosis     Supraventricular tachycardia [facility])        Past Surgical History:   Procedure Laterality Date    BREAST BIOPSY Right 11/18/1987    excisional biopsy    CATARACT REMOVAL WITH IMPLANT Left 11/08/2016    CATARACT REMOVAL WITH IMPLANT  12/06/2016    CHOLECYSTECTOMY  03/20/2000    COLONOSCOPY  06/12/2009    Showed sigmoid diverticulosis and internal hemorrhoids.  COLONOSCOPY  11/19/2014    diverticulosis, rectal polyp hyperplastic    GASTROSTOMY TUBE PLACEMENT  01/08/2018    HYSTERECTOMY  05/01/2000    MOUTH SURGERY      OTHER SURGICAL HISTORY      Scleratherapy-tongue, bottom lip and chin    OVARY REMOVAL Bilateral 05/2000    With leiomyoma noted.  TONSILLECTOMY      TRACHEOSTOMY  11/14/2017    UPPER GASTROINTESTINAL ENDOSCOPY  01/31/2014    multiple fundic gland polyps benign. hiatal hernia, hemangioma of the tongue and base of the tongue and hypopharnx       No current facility-administered medications for this encounter.      Current Outpatient Medications   Medication Sig Dispense Refill    Calcium Carbonate-Vit D-Min (CALTRATE PLUS PO) Take by mouth Chocolate chews      docusate sodium (COLACE) 100 MG capsule Take 100 mg by mouth as needed for Constipation Allergies   Allergen Reactions    Ciprofloxacin      Does not set well with taste d/t g-tube placement    Penicillins      Childhood.  Sulfa Antibiotics     E-Mycin [Erythromycin] Nausea And Vomiting       Family History   Problem Relation Age of Onset   John Corona Pacemaker Mother     Heart Disease Mother     Glaucoma Mother     Diabetes Other        Social History     Socioeconomic History    Marital status: Single     Spouse name: Not on file    Number of children: Not on file    Years of education: Not on file    Highest education level: Not on file   Occupational History    Not on file   Tobacco Use    Smoking status: Never Smoker    Smokeless tobacco: Never Used   Substance and Sexual Activity    Alcohol use: No     Alcohol/week: 0.0 standard drinks    Drug use: No    Sexual activity: Not on file   Other Topics Concern    Not on file   Social History Narrative    Not on file     Social Determinants of Health     Financial Resource Strain:     Difficulty of Paying Living Expenses:    Food Insecurity:     Worried About Running Out of Food in the Last Year:     [address] in the Last Year:    Transportation Needs:     Lack of Transportation (Medical):  Lack of Transportation (Non-Medical):    Physical Activity:     Days of Exercise per Week:     Minutes of Exercise per Session:    Stress:     Feeling of Stress :    Social Connections:     Frequency of Communication with Friends and Family:     Frequency of Social Gatherings with Friends and Family:     Attends Zoroastrianism Services:     Active Member of Clubs or Organizations:     Attends Club or Organization Meetings:     Marital Status:    Intimate Partner Violence:     Fear of Current or Ex-Partner:     Emotionally Abused:     Physically Abused:     Sexually Abused:        ROS:   Review of Systems - Negative except as noted in HPI      Objective   There were no vitals filed for this visit.   General:in no apparent distress  Eyes: No gross abnormalities. Ears, Nose, Throat: hearing grossly normal bilaterally  Neck: neck supple and non tender without mass  Lungs: clear to auscultation without wheezes or rales   Heart: S1S2, no mumurs, RRR  Abdomen: soft, nontender, no HSM, no guarding, no rebound, no masses  Extremity: negative  Neuro: CN II-XII grossly intact      Assessment     1. Screening colonoscopy      Plan     1.  Proceed as planned    Electronically signed by Marco Antonio Barksdale DO on 7/21/2021 at 7:15 PM      (Please note that portions of this note were completed with a voice recognition program.  Efforts were made to edit the dictations but occasionally words are mis-transcribed.)

## 2021-07-22 ENCOUNTER — HOSPITAL ENCOUNTER (OUTPATIENT)
Age: 71
Setting detail: OUTPATIENT SURGERY
Discharge: HOME | End: 2021-07-22
Attending: SURGERY | Admitting: SURGERY
Payer: MEDICARE

## 2021-07-22 ENCOUNTER — ANESTHESIA (OUTPATIENT)
Dept: OPERATING ROOM | Age: 71
End: 2021-07-22
Payer: MEDICARE

## 2021-07-22 ENCOUNTER — ANESTHESIA EVENT (OUTPATIENT)
Dept: OPERATING ROOM | Age: 71
End: 2021-07-22
Payer: MEDICARE

## 2021-07-22 VITALS
RESPIRATION RATE: 16 BRPM | OXYGEN SATURATION: 99 % | SYSTOLIC BLOOD PRESSURE: 125 MMHG | TEMPERATURE: 97 F | HEART RATE: 68 BPM | BODY MASS INDEX: 24.7 KG/M2 | HEIGHT: 60 IN | DIASTOLIC BLOOD PRESSURE: 61 MMHG | WEIGHT: 125.8 LBS

## 2021-07-22 VITALS — OXYGEN SATURATION: 98 % | DIASTOLIC BLOOD PRESSURE: 69 MMHG | SYSTOLIC BLOOD PRESSURE: 133 MMHG

## 2021-07-22 PROCEDURE — 2580000003 HC RX 258: Performed by: SURGERY

## 2021-07-22 PROCEDURE — 3700000001 HC ADD 15 MINUTES (ANESTHESIA): Performed by: SURGERY

## 2021-07-22 PROCEDURE — 45384 COLONOSCOPY W/LESION REMOVAL: CPT | Performed by: SURGERY

## 2021-07-22 PROCEDURE — 88305 TISSUE EXAM BY PATHOLOGIST: CPT

## 2021-07-22 PROCEDURE — 3700000000 HC ANESTHESIA ATTENDED CARE: Performed by: SURGERY

## 2021-07-22 PROCEDURE — 6360000002 HC RX W HCPCS: Performed by: NURSE ANESTHETIST, CERTIFIED REGISTERED

## 2021-07-22 PROCEDURE — 7100000010 HC PHASE II RECOVERY - FIRST 15 MIN: Performed by: SURGERY

## 2021-07-22 PROCEDURE — 7100000011 HC PHASE II RECOVERY - ADDTL 15 MIN: Performed by: SURGERY

## 2021-07-22 PROCEDURE — 2709999900 HC NON-CHARGEABLE SUPPLY: Performed by: SURGERY

## 2021-07-22 PROCEDURE — 3609009300 HC COLONOSCOPY BIOPSY/STOMA: Performed by: SURGERY

## 2021-07-22 PROCEDURE — 2500000003 HC RX 250 WO HCPCS: Performed by: NURSE ANESTHETIST, CERTIFIED REGISTERED

## 2021-07-22 RX ORDER — SODIUM CHLORIDE 0.9 % (FLUSH) 0.9 %
10 SYRINGE (ML) INJECTION EVERY 12 HOURS SCHEDULED
Status: DISCONTINUED | OUTPATIENT
Start: 2021-07-22 | End: 2021-07-22 | Stop reason: HOSPADM

## 2021-07-22 RX ORDER — SODIUM CHLORIDE, SODIUM LACTATE, POTASSIUM CHLORIDE, CALCIUM CHLORIDE 600; 310; 30; 20 MG/100ML; MG/100ML; MG/100ML; MG/100ML
INJECTION, SOLUTION INTRAVENOUS CONTINUOUS
Status: DISCONTINUED | OUTPATIENT
Start: 2021-07-22 | End: 2021-07-22 | Stop reason: HOSPADM

## 2021-07-22 RX ORDER — PROPOFOL 10 MG/ML
INJECTION, EMULSION INTRAVENOUS PRN
Status: DISCONTINUED | OUTPATIENT
Start: 2021-07-22 | End: 2021-07-22 | Stop reason: SDUPTHER

## 2021-07-22 RX ORDER — LIDOCAINE HYDROCHLORIDE 40 MG/ML
INJECTION, SOLUTION RETROBULBAR; TOPICAL PRN
Status: DISCONTINUED | OUTPATIENT
Start: 2021-07-22 | End: 2021-07-22 | Stop reason: SDUPTHER

## 2021-07-22 RX ORDER — SODIUM CHLORIDE 9 MG/ML
25 INJECTION, SOLUTION INTRAVENOUS PRN
Status: DISCONTINUED | OUTPATIENT
Start: 2021-07-22 | End: 2021-07-22 | Stop reason: HOSPADM

## 2021-07-22 RX ORDER — SODIUM CHLORIDE 0.9 % (FLUSH) 0.9 %
10 SYRINGE (ML) INJECTION PRN
Status: DISCONTINUED | OUTPATIENT
Start: 2021-07-22 | End: 2021-07-22 | Stop reason: HOSPADM

## 2021-07-22 RX ADMIN — LIDOCAINE HYDROCHLORIDE 120 MG: 40 INJECTION, SOLUTION RETROBULBAR; TOPICAL at 10:45

## 2021-07-22 RX ADMIN — PROPOFOL 300 MG: 10 INJECTION, EMULSION INTRAVENOUS at 10:45

## 2021-07-22 RX ADMIN — SODIUM CHLORIDE, POTASSIUM CHLORIDE, SODIUM LACTATE AND CALCIUM CHLORIDE: 600; 310; 30; 20 INJECTION, SOLUTION INTRAVENOUS at 10:10

## 2021-07-22 ASSESSMENT — PAIN SCALES - GENERAL
PAINLEVEL_OUTOF10: 0

## 2021-07-22 ASSESSMENT — PAIN - FUNCTIONAL ASSESSMENT: PAIN_FUNCTIONAL_ASSESSMENT: 0-10

## 2021-07-22 NOTE — ANESTHESIA POSTPROCEDURE EVALUATION
Department of Anesthesiology  Postprocedure Note    Patient: Clint Stevenson  MRN: [id number]  YOB: 1950  Date of evaluation: 7/22/2021  Time:  11:44 AM     Procedure Summary     Date: 07/22/21 Room / Location: [address]    Anesthesia Start: 1041 Anesthesia Stop:     Procedure: COLONOSCOPY HOT BIOPSY (N/A ) Diagnosis: (history colon polyps)    Surgeons: Feng Wolf DO Responsible Provider: MARIS Pickett CRNA    Anesthesia Type: general, TIVA ASA Status: 2          Anesthesia Type: No value filed. Bee Phase I: Bee Score: 10    Bee Phase II: Bee Score: 9    Last vitals: Reviewed and per EMR flowsheets.        Anesthesia Post Evaluation    Patient location during evaluation: PACU  Patient participation: complete - patient participated  Level of consciousness: awake and alert  Pain score: 0  Airway patency: patent  Nausea & Vomiting: no nausea and no vomiting  Complications: no  Cardiovascular status: blood pressure returned to baseline and hemodynamically stable  Respiratory status: acceptable  Hydration status: euvolemic

## 2021-07-22 NOTE — OP NOTE
[address]                 [address] [place] [address]                                OPERATIVE REPORT    PATIENT NAME: Salome Hamm                    :        1950  MED REC NO:   [id number]                             ROOM:  ACCOUNT NO:   [de-identified]                           ADMIT DATE: 2021  PROVIDER:     Ida Jasso    DATE OF PROCEDURE:  2021    SURGEON:  Dr. Ida Jasso. ASSISTANT:  None. PREOPERATIVE DIAGNOSES:  1.  Screening. 2.  History of polyps. POSTOPERATIVE DIAGNOSES:  1.  Screening. 2.  Colon polyps. 3.  Mild diverticulosis of the sigmoid colon. PROCEDURE:  Colonoscopy with hot forceps polypectomies. ANESTHESIA:  MAC.    ESTIMATED BLOOD LOSS:  Minimal.    FLUIDS:  Per anesthesia record. COMPLICATIONS:  None. SPECIMENS:  1. Polyp at 60 cm removed with hot forceps. 2.  Polyp at 20 cm removed with hot forceps. INDICATIONS FOR PROCEDURE:  The patient is a 77-year-old female who is  referred to my office with the above diagnosis. After evaluation,  decision was made to proceed with colonoscopy. Prior to the time of the  procedure, risks, benefits, and alternatives were explained and consent  was obtained. DESCRIPTION OF PROCEDURE:  The patient was brought to endoscopy suite,  kept on preoperative gurney and placed in the left lateral decubitus  position. Monitoring devices were placed. MAC anesthesia was induced. After induction of anesthesia, time-out was performed and correct  patient and procedure were verified. Digital rectal exam was performed  which showed no abnormalities. The Olympus video endoscope was  lubricated, inserted into the patient's rectum which was gently  insufflated with air. Scope was then slowly advanced through colon  under visualization to the level of the cecum which was identified by  appendiceal orifice and ileocecal valve.   During advancement of the  scope, bowel prep was noted to be adequate. Scope was then slowly  withdrawn with withdrawal time being greater than 7 minutes. During  this time, colonic mucosa was carefully inspected. Polyps were  encountered as documented above and were removed with bites of hot  forceps. Additionally, in the sigmoid colon, there was some scattered  diverticula with no evidence of inflammation. Once at the rectum, a  retroflex view was obtained which showed no significant distal rectal  abnormalities. Scope was then straightened and removed and procedure  was concluded. At conclusion of the procedure, the patient was in  stable condition and was taken to the PACU for recovery. PLAN:  I will follow up the results of pathology and we will make  additional recommendations based on those results.         Bobby Mock    D: 07/22/2021 11:08:17       T: 07/22/2021 11:16:19     [email or web address]  Job#: [id number]     Doc#: [id number]    CC:  Primary Care

## 2021-07-22 NOTE — ANESTHESIA PRE PROCEDURE
Department of Anesthesiology  Preprocedure Note       Name:  Kristal Trimble   Age:  66 y.o.  :  1950                                          MRN:  [id number]         Date:  2021      Surgeon: Yoli Silva):  Baljinder James DO    Procedure: Procedure(s):  v-Colonoscopy    Medications prior to admission:   Prior to Admission medications    Medication Sig Start Date End Date Taking? Authorizing Provider   Calcium Carbonate-Vit D-Min (CALTRATE PLUS PO) Take by mouth Chocolate chews    Historical Provider, MD   docusate sodium (COLACE) 100 MG capsule Take 100 mg by mouth as needed for Constipation    Historical Provider, MD       Current medications:    Current Facility-Administered Medications   Medication Dose Route Frequency Provider Last Rate Last Admin    lactated ringers infusion   Intravenous Continuous Baljinder Erika,         sodium chloride flush 0.9 % injection 10 mL  10 mL Intravenous 2 times per day Baljinder Erika,         sodium chloride flush 0.9 % injection 10 mL  10 mL Intravenous PRN Baljinder Erika, DO        0.9 % sodium chloride infusion  25 mL Intravenous PRN Baljinder James,            Allergies: Allergies   Allergen Reactions    Sulfa Antibiotics Swelling    Ciprofloxacin      Does not set well with taste d/t g-tube placement    E-Mycin [Erythromycin] Nausea And Vomiting    Penicillins      Childhood.        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  Impaired fasting blood sugar     Internal hemorrhoids     Irritable bowel syndrome     Menometrorrhagia     Severe.  Mitral valve prolapse     Obstructive sleep apnea     non compliant with cpap    Osteoarthritis     Sigmoid diverticulosis     Supraventricular tachycardia [facility])        Past Surgical History:        Procedure Laterality Date    BREAST BIOPSY Right 11/18/1987    excisional biopsy    CATARACT REMOVAL WITH IMPLANT Left 11/08/2016    CATARACT REMOVAL WITH IMPLANT  12/06/2016    CHOLECYSTECTOMY  03/20/2000    COLONOSCOPY  06/12/2009    Showed sigmoid diverticulosis and internal hemorrhoids.  COLONOSCOPY  11/19/2014    diverticulosis, rectal polyp hyperplastic    GASTROSTOMY TUBE PLACEMENT  01/08/2018    HYSTERECTOMY  05/01/2000    MOUTH SURGERY      OTHER SURGICAL HISTORY      Scleratherapy-tongue, bottom lip and chin    OVARY REMOVAL Bilateral 05/2000    With leiomyoma noted.  TONSILLECTOMY      TRACHEOSTOMY  11/14/2017    UPPER GASTROINTESTINAL ENDOSCOPY  01/31/2014    multiple fundic gland polyps benign. hiatal hernia, hemangioma of the tongue and base of the tongue and hypopharnx       Social History:    Social History     Tobacco Use    Smoking status: Never Smoker    Smokeless tobacco: Never Used   Substance Use Topics    Alcohol use: No     Alcohol/week: 0.0 standard drinks                                Counseling given: Not Answered      Vital Signs (Current): There were no vitals filed for this visit.                                            BP Readings from Last 3 Encounters:   06/03/21 118/64   03/04/21 122/72   06/19/20 116/64       NPO Status: Time of last liquid consumption: 2100                        Time of last solid consumption: 2130                        Date of last liquid consumption: 07/21/21                        Date of last solid food consumption: 07/20/21    BMI:   Wt Readings from Last 3 Encounters: 06/03/21 131 lb (59.4 kg)   03/04/21 130 lb (59 kg)   06/19/20 121 lb 14.6 oz (55.3 kg)     There is no height or weight on file to calculate BMI.    CBC:   Lab Results   Component Value Date    WBC 6.0 05/24/2021    RBC 4.36 05/24/2021    HGB 13.3 05/24/2021    HCT 41.0 05/24/2021    MCV 94.0 05/24/2021    RDW 13.2 05/24/2021     05/24/2021       CMP:   Lab Results   Component Value Date     05/24/2021    K 4.6 05/24/2021     05/24/2021    CO2 28 05/24/2021    BUN 18 05/24/2021    CREATININE 0.68 05/24/2021    GFRAA >60 05/24/2021    LABGLOM >60 05/24/2021    GLUCOSE 109 05/24/2021    PROT 7.7 05/24/2021    CALCIUM 9.2 05/24/2021    BILITOT 0.43 05/24/2021    ALKPHOS 77 05/24/2021    AST 22 05/24/2021    ALT 14 05/24/2021       POC Tests: No results for input(s): POCGLU, POCNA, POCK, POCCL, POCBUN, POCHEMO, POCHCT in the last 72 hours. Coags:   Lab Results   Component Value Date    PROTIME 10.6 12/07/2018    INR 1.0 12/07/2018       HCG (If Applicable): No results found for: PREGTESTUR, PREGSERUM, HCG, HCGQUANT     ABGs: No results found for: PHART, PO2ART, LAR6RGF, TMJ4KRG, BEART, O6ATBAUT     Type & Screen (If Applicable):  No results found for: LABABO, LABRH    Drug/Infectious Status (If Applicable):  No results found for: HIV, HEPCAB    COVID-19 Screening (If Applicable): No results found for: COVID19        Anesthesia Evaluation  Patient summary reviewed and Nursing notes reviewed  Airway: Mallampati: III  TM distance: >3 FB   Neck ROM: full  Mouth opening: > = 3 FB Dental: normal exam         Pulmonary:normal exam    (+) sleep apnea: on CPAP,                             Cardiovascular:    (+) hypertension:,                   Neuro/Psych:               GI/Hepatic/Renal:   (+) GERD:,           Endo/Other:    (+) DiabetesType II DM, , .                 Abdominal:             Vascular:           Other Findings:             Anesthesia Plan      general and TIVA     ASA 2       Induction: intravenous. Anesthetic plan and risks discussed with patient.       Plan discussed with surgical team.                  MARIS Orellana - KIN   7/22/2021

## 2021-07-26 LAB — SURGICAL PATHOLOGY REPORT: NORMAL

## 2021-09-15 ENCOUNTER — IMMUNIZATION (OUTPATIENT)
Dept: LAB | Age: 71
End: 2021-09-15
Payer: MEDICARE

## 2021-09-15 PROCEDURE — G0008 ADMIN INFLUENZA VIRUS VAC: HCPCS

## 2021-11-19 ENCOUNTER — IMMUNIZATION (OUTPATIENT)
Dept: LAB | Age: 71
End: 2021-11-19
Payer: MEDICARE

## 2021-11-19 PROCEDURE — 91306 COVID-19, MODERNA BOOSTER VACCINE 0.25ML DOSE: CPT | Performed by: INTERNAL MEDICINE

## 2021-11-19 PROCEDURE — PBSHW COVID-19, MODERNA BOOSTER VACCINE 0.25ML DOSE: Performed by: INTERNAL MEDICINE

## 2021-11-19 PROCEDURE — 0064A: CPT

## 2021-12-01 ENCOUNTER — HOSPITAL ENCOUNTER (OUTPATIENT)
Dept: LAB | Age: 71
Discharge: HOME | End: 2021-12-01
Payer: MEDICARE

## 2021-12-01 DIAGNOSIS — R73.01 IMPAIRED FASTING BLOOD SUGAR: ICD-10-CM

## 2021-12-01 DIAGNOSIS — E78.00 PURE HYPERCHOLESTEROLEMIA: ICD-10-CM

## 2021-12-01 DIAGNOSIS — I10 ESSENTIAL HYPERTENSION: ICD-10-CM

## 2021-12-01 LAB
ABSOLUTE EOS #: 0.29 K/UL (ref 0–0.44)
ABSOLUTE IMMATURE GRANULOCYTE: 0.06 K/UL (ref 0–0.3)
ABSOLUTE LYMPH #: 3.01 K/UL (ref 1.1–3.7)
ABSOLUTE MONO #: 0.62 K/UL (ref 0.1–1.2)
ALBUMIN SERPL-MCNC: 4.5 G/DL (ref 3.5–5.2)
ALBUMIN/GLOBULIN RATIO: 1.3 (ref 1–2.5)
ALP BLD-CCNC: 75 U/L (ref 35–104)
ALT SERPL-CCNC: 27 U/L (ref 5–33)
ANION GAP SERPL CALCULATED.3IONS-SCNC: 14 MMOL/L (ref 9–17)
AST SERPL-CCNC: 18 U/L
BASOPHILS # BLD: 1 % (ref 0–2)
BASOPHILS ABSOLUTE: 0.09 K/UL (ref 0–0.2)
BILIRUB SERPL-MCNC: 0.5 MG/DL (ref 0.3–1.2)
BUN BLDV-MCNC: 17 MG/DL (ref 8–23)
BUN/CREAT BLD: 26 (ref 9–20)
CALCIUM SERPL-MCNC: 9.7 MG/DL (ref 8.6–10.4)
CHLORIDE BLD-SCNC: 103 MMOL/L (ref 98–107)
CHOLESTEROL/HDL RATIO: 4.4
CHOLESTEROL: 217 MG/DL
CO2: 27 MMOL/L (ref 20–31)
CREAT SERPL-MCNC: 0.65 MG/DL (ref 0.5–0.9)
DIFFERENTIAL TYPE: ABNORMAL
EOSINOPHILS RELATIVE PERCENT: 4 % (ref 1–4)
ESTIMATED AVERAGE GLUCOSE: 128 MG/DL
GFR AFRICAN AMERICAN: >60 ML/MIN
GFR NON-AFRICAN AMERICAN: >60 ML/MIN
GFR SERPL CREATININE-BSD FRML MDRD: ABNORMAL ML/MIN/{1.73_M2}
GFR SERPL CREATININE-BSD FRML MDRD: ABNORMAL ML/MIN/{1.73_M2}
GLUCOSE BLD-MCNC: 109 MG/DL (ref 70–99)
HBA1C MFR BLD: 6.1 % (ref 4–6)
HCT VFR BLD CALC: 41.7 % (ref 36.3–47.1)
HDLC SERPL-MCNC: 49 MG/DL
HEMOGLOBIN: 13.5 G/DL (ref 11.9–15.1)
IMMATURE GRANULOCYTES: 1 %
LDL CHOLESTEROL: 137 MG/DL (ref 0–130)
LYMPHOCYTES # BLD: 39 % (ref 24–43)
MCH RBC QN AUTO: 30.3 PG (ref 25.2–33.5)
MCHC RBC AUTO-ENTMCNC: 32.4 G/DL (ref 25.2–33.5)
MCV RBC AUTO: 93.7 FL (ref 82.6–102.9)
MONOCYTES # BLD: 8 % (ref 3–12)
NRBC AUTOMATED: 0 PER 100 WBC
PDW BLD-RTO: 13.2 % (ref 11.8–14.4)
PLATELET # BLD: 324 K/UL (ref 138–453)
PLATELET ESTIMATE: ABNORMAL
PMV BLD AUTO: 9.4 FL (ref 8.1–13.5)
POTASSIUM SERPL-SCNC: 4.5 MMOL/L (ref 3.7–5.3)
RBC # BLD: 4.45 M/UL (ref 3.95–5.11)
RBC # BLD: ABNORMAL 10*6/UL
SEG NEUTROPHILS: 47 % (ref 36–65)
SEGMENTED NEUTROPHILS ABSOLUTE COUNT: 3.65 K/UL (ref 1.5–8.1)
SODIUM BLD-SCNC: 144 MMOL/L (ref 135–144)
TOTAL PROTEIN: 8.1 G/DL (ref 6.4–8.3)
TRIGL SERPL-MCNC: 157 MG/DL
VLDLC SERPL CALC-MCNC: ABNORMAL MG/DL (ref 1–30)
WBC # BLD: 7.7 K/UL (ref 3.5–11.3)
WBC # BLD: ABNORMAL 10*3/UL

## 2021-12-01 PROCEDURE — 85025 COMPLETE CBC W/AUTO DIFF WBC: CPT

## 2021-12-01 PROCEDURE — 80061 LIPID PANEL: CPT

## 2021-12-01 PROCEDURE — 83036 HEMOGLOBIN GLYCOSYLATED A1C: CPT

## 2021-12-01 PROCEDURE — 80053 COMPREHEN METABOLIC PANEL: CPT

## 2021-12-01 PROCEDURE — 36415 COLL VENOUS BLD VENIPUNCTURE: CPT

## 2021-12-09 ENCOUNTER — OFFICE VISIT (OUTPATIENT)
Dept: FAMILY MEDICINE CLINIC | Age: 71
End: 2021-12-09
Payer: MEDICARE

## 2021-12-09 VITALS
DIASTOLIC BLOOD PRESSURE: 72 MMHG | HEART RATE: 68 BPM | HEIGHT: 60 IN | SYSTOLIC BLOOD PRESSURE: 124 MMHG | BODY MASS INDEX: 24.74 KG/M2 | WEIGHT: 126 LBS

## 2021-12-09 DIAGNOSIS — R73.01 IMPAIRED FASTING BLOOD SUGAR: ICD-10-CM

## 2021-12-09 DIAGNOSIS — I47.1 SUPRAVENTRICULAR TACHYCARDIA (HCC): Primary | ICD-10-CM

## 2021-12-09 DIAGNOSIS — I10 ESSENTIAL HYPERTENSION: ICD-10-CM

## 2021-12-09 DIAGNOSIS — E78.00 PURE HYPERCHOLESTEROLEMIA: ICD-10-CM

## 2021-12-09 DIAGNOSIS — G47.33 OBSTRUCTIVE SLEEP APNEA: ICD-10-CM

## 2021-12-09 DIAGNOSIS — J30.1 SEASONAL ALLERGIC RHINITIS DUE TO POLLEN: ICD-10-CM

## 2021-12-09 DIAGNOSIS — K57.30 SIGMOID DIVERTICULOSIS: ICD-10-CM

## 2021-12-09 DIAGNOSIS — Q27.39 ARTERIOVENOUS MALFORMATION, OTHER SITE: ICD-10-CM

## 2021-12-09 DIAGNOSIS — Z12.11 COLON CANCER SCREENING: ICD-10-CM

## 2021-12-09 PROCEDURE — 3017F COLORECTAL CA SCREEN DOC REV: CPT | Performed by: FAMILY MEDICINE

## 2021-12-09 PROCEDURE — G8420 CALC BMI NORM PARAMETERS: HCPCS | Performed by: FAMILY MEDICINE

## 2021-12-09 PROCEDURE — G8427 DOCREV CUR MEDS BY ELIG CLIN: HCPCS | Performed by: FAMILY MEDICINE

## 2021-12-09 PROCEDURE — 1036F TOBACCO NON-USER: CPT | Performed by: FAMILY MEDICINE

## 2021-12-09 PROCEDURE — G8484 FLU IMMUNIZE NO ADMIN: HCPCS | Performed by: FAMILY MEDICINE

## 2021-12-09 PROCEDURE — 4040F PNEUMOC VAC/ADMIN/RCVD: CPT | Performed by: FAMILY MEDICINE

## 2021-12-09 PROCEDURE — 1090F PRES/ABSN URINE INCON ASSESS: CPT | Performed by: FAMILY MEDICINE

## 2021-12-09 PROCEDURE — 99214 OFFICE O/P EST MOD 30 MIN: CPT | Performed by: FAMILY MEDICINE

## 2021-12-09 PROCEDURE — 1123F ACP DISCUSS/DSCN MKR DOCD: CPT | Performed by: FAMILY MEDICINE

## 2021-12-09 PROCEDURE — G8399 PT W/DXA RESULTS DOCUMENT: HCPCS | Performed by: FAMILY MEDICINE

## 2021-12-09 PROCEDURE — 99213 OFFICE O/P EST LOW 20 MIN: CPT

## 2021-12-09 ASSESSMENT — ENCOUNTER SYMPTOMS
RESPIRATORY NEGATIVE: 1
CONSTIPATION: 1
ALLERGIC/IMMUNOLOGIC NEGATIVE: 1
EYES NEGATIVE: 1

## 2021-12-09 ASSESSMENT — PATIENT HEALTH QUESTIONNAIRE - PHQ9
1. LITTLE INTEREST OR PLEASURE IN DOING THINGS: 0
SUM OF ALL RESPONSES TO PHQ9 QUESTIONS 1 & 2: 0
SUM OF ALL RESPONSES TO PHQ QUESTIONS 1-9: 0
2. FEELING DOWN, DEPRESSED OR HOPELESS: 0

## 2021-12-09 NOTE — PATIENT INSTRUCTIONS
Hospital Outpatient Visit on 12/01/2021   Component Date Value Ref Range Status    Hemoglobin A1C 12/01/2021 6.1* 4.0 - 6.0 % Final    Estimated Avg Glucose 12/01/2021 128  mg/dL Final    Comment: The ADA and AACC recommend providing the estimated average glucose result to permit better   patient understanding of their HBA1c result.       WBC 12/01/2021 7.7  3.5 - 11.3 k/uL Final    RBC 12/01/2021 4.45  3.95 - 5.11 m/uL Final    Hemoglobin 12/01/2021 13.5  11.9 - 15.1 g/dL Final    Hematocrit 12/01/2021 41.7  36.3 - 47.1 % Final    MCV 12/01/2021 93.7  82.6 - 102.9 fL Final    MCH 12/01/2021 30.3  25.2 - 33.5 pg Final    MCHC 12/01/2021 32.4  25.2 - 33.5 g/dL Final    RDW 12/01/2021 13.2  11.8 - 14.4 % Final    Platelets 73/33/4860 324  138 - 453 k/uL Final    MPV 12/01/2021 9.4  8.1 - 13.5 fL Final    NRBC Automated 12/01/2021 0.0  0.0 per 100 WBC Final    Differential Type 12/01/2021 NOT REPORTED   Final    Seg Neutrophils 12/01/2021 47  36 - 65 % Final    Lymphocytes 12/01/2021 39  24 - 43 % Final    Monocytes 12/01/2021 8  3 - 12 % Final    Eosinophils % 12/01/2021 4  1 - 4 % Final    Basophils 12/01/2021 1  0 - 2 % Final    Immature Granulocytes 12/01/2021 1* 0 % Final    Segs Absolute 12/01/2021 3.65  1.50 - 8.10 k/uL Final    Absolute Lymph # 12/01/2021 3.01  1.10 - 3.70 k/uL Final    Absolute Mono # 12/01/2021 0.62  0.10 - 1.20 k/uL Final    Absolute Eos # 12/01/2021 0.29  0.00 - 0.44 k/uL Final    Basophils Absolute 12/01/2021 0.09  0.00 - 0.20 k/uL Final    Absolute Immature Granulocyte 12/01/2021 0.06  0.00 - 0.30 k/uL Final    WBC Morphology 12/01/2021 NOT REPORTED   Final    RBC Morphology 12/01/2021 NOT REPORTED   Final    Platelet Estimate 83/98/1895 NOT REPORTED   Final    Glucose 12/01/2021 109* 70 - 99 mg/dL Final    BUN 12/01/2021 17  8 - 23 mg/dL Final    CREATININE 12/01/2021 0.65  0.50 - 0.90 mg/dL Final    Bun/Cre Ratio 12/01/2021 26* 9 - 20 Final    Calcium 12/01/2021 9.7  8.6 - 10.4 mg/dL Final    Sodium 12/01/2021 144  135 - 144 mmol/L Final    Potassium 12/01/2021 4.5  3.7 - 5.3 mmol/L Final    Chloride 12/01/2021 103  98 - 107 mmol/L Final    CO2 12/01/2021 27  20 - 31 mmol/L Final    Anion Gap 12/01/2021 14  9 - 17 mmol/L Final    Alkaline Phosphatase 12/01/2021 75  35 - 104 U/L Final    ALT 12/01/2021 27  5 - 33 U/L Final    AST 12/01/2021 18  <32 U/L Final    Total Bilirubin 12/01/2021 0.50  0.3 - 1.2 mg/dL Final    Total Protein 12/01/2021 8.1  6.4 - 8.3 g/dL Final    Albumin 12/01/2021 4.5  3.5 - 5.2 g/dL Final    Albumin/Globulin Ratio 12/01/2021 1.3  1.0 - 2.5 Final    GFR Non- 12/01/2021 >60  >60 mL/min Final    GFR  12/01/2021 >60  >60 mL/min Final    GFR Comment 12/01/2021        Final    Comment: Average GFR for 79or more years old:   76 mL/min/1.73sq m  Chronic Kidney Disease:   <60 mL/min/1.73sq m  Kidney failure:   <15 mL/min/1.73sq m              eGFR calculated using average adult body mass. Additional eGFR calculator available at:        [email or web address]            GFR Staging 12/01/2021 NOT REPORTED   Final    Cholesterol 12/01/2021 217* <200 mg/dL Final    Comment:    Cholesterol Guidelines:      <200  Desirable   200-240  Borderline      >240  Undesirable         HDL 12/01/2021 49  >40 mg/dL Final    Comment:    HDL Guidelines:    <40     Undesirable   40-59    Borderline    >59     Desirable         LDL Cholesterol 12/01/2021 137* 0 - 130 mg/dL Final    Comment:    LDL Guidelines:     <100    Desirable   100-129   Near to/above Desirable   130-159   Borderline      >159   Undesirable     Direct (measured) LDL and calculated LDL are not interchangeable tests.       Chol/HDL Ratio 12/01/2021 4.4  <5 Final            Triglycerides 12/01/2021 157* <150 mg/dL Final    Comment:    Triglyceride Guidelines:     <150   Desirable   150-199  Borderline   200-499  High >499   Very high   Based on AHA Guidelines for fasting triglyceride, October 2012.          VLDL 12/01/2021 NOT REPORTED* 1 - 30 mg/dL Final

## 2021-12-09 NOTE — PROGRESS NOTES
Subjective:      Patient ID: Delfino Lam is a 66 y.o. female. Hyperlipidemia    Hypertension       Routine follow up on chronic medical conditions, refills, and review of updated labs. I have reviewed the patient's medical history in detail and updated the computerized patient record. She is feeling fairly well at present. 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 5 sclerotherapy treatments. Some reconstruction surgery of tracheostomy closure and lower left lip. She has completed procedures and is now working with dentist.  She needs some bone addition to mandibular area by description. Planning bracing and other dental work after that. invisiline braces being used at present. Night retainers in the future. She did have some bone graft under 6 of her mandibular incisors area by report. Has healed well. Considering a wire on the inside to better align them by description. Allergies fairly mild at present. Intermittent rhinorrhea at present. No significant gerd symptoms. Non compliant with cpap. She thinks interventions have improved this condition. No palpitations of concern. No bladder issues. Using prunes to help with constipation. Working well for her. Increased gas/bloating/flatulence at present. She feels she has likely gained some wt. During covid restrictions. No covid concerns at present. Staying home for the most part. Past Medical History:   Diagnosis Date    Allergic rhinitis     GERD (gastroesophageal reflux disease)     Hemangioma of face     And arm.  Impaired fasting blood sugar     Internal hemorrhoids     Irritable bowel syndrome     Menometrorrhagia     Severe.     Mitral valve prolapse     Obstructive sleep apnea     non compliant with cpap    Osteoarthritis     Sigmoid diverticulosis     Supraventricular tachycardia [facility])      Past Surgical History:   Procedure Laterality Date    BREAST BIOPSY Right 11/18/1987 excisional biopsy    CATARACT REMOVAL WITH IMPLANT Left 11/08/2016    CATARACT REMOVAL WITH IMPLANT  12/06/2016    CHOLECYSTECTOMY  03/20/2000    COLONOSCOPY  06/12/2009    Showed sigmoid diverticulosis and internal hemorrhoids.  COLONOSCOPY  11/19/2014    diverticulosis, rectal polyp hyperplastic    COLONOSCOPY N/A 7/22/2021    COLONOSCOPY HOT BIOPSY performed by Cristel Grover DO at [address] [place] [address]  01/08/2018    HYSTERECTOMY  05/01/2000    MOUTH SURGERY      OTHER SURGICAL HISTORY      Scleratherapy-tongue, bottom lip and chin    OVARY REMOVAL Bilateral 05/2000    With leiomyoma noted.  TONSILLECTOMY      TRACHEOSTOMY  11/14/2017    UPPER GASTROINTESTINAL ENDOSCOPY  01/31/2014    multiple fundic gland polyps benign. hiatal hernia, hemangioma of the tongue and base of the tongue and hypopharnx     Current Outpatient Medications   Medication Sig Dispense Refill    psyllium (KONSYL) 28.3 % PACK Take by mouth daily 1 teaspoon daily      Calcium Carbonate-Vit D-Min (CALTRATE PLUS PO) Take by mouth Chocolate chews      docusate sodium (COLACE) 100 MG capsule Take 100 mg by mouth as needed for Constipation       No current facility-administered medications for this visit. Allergies   Allergen Reactions    Sulfa Antibiotics Swelling    Ciprofloxacin      Does not set well with taste d/t g-tube placement    E-Mycin [Erythromycin] Nausea And Vomiting    Penicillins      Childhood. Review of Systems   Constitutional: Negative. HENT: Negative. Eyes: Negative. Respiratory: Negative. Cardiovascular: Negative. Gastrointestinal: Positive for constipation. Endocrine: Negative. Genitourinary: Negative. Musculoskeletal: Negative. Skin: Negative. Allergic/Immunologic: Negative. Neurological: Negative. Hematological: Negative. Psychiatric/Behavioral: Negative.         Objective:   Physical Exam  Constitutional:       General: She is not in acute distress. HENT:      Head: Normocephalic and atraumatic. Nose: Nose normal.   Eyes:      Pupils: Pupils are equal, round, and reactive to light. Cardiovascular:      Rate and Rhythm: Normal rate. Pulmonary:      Effort: Pulmonary effort is normal. No respiratory distress. Abdominal:      General: There is no distension. Musculoskeletal:         General: Normal range of motion. Skin:     General: Skin is warm. Findings: No rash. Neurological:      Mental Status: She is alert. Mental status is at baseline. Psychiatric:         Mood and Affect: Mood normal.         Behavior: Behavior normal.         Thought Content:  Thought content normal.         Judgment: Judgment normal.       /72 (Site: Right Upper Arm, Position: Sitting, Cuff Size: Large Adult)   Pulse 68   Ht 5' (1.524 m)   Wt 126 lb (57.2 kg)   LMP 05/06/2000   BMI 24.61 kg/m²     Hospital Outpatient Visit on 12/01/2021   Component Date Value Ref Range Status    Hemoglobin A1C 12/01/2021 6.1* 4.0 - 6.0 % Final    Estimated Avg Glucose 12/01/2021 128  mg/dL Final    WBC 12/01/2021 7.7  3.5 - 11.3 k/uL Final    RBC 12/01/2021 4.45  3.95 - 5.11 m/uL Final    Hemoglobin 12/01/2021 13.5  11.9 - 15.1 g/dL Final    Hematocrit 12/01/2021 41.7  36.3 - 47.1 % Final    MCV 12/01/2021 93.7  82.6 - 102.9 fL Final    MCH 12/01/2021 30.3  25.2 - 33.5 pg Final    MCHC 12/01/2021 32.4  25.2 - 33.5 g/dL Final    RDW 12/01/2021 13.2  11.8 - 14.4 % Final    Platelets 19/71/5907 324  138 - 453 k/uL Final    MPV 12/01/2021 9.4  8.1 - 13.5 fL Final    NRBC Automated 12/01/2021 0.0  0.0 per 100 WBC Final    Differential Type 12/01/2021 NOT REPORTED   Final    Seg Neutrophils 12/01/2021 47  36 - 65 % Final    Lymphocytes 12/01/2021 39  24 - 43 % Final    Monocytes 12/01/2021 8  3 - 12 % Final    Eosinophils % 12/01/2021 4  1 - 4 % Final    Basophils 12/01/2021 1  0 - 2 % Final    Immature Granulocytes 12/01/2021 1* 0 % Supraventricular tachycardia ([facility] [address] Yes    Essential hypertension     Pure hypercholesterolemia     Seasonal allergic rhinitis due to pollen     Impaired fasting blood sugar     Sigmoid diverticulosis     Obstructive sleep apnea     Arteriovenous malformation, other site     Colon cancer screening           Plan:       SVT: no concerns at present. Unaware of any recurrent episodes    Dizziness : very vague on review. chiropractor thought more bppv, I thought more orthostatic in the past. No concerning symptoms on review. Opting to observe. No significant issues over the interval.      Htn: no active treatment at present. Cont. Observation, salt avoidance, monitoring    Hyperlipidemia: variable numbers, ldl 126 to 182.  138 at present. Not interested in treatment at present. Allergies: mild symptoms at present. Has prn meds if needed. IFBS : mild/stable. Cont. Dietary discretion. a1c 6.1%. Diverticulosis; no symptoms long term. Fiber supplement recommended. Some constipation, doing better on colace , considering metamucil. Some increased gas and bloating at present. She plans to trial the beeno, plus mylicon  Or gas-X as needed. Considering trial of pro biotics. Colonoscopy up to date 2014. Mammogram in July. Low back pain. Mild/chronic. Not bothersome at present. New mattress in the last year. danielito: 2009 sleep study showing frequent danielito with desats. She did start cpap, but ultimately did not tolerate it. She felt too warm and pressure seemed too high by description. She tried different masks and ultimately stopped using. She had restarted cpap and seems to be tolerating it ok. She notes some perceived improvement in sleep/drowsiness issues now. Not compliant at present. Still with mask problems due to her anatomy. Denies drowsiness. She feels ablation helped this issue. Hemangioma of face and tongue.   Some suggestion of progression with more fullness in the mouth and left cheek, more difficulty. She has had some bleeding from the right side of the tongue near the tip. She has had 5 sclerotherapy procedures in [facility] and several small surgeries. Doing better at present . She notices the tongue and cheek are smaller. Speech is easier for others to understand. Tracheostomy has healed well. She had a 3 layer closure. She has had multiple reconstructive surgeries on the lower lip area. Much different appearance for her, doing well. Wearing invisiline braces at present. Bone grafting used to fill in the mandible below her incisors by report. Healed well. She is refusing repeat colonoscopy, history of polyp. She felt wiped out but the prep the last time. Willing to do cologuard. Negative 06/2020. She has some current gas and bloating and is re considering the colonoscopy as a better screening procedure. Willing to schedule at present. Sesamoiditis suspected in left great toe.  transitioning to standing and felt a sharp pain, persists with wt. Bearing at present. Discussed using a foam insert with a hole cut out around the bony painful area to take pressure off it while wt. Bearing.

## 2022-04-22 ENCOUNTER — OFFICE VISIT (OUTPATIENT)
Dept: PRIMARY CARE CLINIC | Age: 72
End: 2022-04-22
Payer: MEDICARE

## 2022-04-22 VITALS
TEMPERATURE: 97.5 F | HEART RATE: 73 BPM | SYSTOLIC BLOOD PRESSURE: 130 MMHG | OXYGEN SATURATION: 97 % | HEIGHT: 61 IN | BODY MASS INDEX: 24.35 KG/M2 | WEIGHT: 129 LBS | DIASTOLIC BLOOD PRESSURE: 88 MMHG

## 2022-04-22 DIAGNOSIS — M77.8 FINGER TENDINITIS: Primary | ICD-10-CM

## 2022-04-22 PROCEDURE — 3017F COLORECTAL CA SCREEN DOC REV: CPT | Performed by: NURSE PRACTITIONER

## 2022-04-22 PROCEDURE — 99212 OFFICE O/P EST SF 10 MIN: CPT | Performed by: NURSE PRACTITIONER

## 2022-04-22 PROCEDURE — G8420 CALC BMI NORM PARAMETERS: HCPCS | Performed by: NURSE PRACTITIONER

## 2022-04-22 PROCEDURE — 99213 OFFICE O/P EST LOW 20 MIN: CPT | Performed by: NURSE PRACTITIONER

## 2022-04-22 PROCEDURE — 1123F ACP DISCUSS/DSCN MKR DOCD: CPT | Performed by: NURSE PRACTITIONER

## 2022-04-22 PROCEDURE — 1090F PRES/ABSN URINE INCON ASSESS: CPT | Performed by: NURSE PRACTITIONER

## 2022-04-22 PROCEDURE — 4040F PNEUMOC VAC/ADMIN/RCVD: CPT | Performed by: NURSE PRACTITIONER

## 2022-04-22 PROCEDURE — 1036F TOBACCO NON-USER: CPT | Performed by: NURSE PRACTITIONER

## 2022-04-22 PROCEDURE — G8427 DOCREV CUR MEDS BY ELIG CLIN: HCPCS | Performed by: NURSE PRACTITIONER

## 2022-04-22 PROCEDURE — G8399 PT W/DXA RESULTS DOCUMENT: HCPCS | Performed by: NURSE PRACTITIONER

## 2022-04-22 RX ORDER — PREDNISONE 20 MG/1
20 TABLET ORAL DAILY
Qty: 5 TABLET | Refills: 0 | Status: SHIPPED | OUTPATIENT
Start: 2022-04-22 | End: 2022-04-27

## 2022-04-22 ASSESSMENT — ENCOUNTER SYMPTOMS: ABDOMINAL PAIN: 0

## 2022-04-22 NOTE — PROGRESS NOTES
[facility]             [address], [address]                        Telephone [phone]             Fax [phone]     Clint Stevenson  1950  QWL:[id number]   Date of visit:  4/22/2022    Subjective:    Clint Stevenson is a 74 y.o.  female who presents to [facility] today (4/22/2022) for evaluation of:    Chief Complaint   Patient presents with    Other     pain in R ahnd fingers . pain is sharp shooting pains. began on sunday off/on all week. but today the shooting pain is back. Other  This is a new problem. The current episode started in the past 7 days (07/17/22). The problem occurs constantly. The problem has been waxing and waning. Pertinent negatives include no abdominal pain, chest pain, fever, joint swelling, numbness or rash. Associated symptoms comments: Right middle finger began to have shooting pain after pulling slacks on 04/17/22. Since then there is a generalized ache in the right middle finger. Today while make food, she noticed an intermittent shooting pain in right middle finger again. . Exacerbated by: grasping with right hand. She has tried nothing for the symptoms. The treatment provided no relief.        She has the following problem list:  Patient Active Problem List   Diagnosis    Mitral valve prolapse    Supraventricular tachycardia ([facility] [address]    Hypertension    Hyperlipidemia    Allergic rhinitis    Impaired fasting blood sugar    Sigmoid diverticulosis    Obstructive sleep apnea    Cataract, post subcapsular polar senile    Hemangioma of face    Venous malformation    History of colon polyps        Current medications are:  Current Outpatient Medications   Medication Sig Dispense Refill    predniSONE (DELTASONE) 20 MG tablet Take 1 tablet by mouth daily for 5 days 5 tablet 0    psyllium (KONSYL) 28.3 % PACK Take by mouth daily 1 teaspoon daily      Calcium Carbonate-Vit D-Min (CALTRATE PLUS PO) Take by mouth Chocolate chews      docusate sodium (COLACE) 100 MG capsule Take 100 mg by mouth as needed for Constipation       No current facility-administered medications for this visit. She is allergic to sulfa antibiotics, ciprofloxacin, e-mycin [erythromycin], and penicillins. .    She  reports that she has never smoked. She has never used smokeless tobacco.      Objective:    Vitals:    04/22/22 1559   BP: 130/88   Site: Left Upper Arm   Position: Sitting   Cuff Size: Medium Adult   Pulse: 73   Temp: 97.5 °F (36.4 °C)   TempSrc: Temporal   SpO2: 97%   Weight: 129 lb (58.5 kg)   Height: 5' 1\" (1.549 m)     Body mass index is 24.37 kg/m². Review of Systems   Constitutional: Negative for fever. Cardiovascular: Negative for chest pain. Gastrointestinal: Negative for abdominal pain. Musculoskeletal: Negative for joint swelling. Skin: Negative for rash. Neurological: Negative for numbness. Physical Exam  Vitals and nursing note reviewed. Constitutional:       Appearance: She is well-developed. HENT:      Head: Normocephalic. Eyes:      Pupils: Pupils are equal, round, and reactive to light. Cardiovascular:      Rate and Rhythm: Normal rate and regular rhythm. Heart sounds: Normal heart sounds. Pulmonary:      Effort: Pulmonary effort is normal.      Breath sounds: Normal breath sounds and air entry. Musculoskeletal:      Right hand: Normal capillary refill. Normal pulse. Left hand: Normal capillary refill. Normal pulse. Cervical back: Normal range of motion and neck supple. Comments: Pain at proximal phalange of right middle finger with grasping and pulling. Mild tenderness with palpation of proximal phalange. No swelling or erythema noted. Skin:     General: Skin is warm and dry. Neurological:      General: No focal deficit present. Mental Status: She is alert and oriented to person, place, and time. Psychiatric:         Behavior: Behavior normal.         Thought Content: Thought content normal.       Assessment and Plan:    No results found for this visit on 04/22/22. Diagnosis Orders   1. Finger tendinitis  predniSONE (DELTASONE) 20 MG tablet     Take prednisone as directed. Take Tylenol as needed for pain. Rest the affected painful area. Apply cold compresses intermittently as needed. As pain recedes, begin normal activities slowly as tolerated. Follow up with PCP if symptoms do not improve or worsen. The use, risks, benefits, and side effects of prescribed or recommended medications were discussed. All questions were answered and the patient/caregiver voiced understanding. No orders of the defined types were placed in this encounter.         Electronically signed by MARIS Hahn CNP on 4/22/22 at 4:06 PM EDT

## 2022-04-22 NOTE — PATIENT INSTRUCTIONS
Patient Education        Tendon Injury (Tendinopathy): Care Instructions  Your Care Instructions     Tendons are tough, flexible tissues that connect muscle to bone. A tendon can hurt or get torn from overuse or aging, especially tendons in the shoulder, elbow, wrist, hip, knee, or ankle. Tendon injuries may be called tendinopathy or tendinitis. Tendon injuries can occur from any motion you have to repeat eusebio job, sports, or daily activities. Tennis elbow is one common tendon injury. You can treat most tendon problems with over-the-counter pain medicine, rest,changes in your activities, and physical therapy. Follow-up care is a key part of your treatment and safety. Be sure to make and go to all appointments, and call your doctor if you are having problems. It's also a good idea to know your test results and keep alist of the medicines you take. How can you care for yourself at home?  Rest the sore area. You may have to stop doing the activity that caused the tendon pain for a while.  Take an over-the-counter pain medicine, such as acetaminophen (Tylenol), ibuprofen (Advil, Motrin), or naproxen (Aleve). Read and follow all instructions on the label.  Do not take two or more pain medicines at the same time unless the doctor told you to. Many pain medicines have acetaminophen, which is Tylenol. Too much acetaminophen (Tylenol) can be harmful.  Put ice or a cold pack on the sore area for 10 to 20 minutes at a time. Try to do this every 1 to 2 hours for the next 3 days (when you are awake) or until any swelling goes down. Put a thin cloth between the ice and your skin.  Prop up the sore area on a pillow when you ice it or anytime you sit or lie down during the next 3 days. Try to keep it above the level of your heart. This will help reduce swelling.    Follow your doctor's advice for wearing and caring for a sling, splint, or cast. In some cases, you may wear one of these for a while to help your tendon heal.   Follow your doctor's advice for stretching and physical therapy. Gently move your joint through its full range of motion. This will prevent stiffness in your joint.  Go back to your activity slowly. Warm up before and stretch after the activity. You also can try making some changes. For example, if a sport caused your tendon pain, alternate the sport with another activity. If using a tool causes pain, switch hands or change your . Stop the activity if it hurts. After the activity, apply ice to prevent pain and swelling.  Do not smoke. Smoking can slow healing. If you need help quitting, talk to your doctor about stop-smoking programs and medicines. These can increase your chances of quitting for good. When should you call for help? Watch closely for changes in your health, and be sure to contact your doctor if:     Your pain gets worse.      You do not get better as expected. Where can you learn more? Go to https://[employer]. org and sign in to your [employer] account. Enter A157 in the [employer] box to learn more about \"Tendon Injury (Tendinopathy): Care Instructions. \"     If you do not have an account, please click on the \"Sign Up Now\" link. Current as of: July 1, 2021               Content Version: 13.2  © 2006-2022 [employer]. Care instructions adapted under license by [facility] ([facility]). If you have questions about a medical condition or this instruction, always ask your healthcare professional. [place] [address] any warranty or liability for your use of this information.

## 2022-06-02 ENCOUNTER — HOSPITAL ENCOUNTER (OUTPATIENT)
Dept: LAB | Age: 72
Discharge: HOME | End: 2022-06-02
Payer: MEDICARE

## 2022-06-02 DIAGNOSIS — R73.01 IMPAIRED FASTING BLOOD SUGAR: ICD-10-CM

## 2022-06-02 DIAGNOSIS — E78.00 PURE HYPERCHOLESTEROLEMIA: ICD-10-CM

## 2022-06-02 DIAGNOSIS — I10 ESSENTIAL HYPERTENSION: ICD-10-CM

## 2022-06-02 LAB
ABSOLUTE EOS #: 0.13 K/UL (ref 0–0.44)
ABSOLUTE IMMATURE GRANULOCYTE: <0.03 K/UL (ref 0–0.3)
ABSOLUTE LYMPH #: 1.92 K/UL (ref 1.1–3.7)
ABSOLUTE MONO #: 0.59 K/UL (ref 0.1–1.2)
ALBUMIN SERPL-MCNC: 4.3 G/DL (ref 3.5–5.2)
ALBUMIN/GLOBULIN RATIO: 1.3 (ref 1–2.5)
ALP BLD-CCNC: 73 U/L (ref 35–104)
ALT SERPL-CCNC: 14 U/L (ref 5–33)
ANION GAP SERPL CALCULATED.3IONS-SCNC: 9 MMOL/L (ref 9–17)
AST SERPL-CCNC: 15 U/L
BASOPHILS # BLD: 1 % (ref 0–2)
BASOPHILS ABSOLUTE: 0.05 K/UL (ref 0–0.2)
BILIRUB SERPL-MCNC: 0.45 MG/DL (ref 0.3–1.2)
BUN BLDV-MCNC: 13 MG/DL (ref 8–23)
BUN/CREAT BLD: 19 (ref 9–20)
CALCIUM SERPL-MCNC: 9.3 MG/DL (ref 8.6–10.4)
CHLORIDE BLD-SCNC: 104 MMOL/L (ref 98–107)
CHOLESTEROL/HDL RATIO: 4.3
CHOLESTEROL: 223 MG/DL
CO2: 30 MMOL/L (ref 20–31)
CREAT SERPL-MCNC: 0.7 MG/DL (ref 0.5–0.9)
EOSINOPHILS RELATIVE PERCENT: 2 % (ref 1–4)
ESTIMATED AVERAGE GLUCOSE: 128 MG/DL
GFR AFRICAN AMERICAN: >60 ML/MIN
GFR NON-AFRICAN AMERICAN: >60 ML/MIN
GFR SERPL CREATININE-BSD FRML MDRD: ABNORMAL ML/MIN/{1.73_M2}
GLUCOSE BLD-MCNC: 109 MG/DL (ref 70–99)
HBA1C MFR BLD: 6.1 % (ref 4–6)
HCT VFR BLD CALC: 43.2 % (ref 36.3–47.1)
HDLC SERPL-MCNC: 52 MG/DL
HEMOGLOBIN: 14 G/DL (ref 11.9–15.1)
IMMATURE GRANULOCYTES: 0 %
LDL CHOLESTEROL: 144 MG/DL (ref 0–130)
LYMPHOCYTES # BLD: 32 % (ref 24–43)
MCH RBC QN AUTO: 30.2 PG (ref 25.2–33.5)
MCHC RBC AUTO-ENTMCNC: 32.4 G/DL (ref 25.2–33.5)
MCV RBC AUTO: 93.1 FL (ref 82.6–102.9)
MONOCYTES # BLD: 10 % (ref 3–12)
NRBC AUTOMATED: 0 PER 100 WBC
PDW BLD-RTO: 13.8 % (ref 11.8–14.4)
PLATELET # BLD: 230 K/UL (ref 138–453)
PMV BLD AUTO: 9.6 FL (ref 8.1–13.5)
POTASSIUM SERPL-SCNC: 4.3 MMOL/L (ref 3.7–5.3)
RBC # BLD: 4.64 M/UL (ref 3.95–5.11)
SEG NEUTROPHILS: 55 % (ref 36–65)
SEGMENTED NEUTROPHILS ABSOLUTE COUNT: 3.36 K/UL (ref 1.5–8.1)
SODIUM BLD-SCNC: 143 MMOL/L (ref 135–144)
TOTAL PROTEIN: 7.5 G/DL (ref 6.4–8.3)
TRIGL SERPL-MCNC: 133 MG/DL
WBC # BLD: 6.1 K/UL (ref 3.5–11.3)

## 2022-06-02 PROCEDURE — 80061 LIPID PANEL: CPT

## 2022-06-02 PROCEDURE — 36415 COLL VENOUS BLD VENIPUNCTURE: CPT

## 2022-06-02 PROCEDURE — 80053 COMPREHEN METABOLIC PANEL: CPT

## 2022-06-02 PROCEDURE — 85025 COMPLETE CBC W/AUTO DIFF WBC: CPT

## 2022-06-02 PROCEDURE — 83036 HEMOGLOBIN GLYCOSYLATED A1C: CPT

## 2022-06-09 ENCOUNTER — OFFICE VISIT (OUTPATIENT)
Dept: FAMILY MEDICINE CLINIC | Age: 72
End: 2022-06-09
Payer: MEDICARE

## 2022-06-09 VITALS
HEART RATE: 72 BPM | WEIGHT: 126 LBS | HEIGHT: 61 IN | SYSTOLIC BLOOD PRESSURE: 126 MMHG | BODY MASS INDEX: 23.79 KG/M2 | DIASTOLIC BLOOD PRESSURE: 64 MMHG

## 2022-06-09 DIAGNOSIS — I10 ESSENTIAL HYPERTENSION: ICD-10-CM

## 2022-06-09 DIAGNOSIS — E78.00 PURE HYPERCHOLESTEROLEMIA: ICD-10-CM

## 2022-06-09 DIAGNOSIS — R73.01 IMPAIRED FASTING BLOOD SUGAR: ICD-10-CM

## 2022-06-09 DIAGNOSIS — Z12.11 COLON CANCER SCREENING: ICD-10-CM

## 2022-06-09 DIAGNOSIS — G47.33 OBSTRUCTIVE SLEEP APNEA: ICD-10-CM

## 2022-06-09 DIAGNOSIS — J30.1 SEASONAL ALLERGIC RHINITIS DUE TO POLLEN: ICD-10-CM

## 2022-06-09 DIAGNOSIS — Q27.39 ARTERIOVENOUS MALFORMATION, OTHER SITE: ICD-10-CM

## 2022-06-09 DIAGNOSIS — K57.30 SIGMOID DIVERTICULOSIS: ICD-10-CM

## 2022-06-09 DIAGNOSIS — I47.1 SUPRAVENTRICULAR TACHYCARDIA (HCC): Primary | ICD-10-CM

## 2022-06-09 PROBLEM — Z93.0 TRACHEOSTOMY STATUS (HCC): Status: ACTIVE | Noted: 2022-06-09

## 2022-06-09 PROCEDURE — 1036F TOBACCO NON-USER: CPT | Performed by: FAMILY MEDICINE

## 2022-06-09 PROCEDURE — 1123F ACP DISCUSS/DSCN MKR DOCD: CPT | Performed by: FAMILY MEDICINE

## 2022-06-09 PROCEDURE — 99213 OFFICE O/P EST LOW 20 MIN: CPT | Performed by: FAMILY MEDICINE

## 2022-06-09 PROCEDURE — 1090F PRES/ABSN URINE INCON ASSESS: CPT | Performed by: FAMILY MEDICINE

## 2022-06-09 PROCEDURE — G8420 CALC BMI NORM PARAMETERS: HCPCS | Performed by: FAMILY MEDICINE

## 2022-06-09 PROCEDURE — G8427 DOCREV CUR MEDS BY ELIG CLIN: HCPCS | Performed by: FAMILY MEDICINE

## 2022-06-09 PROCEDURE — 3017F COLORECTAL CA SCREEN DOC REV: CPT | Performed by: FAMILY MEDICINE

## 2022-06-09 PROCEDURE — G8399 PT W/DXA RESULTS DOCUMENT: HCPCS | Performed by: FAMILY MEDICINE

## 2022-06-09 PROCEDURE — 99214 OFFICE O/P EST MOD 30 MIN: CPT | Performed by: FAMILY MEDICINE

## 2022-06-09 ASSESSMENT — ENCOUNTER SYMPTOMS
RESPIRATORY NEGATIVE: 1
CONSTIPATION: 1
RHINORRHEA: 1
EYES NEGATIVE: 1

## 2022-06-09 NOTE — PROGRESS NOTES
Subjective:      Patient ID: Darshana Floyd is a 74 y.o. female. Hypertension    Hyperlipidemia       Routine follow up on chronic medical conditions, refills, and review of updated labs. I have reviewed the patient's medical history in detail and updated the computerized patient record. She is feeling fairly well at present. Recent fall, going to pet a friends cat and caught her shoe on a basket last Sunday night. No perceived injuries today. 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 5 sclerotherapy treatments. Some reconstruction surgery of tracheostomy closure and lower left lip. She has completed procedures and is now working with dentist.  She needs some bone addition to mandibular area by description. Planning bracing and other dental work after that. invisiline braces being used at present. Night retainers in the future. She did have some bone graft under 6 of her mandibular incisors area by report. Has healed well. Allergies fairly mild at present. Intermittent rhinorrhea at present. Starting claritin daily. No significant gerd symptoms. Non compliant with cpap. She thinks interventions have improved this condition. No palpitations of concern. No bladder issues. Using prunes to help with constipation. Working well for her. Increased gas/bloating/flatulence at present. She feels she has likely gained some wt. During covid restrictions. No covid concerns at present. Staying home for the most part. Single episode described where she felt she was very close to passing out. On 5/7/22: she was watching tv. Sitting still, bending over to eat, and while leaning back had a brief sense of going to pass out, then gone. No palpitations or chest pain perceived. Past Medical History:   Diagnosis Date    Allergic rhinitis     GERD (gastroesophageal reflux disease)     Hemangioma of face     And arm.     Impaired fasting blood sugar     Internal hemorrhoids     Irritable bowel syndrome     Menometrorrhagia     Severe.  Mitral valve prolapse     Obstructive sleep apnea     non compliant with cpap    Osteoarthritis     Sigmoid diverticulosis     Supraventricular tachycardia [facility])      Past Surgical History:   Procedure Laterality Date    BREAST BIOPSY Right 11/18/1987    excisional biopsy    CATARACT REMOVAL WITH IMPLANT Left 11/08/2016    CATARACT REMOVAL WITH IMPLANT  12/06/2016    CHOLECYSTECTOMY  03/20/2000    COLONOSCOPY  06/12/2009    Showed sigmoid diverticulosis and internal hemorrhoids.  COLONOSCOPY  11/19/2014    diverticulosis, rectal polyp hyperplastic    COLONOSCOPY N/A 7/22/2021    COLONOSCOPY HOT BIOPSY performed by Estephania Obrien DO at [address] [place] [address]  01/08/2018    HYSTERECTOMY (CERVIX STATUS UNKNOWN)  05/01/2000    MOUTH SURGERY      OTHER SURGICAL HISTORY      Scleratherapy-tongue, bottom lip and chin    OVARY REMOVAL Bilateral 05/2000    With leiomyoma noted.  TONSILLECTOMY      TRACHEOSTOMY  11/14/2017    UPPER GASTROINTESTINAL ENDOSCOPY  01/31/2014    multiple fundic gland polyps benign. hiatal hernia, hemangioma of the tongue and base of the tongue and hypopharnx     Current Outpatient Medications   Medication Sig Dispense Refill    psyllium (KONSYL) 28.3 % PACK Take by mouth daily 1 teaspoon daily      Calcium Carbonate-Vit D-Min (CALTRATE PLUS PO) Take by mouth Chocolate chews      docusate sodium (COLACE) 100 MG capsule Take 100 mg by mouth as needed for Constipation       No current facility-administered medications for this visit. Allergies   Allergen Reactions    Sulfa Antibiotics Swelling    Ciprofloxacin      Does not set well with taste d/t g-tube placement    E-Mycin [Erythromycin] Nausea And Vomiting    Penicillins      Childhood. Review of Systems   Constitutional: Negative. HENT: Positive for congestion, rhinorrhea and sneezing.     Eyes: Negative. Respiratory: Negative. Cardiovascular: Negative. Gastrointestinal: Positive for constipation. Endocrine: Negative. Genitourinary: Negative. Musculoskeletal: Negative. Skin: Negative. Allergic/Immunologic: Positive for environmental allergies. Neurological: Negative. Hematological: Negative. Psychiatric/Behavioral: Negative. Objective:   Physical Exam  Constitutional:       General: She is not in acute distress. HENT:      Head: Normocephalic and atraumatic. Nose: Nose normal.   Eyes:      Pupils: Pupils are equal, round, and reactive to light. Cardiovascular:      Rate and Rhythm: Normal rate. Pulmonary:      Effort: Pulmonary effort is normal. No respiratory distress. Abdominal:      General: There is no distension. Musculoskeletal:         General: Normal range of motion. Skin:     General: Skin is warm. Findings: No rash. Neurological:      Mental Status: She is alert. Mental status is at baseline. Psychiatric:         Mood and Affect: Mood normal.         Behavior: Behavior normal.         Thought Content:  Thought content normal.         Judgment: Judgment normal.       /64 (Site: Right Upper Arm, Position: Sitting, Cuff Size: Small Adult)   Pulse 72   Ht 5' 1\" (1.549 m)   Wt 126 lb (57.2 kg)   LMP 05/06/2000   BMI 23.81 kg/m²     Hospital Outpatient Visit on 06/02/2022   Component Date Value Ref Range Status    Hemoglobin A1C 06/02/2022 6.1* 4.0 - 6.0 % Final    Estimated Avg Glucose 06/02/2022 128  mg/dL Final    WBC 06/02/2022 6.1  3.5 - 11.3 k/uL Final    RBC 06/02/2022 4.64  3.95 - 5.11 m/uL Final    Hemoglobin 06/02/2022 14.0  11.9 - 15.1 g/dL Final    Hematocrit 06/02/2022 43.2  36.3 - 47.1 % Final    MCV 06/02/2022 93.1  82.6 - 102.9 fL Final    MCH 06/02/2022 30.2  25.2 - 33.5 pg Final    MCHC 06/02/2022 32.4  25.2 - 33.5 g/dL Final    RDW 06/02/2022 13.8  11.8 - 14.4 % Final    Platelets 00/66/4593 230  138 - 453 k/uL Final    MPV 06/02/2022 9.6  8.1 - 13.5 fL Final    NRBC Automated 06/02/2022 0.0  0.0 per 100 WBC Final    Seg Neutrophils 06/02/2022 55  36 - 65 % Final    Lymphocytes 06/02/2022 32  24 - 43 % Final    Monocytes 06/02/2022 10  3 - 12 % Final    Eosinophils % 06/02/2022 2  1 - 4 % Final    Basophils 06/02/2022 1  0 - 2 % Final    Immature Granulocytes 06/02/2022 0  0 % Final    Segs Absolute 06/02/2022 3.36  1.50 - 8.10 k/uL Final    Absolute Lymph # 06/02/2022 1.92  1.10 - 3.70 k/uL Final    Absolute Mono # 06/02/2022 0.59  0.10 - 1.20 k/uL Final    Absolute Eos # 06/02/2022 0.13  0.00 - 0.44 k/uL Final    Basophils Absolute 06/02/2022 0.05  0.00 - 0.20 k/uL Final    Absolute Immature Granulocyte 06/02/2022 <0.03  0.00 - 0.30 k/uL Final    Glucose 06/02/2022 109* 70 - 99 mg/dL Final    BUN 06/02/2022 13  8 - 23 mg/dL Final    CREATININE 06/02/2022 0.70  0.50 - 0.90 mg/dL Final    Bun/Cre Ratio 06/02/2022 19  9 - 20 Final    Calcium 06/02/2022 9.3  8.6 - 10.4 mg/dL Final    Sodium 06/02/2022 143  135 - 144 mmol/L Final    Potassium 06/02/2022 4.3  3.7 - 5.3 mmol/L Final    Chloride 06/02/2022 104  98 - 107 mmol/L Final    CO2 06/02/2022 30  20 - 31 mmol/L Final    Anion Gap 06/02/2022 9  9 - 17 mmol/L Final    Alkaline Phosphatase 06/02/2022 73  35 - 104 U/L Final    ALT 06/02/2022 14  5 - 33 U/L Final    AST 06/02/2022 15  <32 U/L Final    Total Bilirubin 06/02/2022 0.45  0.3 - 1.2 mg/dL Final    Total Protein 06/02/2022 7.5  6.4 - 8.3 g/dL Final    Albumin 06/02/2022 4.3  3.5 - 5.2 g/dL Final    Albumin/Globulin Ratio 06/02/2022 1.3  1.0 - 2.5 Final    GFR Non- 06/02/2022 >60  >60 mL/min Final    GFR  06/02/2022 >60  >60 mL/min Final    GFR Comment 06/02/2022        Final    Cholesterol 06/02/2022 223* <200 mg/dL Final    HDL 06/02/2022 52  >40 mg/dL Final    LDL Cholesterol 06/02/2022 144* 0 - 130 mg/dL Final    Chol/HDL Ratio 06/02/2022 4.3  <5 Final    Triglycerides 06/02/2022 133  <150 mg/dL Final       Assessment:       Encounter Diagnoses   Name Primary?  Supraventricular tachycardia ([address] Yes    Essential hypertension     Pure hypercholesterolemia     Seasonal allergic rhinitis due to pollen     Impaired fasting blood sugar     Sigmoid diverticulosis     Obstructive sleep apnea     Arteriovenous malformation, other site     Colon cancer screening             Plan:       SVT: no concerns at present. Unaware of any recurrent episodes    Dizziness : very vague on review. chiropractor thought more bppv, I thought more orthostatic in the past. No concerning symptoms on review. Opting to observe. No significant issues over the interval.      Htn: no active treatment at present. Cont. Observation, salt avoidance, monitoring    Hyperlipidemia: variable numbers, ldl 126 to 182.  144 at present. Not interested in treatment at present. Allergies: mild symptoms at present. Has prn meds if needed. IFBS : mild/stable. Cont. Dietary discretion. a1c 6.1%. Diverticulosis; no symptoms long term. Fiber supplement recommended. Some constipation, doing better on colace , considering metamucil. Some increased gas and bloating at present. She plans to trial the beeno, plus mylicon  Or gas-X as needed. Considering trial of pro biotics. Low back pain. Mild/chronic. Not bothersome at present. New mattress in the last year. danielito: 2009 sleep study showing frequent danielito with desats. She did start cpap, but ultimately did not tolerate it. She felt too warm and pressure seemed too high by description. She tried different masks and ultimately stopped using. She had restarted cpap and seems to be tolerating it ok. She notes some perceived improvement in sleep/drowsiness issues now. Not compliant at present. Still with mask problems due to her anatomy. Denies drowsiness. She feels ablation helped this issue. Hemangioma of face and tongue. Some suggestion of progression with more fullness in the mouth and left cheek, more difficulty. She has had some bleeding from the right side of the tongue near the tip. She has had 5 sclerotherapy procedures in [facility] and several small surgeries. Doing better at present . She notices the tongue and cheek are smaller. Speech is easier for others to understand. Tracheostomy has healed well. She had a 3 layer closure. She has had multiple reconstructive surgeries on the lower lip area. Much different appearance for her, doing well. Wearing invisiline braces at present. Bone grafting used to fill in the mandible below her incisors by report. Healed well. Completed colonoscopy 7/22/21: 2 polyps hyperplastic. Mild sigmoid diverticulosis. 5 yr follow up due to polyp history. Near syncope episode watching tv  She was bending over to eat, then sat up. She describes another sensation in the chest, very brief/gone. Hard to describe. Near syncope episode. Probable premature beat felt in the chest.  Has not had syncope. Episodes fairly acute/brief, few seconds. Possible pvc.

## 2022-06-09 NOTE — PATIENT INSTRUCTIONS
Hospital Outpatient Visit on 06/02/2022   Component Date Value Ref Range Status    Hemoglobin A1C 06/02/2022 6.1* 4.0 - 6.0 % Final    Estimated Avg Glucose 06/02/2022 128  mg/dL Final    Comment: The ADA and AACC recommend providing the estimated average glucose result to permit better   patient understanding of their HBA1c result.       WBC 06/02/2022 6.1  3.5 - 11.3 k/uL Final    RBC 06/02/2022 4.64  3.95 - 5.11 m/uL Final    Hemoglobin 06/02/2022 14.0  11.9 - 15.1 g/dL Final    Hematocrit 06/02/2022 43.2  36.3 - 47.1 % Final    MCV 06/02/2022 93.1  82.6 - 102.9 fL Final    MCH 06/02/2022 30.2  25.2 - 33.5 pg Final    MCHC 06/02/2022 32.4  25.2 - 33.5 g/dL Final    RDW 06/02/2022 13.8  11.8 - 14.4 % Final    Platelets 03/09/3178 230  138 - 453 k/uL Final    MPV 06/02/2022 9.6  8.1 - 13.5 fL Final    NRBC Automated 06/02/2022 0.0  0.0 per 100 WBC Final    Seg Neutrophils 06/02/2022 55  36 - 65 % Final    Lymphocytes 06/02/2022 32  24 - 43 % Final    Monocytes 06/02/2022 10  3 - 12 % Final    Eosinophils % 06/02/2022 2  1 - 4 % Final    Basophils 06/02/2022 1  0 - 2 % Final    Immature Granulocytes 06/02/2022 0  0 % Final    Segs Absolute 06/02/2022 3.36  1.50 - 8.10 k/uL Final    Absolute Lymph # 06/02/2022 1.92  1.10 - 3.70 k/uL Final    Absolute Mono # 06/02/2022 0.59  0.10 - 1.20 k/uL Final    Absolute Eos # 06/02/2022 0.13  0.00 - 0.44 k/uL Final    Basophils Absolute 06/02/2022 0.05  0.00 - 0.20 k/uL Final    Absolute Immature Granulocyte 06/02/2022 <0.03  0.00 - 0.30 k/uL Final    Glucose 06/02/2022 109* 70 - 99 mg/dL Final    BUN 06/02/2022 13  8 - 23 mg/dL Final    CREATININE 06/02/2022 0.70  0.50 - 0.90 mg/dL Final    Bun/Cre Ratio 06/02/2022 19  9 - 20 Final    Calcium 06/02/2022 9.3  8.6 - 10.4 mg/dL Final    Sodium 06/02/2022 143  135 - 144 mmol/L Final    Potassium 06/02/2022 4.3  3.7 - 5.3 mmol/L Final    Chloride 06/02/2022 104  98 - 107 mmol/L Final    CO2 06/02/2022 30  20 - 31 mmol/L Final    Anion Gap 06/02/2022 9  9 - 17 mmol/L Final    Alkaline Phosphatase 06/02/2022 73  35 - 104 U/L Final    ALT 06/02/2022 14  5 - 33 U/L Final    AST 06/02/2022 15  <32 U/L Final    Total Bilirubin 06/02/2022 0.45  0.3 - 1.2 mg/dL Final    Total Protein 06/02/2022 7.5  6.4 - 8.3 g/dL Final    Albumin 06/02/2022 4.3  3.5 - 5.2 g/dL Final    Albumin/Globulin Ratio 06/02/2022 1.3  1.0 - 2.5 Final    GFR Non- 06/02/2022 >60  >60 mL/min Final    GFR  06/02/2022 >60  >60 mL/min Final    GFR Comment 06/02/2022        Final    Comment: Average GFR for 79or more years old:   76 mL/min/1.73sq m  Chronic Kidney Disease:   <60 mL/min/1.73sq m  Kidney failure:   <15 mL/min/1.73sq m              eGFR calculated using average adult body mass. Additional eGFR calculator available at:        [email or web address]            Cholesterol 06/02/2022 223* <200 mg/dL Final    Comment:    Cholesterol Guidelines:      <200  Desirable   200-240  Borderline      >240  Undesirable         HDL 06/02/2022 52  >40 mg/dL Final    Comment:    HDL Guidelines:    <40     Undesirable   40-59    Borderline    >59     Desirable         LDL Cholesterol 06/02/2022 144* 0 - 130 mg/dL Final    Comment:    LDL Guidelines:     <100    Desirable   100-129   Near to/above Desirable   130-159   Borderline      >159   Undesirable     Direct (measured) LDL and calculated LDL are not interchangeable tests.  Chol/HDL Ratio 06/02/2022 4.3  <5 Final            Triglycerides 06/02/2022 133  <150 mg/dL Final    Comment:    Triglyceride Guidelines:     <150   Desirable   150-199  Borderline   200-499  High     >499   Very high   Based on AHA Guidelines for fasting triglyceride, October 2012.

## 2022-06-29 ENCOUNTER — OFFICE VISIT (OUTPATIENT)
Dept: CARDIOLOGY | Age: 72
End: 2022-06-29
Payer: MEDICARE

## 2022-06-29 VITALS
BODY MASS INDEX: 23.6 KG/M2 | HEIGHT: 61 IN | DIASTOLIC BLOOD PRESSURE: 76 MMHG | WEIGHT: 125 LBS | SYSTOLIC BLOOD PRESSURE: 144 MMHG | HEART RATE: 86 BPM

## 2022-06-29 DIAGNOSIS — E78.5 HYPERLIPIDEMIA, UNSPECIFIED HYPERLIPIDEMIA TYPE: ICD-10-CM

## 2022-06-29 DIAGNOSIS — R55 PRE-SYNCOPE: Primary | ICD-10-CM

## 2022-06-29 DIAGNOSIS — I47.1 SVT (SUPRAVENTRICULAR TACHYCARDIA) (HCC): ICD-10-CM

## 2022-06-29 DIAGNOSIS — I34.1 MVP (MITRAL VALVE PROLAPSE): ICD-10-CM

## 2022-06-29 PROCEDURE — 93010 ELECTROCARDIOGRAM REPORT: CPT | Performed by: INTERNAL MEDICINE

## 2022-06-29 PROCEDURE — 99214 OFFICE O/P EST MOD 30 MIN: CPT | Performed by: INTERNAL MEDICINE

## 2022-06-29 PROCEDURE — G8399 PT W/DXA RESULTS DOCUMENT: HCPCS | Performed by: INTERNAL MEDICINE

## 2022-06-29 PROCEDURE — 99204 OFFICE O/P NEW MOD 45 MIN: CPT | Performed by: INTERNAL MEDICINE

## 2022-06-29 PROCEDURE — 1036F TOBACCO NON-USER: CPT | Performed by: INTERNAL MEDICINE

## 2022-06-29 PROCEDURE — 1090F PRES/ABSN URINE INCON ASSESS: CPT | Performed by: INTERNAL MEDICINE

## 2022-06-29 PROCEDURE — G8427 DOCREV CUR MEDS BY ELIG CLIN: HCPCS | Performed by: INTERNAL MEDICINE

## 2022-06-29 PROCEDURE — G8420 CALC BMI NORM PARAMETERS: HCPCS | Performed by: INTERNAL MEDICINE

## 2022-06-29 PROCEDURE — 93005 ELECTROCARDIOGRAM TRACING: CPT | Performed by: INTERNAL MEDICINE

## 2022-06-29 PROCEDURE — 3017F COLORECTAL CA SCREEN DOC REV: CPT | Performed by: INTERNAL MEDICINE

## 2022-06-29 PROCEDURE — 1123F ACP DISCUSS/DSCN MKR DOCD: CPT | Performed by: INTERNAL MEDICINE

## 2022-06-29 RX ORDER — ATORVASTATIN CALCIUM 20 MG/1
20 TABLET, FILM COATED ORAL DAILY
Qty: 30 TABLET | Refills: 5 | Status: SHIPPED | OUTPATIENT
Start: 2022-06-29

## 2022-06-29 NOTE — PROGRESS NOTES
Today's Date: 6/29/2022  Patient's Name: Janine Harrison  Patient's age: 74 y.o., 1950    Subjective:  Janine Harrison is being seen in clinic today regarding Palpitations, h/o SVT    she is here to establish cardiology care. She has h/o SVT and MVP 1995. Pre syncope x 3 since March 2019. On 5/7/22 while watching TV, she bend over to eat and while leaning back had a brief sense of going to pass out. She denied any syncope. She denies any chest pain or dyspnea. Past Medical History:   has a past medical history of Allergic rhinitis, GERD (gastroesophageal reflux disease), Hemangioma of face, Impaired fasting blood sugar, Internal hemorrhoids, Irritable bowel syndrome, Menometrorrhagia, Mitral valve prolapse, Obstructive sleep apnea, Osteoarthritis, Sigmoid diverticulosis, and Supraventricular tachycardia ([facility] [address] Past Surgical History:   has a past surgical history that includes Cholecystectomy (03/20/2000); Mouth surgery; Tonsillectomy; Upper gastrointestinal endoscopy (01/31/2014); Colonoscopy (06/12/2009); Colonoscopy (11/19/2014); Cataract removal with implant (Left, 11/08/2016); Cataract removal with implant (12/06/2016); tracheostomy (11/14/2017); Gastrostomy tube placement (01/08/2018); other surgical history; Breast biopsy (Right, 11/18/1987); Hysterectomy (05/01/2000); Ovary removal (Bilateral, 05/2000); and Colonoscopy (N/A, 7/22/2021). Home Medications:  Prior to Admission medications    Medication Sig Start Date End Date Taking?  Authorizing Provider   psyllium (KONSYL) 28.3 % PACK Take by mouth daily 1 teaspoon daily    Historical Provider, MD   Calcium Carbonate-Vit D-Min (CALTRATE PLUS PO) Take by mouth Chocolate chews    Historical Provider, MD   docusate sodium (COLACE) 100 MG capsule Take 100 mg by mouth as needed for Constipation    Historical Provider, MD       Allergies:  Sulfa antibiotics, Ciprofloxacin, E-mycin [erythromycin], and Penicillins    Social History:   reports that she has never smoked. She has never used smokeless tobacco. She reports that she does not drink alcohol and does not use drugs. Family History: family history includes Diabetes in an other family member; Glaucoma in her mother; Heart Disease in her mother; Pacemaker in her mother. No h/o sudden cardiac death. No for premature CAD    REVIEW OF SYSTEMS:    · Constitutional: there has been no unanticipated weight loss. There's been No change in energy level, No change in activity level. · Eyes: No visual changes or diplopia. No scleral icterus. · ENT: No Headaches, hearing loss or vertigo. No mouth sores or sore throat. · Cardiovascular: see above  · Respiratory: see above  · Gastrointestinal: No abdominal pain, appetite loss, blood in stools. · Genitourinary: No dysuria, trouble voiding, or hematuria. · Musculoskeletal:  No gait disturbance, No weakness or joint complaints. · Integumentary: No rash or pruritis. · Neurological: No headache or diplopia. No tingling  · Psychiatric: No anxiety, or depression. · Endocrine: No temperature intolerance. · Hematologic/Lymphatic: No abnormal bruising or bleeding, blood clots or swollen lymph nodes. · Allergic/Immunologic: No nasal congestion or hives. PHYSICAL EXAM:      Vitals:    06/29/22 1401   BP: (!) 144/76   Pulse: 86       Constitutional and General Appearance: alert, cooperative, no distress and appears stated age  HEENT: PERRL, no cervical lymphadenopathy. No masses palpable. Normal oral mucosa  Respiratory:  · Normal excursion and expansion without use of accessory muscles  · Resp Auscultation: Good respiratory effort. No for increased work of breathing.  On auscultation: clear to auscultation bilaterally  Cardiovascular:  · Heart tones are crisp and normal. regular S1 and S2.  · Jugular venous pulsation Normal  · The carotid upstroke is normal in amplitude and contour without delay or bruit   Abdomen:   · soft  · Bowel sounds present  Extremities:  ·  No edema  Neurological:  · Alert and oriented. Cardiac Data:  EKG: NSR, NL ECG    Labs:     CBC: No results for input(s): WBC, HGB, HCT, PLT in the last 72 hours. BMP: No results for input(s): NA, K, CO2, BUN, CREATININE, LABGLOM, GLUCOSE in the last 72 hours. PT/INR: No results for input(s): PROTIME, INR in the last 72 hours. FASTING LIPID PANEL:  Lab Results   Component Value Date    HDL 52 06/02/2022    LDLCALC 159 05/02/2016    TRIG 133 06/02/2022     LIVER PROFILE:No results for input(s): AST, ALT, LABALBU in the last 72 hours. Problem List:  Patient Active Problem List   Diagnosis    Mitral valve prolapse    [address]ra[address]ricular tachycardia ([facility] [address]    Hypertension    Hyperlipidemia    Allergic rhinitis    Impaired fasting blood sugar    Sigmoid diverticulosis    Obstructive sleep apnea    Cataract, post subcapsular polar senile    Hemangioma of face    Venous malformation    History of colon polyps    Tracheostomy status ([facility] [address]        Assessment and plan:    -H/o SVT 1995- Pre syncope x 3 since March 2019 brief sensation of passing out while sitting. Obtain holter monitor 48 hour. Liberalize po fluid intake 20oz a day. -H/o MVP. I will obtain TTE.  -Hyperlipidemia- Lipid panel 6/2/22 , HDL 52, , . Add lipitor 20mg po qhs. Diet and exercise recommended. -AVM of lower face- s/p sclerotherapy  -S/p tracheostomy  -JOSIE-improved after AVM surgery  -RTC 6 months.     Ivonne Carrera [id number]  [phone]

## 2022-06-30 ENCOUNTER — TELEPHONE (OUTPATIENT)
Dept: CARDIOLOGY | Age: 72
End: 2022-06-30

## 2022-07-18 ENCOUNTER — HOSPITAL ENCOUNTER (OUTPATIENT)
Dept: NON INVASIVE DIAGNOSTICS | Age: 72
Discharge: HOME | End: 2022-07-18
Payer: MEDICARE

## 2022-07-18 DIAGNOSIS — I34.1 MVP (MITRAL VALVE PROLAPSE): ICD-10-CM

## 2022-07-18 DIAGNOSIS — R55 PRE-SYNCOPE: ICD-10-CM

## 2022-07-18 LAB
LV EF: 60 %
LVEF MODALITY: NORMAL

## 2022-07-18 PROCEDURE — 93225 XTRNL ECG REC<48 HRS REC: CPT

## 2022-07-18 PROCEDURE — 93306 TTE W/DOPPLER COMPLETE: CPT

## 2022-07-18 PROCEDURE — 93226 XTRNL ECG REC<48 HR SCAN A/R: CPT

## 2022-07-20 ENCOUNTER — HOSPITAL ENCOUNTER (OUTPATIENT)
Dept: NON INVASIVE DIAGNOSTICS | Age: 72
Discharge: HOME | End: 2022-07-20

## 2022-07-22 DIAGNOSIS — I47.1 PAT (PAROXYSMAL ATRIAL TACHYCARDIA) (HCC): Primary | ICD-10-CM

## 2022-07-22 RX ORDER — METOPROLOL SUCCINATE 25 MG/1
25 TABLET, EXTENDED RELEASE ORAL DAILY
Qty: 30 TABLET | Refills: 5 | Status: SHIPPED | OUTPATIENT
Start: 2022-07-22

## 2022-08-17 ENCOUNTER — TELEPHONE (OUTPATIENT)
Dept: CARDIOLOGY | Age: 72
End: 2022-08-17

## 2022-08-17 NOTE — TELEPHONE ENCOUNTER
Pt dropped off her BP readings for review. States she is feeling better.   Please see scan    Last Appt:  6/29/2022  Next Appt:   1/4/2023  Med verified in [address]

## 2022-09-29 ENCOUNTER — IMMUNIZATION (OUTPATIENT)
Dept: LAB | Age: 72
End: 2022-09-29
Payer: MEDICARE

## 2022-09-29 DIAGNOSIS — Z23 NEED FOR IMMUNIZATION AGAINST INFLUENZA: Primary | ICD-10-CM

## 2022-09-29 PROCEDURE — PBSHW INFLUENZA, FLUAD, (AGE 65 Y+), IM, PF, 0.5 ML: Performed by: FAMILY MEDICINE

## 2022-09-29 PROCEDURE — 90694 VACC AIIV4 NO PRSRV 0.5ML IM: CPT

## 2022-09-29 NOTE — PROGRESS NOTES
Pt presented for high dose flu injection. Fluad Quadrivalant 0.5 ml administered to R deltoid. Pt tolerated well.

## 2022-10-22 ENCOUNTER — OFFICE VISIT (OUTPATIENT)
Dept: PRIMARY CARE CLINIC | Age: 72
End: 2022-10-22
Payer: MEDICARE

## 2022-10-22 VITALS
HEART RATE: 58 BPM | BODY MASS INDEX: 24.94 KG/M2 | TEMPERATURE: 98.1 F | DIASTOLIC BLOOD PRESSURE: 68 MMHG | WEIGHT: 127 LBS | HEIGHT: 60 IN | OXYGEN SATURATION: 96 % | SYSTOLIC BLOOD PRESSURE: 100 MMHG

## 2022-10-22 DIAGNOSIS — S50.851A: ICD-10-CM

## 2022-10-22 DIAGNOSIS — L08.9: ICD-10-CM

## 2022-10-22 DIAGNOSIS — L03.113 CELLULITIS OF RIGHT UPPER EXTREMITY: Primary | ICD-10-CM

## 2022-10-22 PROCEDURE — G8399 PT W/DXA RESULTS DOCUMENT: HCPCS | Performed by: NURSE PRACTITIONER

## 2022-10-22 PROCEDURE — 99212 OFFICE O/P EST SF 10 MIN: CPT | Performed by: NURSE PRACTITIONER

## 2022-10-22 PROCEDURE — G8484 FLU IMMUNIZE NO ADMIN: HCPCS | Performed by: NURSE PRACTITIONER

## 2022-10-22 PROCEDURE — G8427 DOCREV CUR MEDS BY ELIG CLIN: HCPCS | Performed by: NURSE PRACTITIONER

## 2022-10-22 PROCEDURE — 99214 OFFICE O/P EST MOD 30 MIN: CPT | Performed by: NURSE PRACTITIONER

## 2022-10-22 PROCEDURE — 1123F ACP DISCUSS/DSCN MKR DOCD: CPT | Performed by: NURSE PRACTITIONER

## 2022-10-22 PROCEDURE — 1090F PRES/ABSN URINE INCON ASSESS: CPT | Performed by: NURSE PRACTITIONER

## 2022-10-22 PROCEDURE — 3017F COLORECTAL CA SCREEN DOC REV: CPT | Performed by: NURSE PRACTITIONER

## 2022-10-22 PROCEDURE — 1036F TOBACCO NON-USER: CPT | Performed by: NURSE PRACTITIONER

## 2022-10-22 PROCEDURE — G8420 CALC BMI NORM PARAMETERS: HCPCS | Performed by: NURSE PRACTITIONER

## 2022-10-22 RX ORDER — CEPHALEXIN 500 MG/1
500 CAPSULE ORAL 4 TIMES DAILY
Qty: 20 CAPSULE | Refills: 0 | Status: SHIPPED | OUTPATIENT
Start: 2022-10-22 | End: 2022-10-27

## 2022-10-24 ENCOUNTER — OFFICE VISIT (OUTPATIENT)
Dept: PRIMARY CARE CLINIC | Age: 72
End: 2022-10-24
Payer: MEDICARE

## 2022-10-24 VITALS
HEART RATE: 65 BPM | TEMPERATURE: 97 F | RESPIRATION RATE: 14 BRPM | OXYGEN SATURATION: 95 % | DIASTOLIC BLOOD PRESSURE: 70 MMHG | BODY MASS INDEX: 25.21 KG/M2 | HEIGHT: 60 IN | WEIGHT: 128.4 LBS | SYSTOLIC BLOOD PRESSURE: 118 MMHG

## 2022-10-24 DIAGNOSIS — S39.012A STRAIN OF LUMBAR REGION, INITIAL ENCOUNTER: Primary | ICD-10-CM

## 2022-10-24 PROCEDURE — 1036F TOBACCO NON-USER: CPT | Performed by: NURSE PRACTITIONER

## 2022-10-24 PROCEDURE — G8484 FLU IMMUNIZE NO ADMIN: HCPCS | Performed by: NURSE PRACTITIONER

## 2022-10-24 PROCEDURE — 1090F PRES/ABSN URINE INCON ASSESS: CPT | Performed by: NURSE PRACTITIONER

## 2022-10-24 PROCEDURE — 99212 OFFICE O/P EST SF 10 MIN: CPT | Performed by: NURSE PRACTITIONER

## 2022-10-24 PROCEDURE — G8399 PT W/DXA RESULTS DOCUMENT: HCPCS | Performed by: NURSE PRACTITIONER

## 2022-10-24 PROCEDURE — G8417 CALC BMI ABV UP PARAM F/U: HCPCS | Performed by: NURSE PRACTITIONER

## 2022-10-24 PROCEDURE — 1123F ACP DISCUSS/DSCN MKR DOCD: CPT | Performed by: NURSE PRACTITIONER

## 2022-10-24 PROCEDURE — 3017F COLORECTAL CA SCREEN DOC REV: CPT | Performed by: NURSE PRACTITIONER

## 2022-10-24 PROCEDURE — G8427 DOCREV CUR MEDS BY ELIG CLIN: HCPCS | Performed by: NURSE PRACTITIONER

## 2022-10-24 PROCEDURE — 99213 OFFICE O/P EST LOW 20 MIN: CPT | Performed by: NURSE PRACTITIONER

## 2022-10-24 SDOH — ECONOMIC STABILITY: FOOD INSECURITY: WITHIN THE PAST 12 MONTHS, YOU WORRIED THAT YOUR FOOD WOULD RUN OUT BEFORE YOU GOT MONEY TO BUY MORE.: NEVER TRUE

## 2022-10-24 SDOH — ECONOMIC STABILITY: FOOD INSECURITY: WITHIN THE PAST 12 MONTHS, THE FOOD YOU BOUGHT JUST DIDN'T LAST AND YOU DIDN'T HAVE MONEY TO GET MORE.: NEVER TRUE

## 2022-10-24 ASSESSMENT — SOCIAL DETERMINANTS OF HEALTH (SDOH): HOW HARD IS IT FOR YOU TO PAY FOR THE VERY BASICS LIKE FOOD, HOUSING, MEDICAL CARE, AND HEATING?: NOT HARD AT ALL

## 2022-10-24 NOTE — PROGRESS NOTES
Subjective:      Patient ID: Donnie Daley is a 74 y.o. female coming in for   Chief Complaint   Patient presents with    Lower Back Pain     10/23/22- The patient is experiencing pain/aching in their lower back and lower extremities - has taken OTC tylenol with no relief- believes that the may be having an allergic reaction to Cephalexin that was previously prescribed. - They also believe that they may have pulled \"something\" while trying to get the lawnmower started.- Pt. Wants to know if they should continue with medication. HPI  Presents to  for concerns of lower back pain. Pt unsure if keflex that she was put on by myself yesterday is causing her low back pain. Pt had thorn stuck in right wrist for 11 days, I removed foreign body yesterday here in  and placed her on keflex. Pt states she was getting her lawnmower winterized yesterday and pulled the string 6 times and since has had lower back pain that is radiating into her legs. No numbness/tingling, loss of bladder/bowel control, rash or saddle paresthesia. Has been using heating pad and muscle rub with some relief. Review of Systems     Objective:/70 (Site: Left Upper Arm, Position: Sitting, Cuff Size: Medium Adult)   Pulse 65   Temp 97 °F (36.1 °C) (Tympanic)   Resp 14   Ht 5' (1.524 m)   Wt 128 lb 6.4 oz (58.2 kg)   LMP 05/06/2000   SpO2 95%   BMI 25.08 kg/m²      Physical Exam  Vitals and nursing note reviewed. Constitutional:       General: She is not in acute distress. Appearance: Normal appearance. She is not ill-appearing. HENT:      Head: Normocephalic. Pulmonary:      Effort: Pulmonary effort is normal.   Musculoskeletal:      Lumbar back: Spasms and tenderness present. No bony tenderness. Negative right straight leg raise test and negative left straight leg raise test.      Right lower leg: No edema. Left lower leg: No edema. Skin:     General: Skin is warm and dry. Findings: No rash.    Neurological: General: No focal deficit present. Mental Status: She is alert and oriented to person, place, and time. Assessment:      1. Strain of lumbar region, initial encounter           Plan:    -continue with heat/muscle rub  -motrin as needed  -may use chiropractor as needed    No orders of the defined types were placed in this encounter. Outpatient Encounter Medications as of 10/24/2022   Medication Sig Dispense Refill    cephALEXin (KEFLEX) 500 MG capsule Take 1 capsule by mouth 4 times daily for 5 days 20 capsule 0    Handicap Placard MISC by Does not apply route Expires: 08/16/2027 2 each 0    metoprolol succinate (TOPROL XL) 25 MG extended release tablet Take 1 tablet by mouth in the morning. 30 tablet 5    atorvastatin (LIPITOR) 20 MG tablet Take 1 tablet by mouth daily 30 tablet 5    Calcium Carbonate-Vit D-Min (CALTRATE PLUS PO) Take by mouth Chocolate chews      docusate sodium (COLACE) 100 MG capsule Take 100 mg by mouth as needed for Constipation      [DISCONTINUED] psyllium (KONSYL) 28.3 % PACK Take by mouth daily 1 teaspoon daily (Patient not taking: Reported on 10/22/2022)       No facility-administered encounter medications on file as of 10/24/2022.             Donalda Brittle, APRN - CNP

## 2022-12-05 ENCOUNTER — HOSPITAL ENCOUNTER (OUTPATIENT)
Age: 72
Discharge: HOME | End: 2022-12-05
Payer: MEDICARE

## 2022-12-05 DIAGNOSIS — R73.01 IMPAIRED FASTING BLOOD SUGAR: ICD-10-CM

## 2022-12-05 DIAGNOSIS — I10 ESSENTIAL HYPERTENSION: ICD-10-CM

## 2022-12-05 DIAGNOSIS — E78.00 PURE HYPERCHOLESTEROLEMIA: ICD-10-CM

## 2022-12-05 LAB
ABSOLUTE EOS #: 0.16 K/UL (ref 0–0.44)
ABSOLUTE IMMATURE GRANULOCYTE: 0.03 K/UL (ref 0–0.3)
ABSOLUTE LYMPH #: 1.93 K/UL (ref 1.1–3.7)
ABSOLUTE MONO #: 0.57 K/UL (ref 0.1–1.2)
ALBUMIN SERPL-MCNC: 4 G/DL (ref 3.5–5.2)
ALBUMIN/GLOBULIN RATIO: 1.2 (ref 1–2.5)
ALP BLD-CCNC: 78 U/L (ref 35–104)
ALT SERPL-CCNC: 14 U/L (ref 5–33)
ANION GAP SERPL CALCULATED.3IONS-SCNC: 12 MMOL/L (ref 9–17)
AST SERPL-CCNC: 16 U/L
BASOPHILS # BLD: 1 % (ref 0–2)
BASOPHILS ABSOLUTE: 0.05 K/UL (ref 0–0.2)
BILIRUB SERPL-MCNC: 0.4 MG/DL (ref 0.3–1.2)
BUN BLDV-MCNC: 16 MG/DL (ref 8–23)
BUN/CREAT BLD: 23 (ref 9–20)
CALCIUM SERPL-MCNC: 9 MG/DL (ref 8.6–10.4)
CHLORIDE BLD-SCNC: 105 MMOL/L (ref 98–107)
CHOLESTEROL/HDL RATIO: 5
CHOLESTEROL: 216 MG/DL
CO2: 28 MMOL/L (ref 20–31)
CREAT SERPL-MCNC: 0.71 MG/DL (ref 0.5–0.9)
EOSINOPHILS RELATIVE PERCENT: 3 % (ref 1–4)
ESTIMATED AVERAGE GLUCOSE: 123 MG/DL
GFR SERPL CREATININE-BSD FRML MDRD: >60 ML/MIN/1.73M2
GLUCOSE BLD-MCNC: 103 MG/DL (ref 70–99)
HBA1C MFR BLD: 5.9 % (ref 4–6)
HCT VFR BLD CALC: 41.4 % (ref 36.3–47.1)
HDLC SERPL-MCNC: 43 MG/DL
HEMOGLOBIN: 13.4 G/DL (ref 11.9–15.1)
IMMATURE GRANULOCYTES: 1 %
LDL CHOLESTEROL: 144 MG/DL (ref 0–130)
LYMPHOCYTES # BLD: 32 % (ref 24–43)
MCH RBC QN AUTO: 30.3 PG (ref 25.2–33.5)
MCHC RBC AUTO-ENTMCNC: 32.4 G/DL (ref 25.2–33.5)
MCV RBC AUTO: 93.7 FL (ref 82.6–102.9)
MONOCYTES # BLD: 10 % (ref 3–12)
NRBC AUTOMATED: 0 PER 100 WBC
PDW BLD-RTO: 13 % (ref 11.8–14.4)
PLATELET # BLD: 212 K/UL (ref 138–453)
PMV BLD AUTO: 9.6 FL (ref 8.1–13.5)
POTASSIUM SERPL-SCNC: 4.3 MMOL/L (ref 3.7–5.3)
RBC # BLD: 4.42 M/UL (ref 3.95–5.11)
SEG NEUTROPHILS: 53 % (ref 36–65)
SEGMENTED NEUTROPHILS ABSOLUTE COUNT: 3.22 K/UL (ref 1.5–8.1)
SODIUM BLD-SCNC: 145 MMOL/L (ref 135–144)
TOTAL PROTEIN: 7.3 G/DL (ref 6.4–8.3)
TRIGL SERPL-MCNC: 143 MG/DL
WBC # BLD: 6 K/UL (ref 3.5–11.3)

## 2022-12-05 PROCEDURE — 36415 COLL VENOUS BLD VENIPUNCTURE: CPT

## 2022-12-05 PROCEDURE — 83036 HEMOGLOBIN GLYCOSYLATED A1C: CPT

## 2022-12-05 PROCEDURE — 80053 COMPREHEN METABOLIC PANEL: CPT

## 2022-12-05 PROCEDURE — 80061 LIPID PANEL: CPT

## 2022-12-05 PROCEDURE — 85025 COMPLETE CBC W/AUTO DIFF WBC: CPT

## 2022-12-13 ENCOUNTER — OFFICE VISIT (OUTPATIENT)
Dept: FAMILY MEDICINE CLINIC | Age: 72
End: 2022-12-13
Payer: MEDICARE

## 2022-12-13 VITALS
BODY MASS INDEX: 24.35 KG/M2 | HEART RATE: 68 BPM | HEIGHT: 61 IN | DIASTOLIC BLOOD PRESSURE: 70 MMHG | SYSTOLIC BLOOD PRESSURE: 120 MMHG | WEIGHT: 129 LBS

## 2022-12-13 DIAGNOSIS — Q27.39 ARTERIOVENOUS MALFORMATION, OTHER SITE: ICD-10-CM

## 2022-12-13 DIAGNOSIS — R73.01 IMPAIRED FASTING BLOOD SUGAR: ICD-10-CM

## 2022-12-13 DIAGNOSIS — J30.1 SEASONAL ALLERGIC RHINITIS DUE TO POLLEN: ICD-10-CM

## 2022-12-13 DIAGNOSIS — M54.50 CHRONIC BILATERAL LOW BACK PAIN WITHOUT SCIATICA: ICD-10-CM

## 2022-12-13 DIAGNOSIS — I47.1 SUPRAVENTRICULAR TACHYCARDIA (HCC): Primary | ICD-10-CM

## 2022-12-13 DIAGNOSIS — Z12.11 COLON CANCER SCREENING: ICD-10-CM

## 2022-12-13 DIAGNOSIS — I47.1 PAT (PAROXYSMAL ATRIAL TACHYCARDIA) (HCC): ICD-10-CM

## 2022-12-13 DIAGNOSIS — K57.30 SIGMOID DIVERTICULOSIS: ICD-10-CM

## 2022-12-13 DIAGNOSIS — R42 DIZZINESS: ICD-10-CM

## 2022-12-13 DIAGNOSIS — I10 ESSENTIAL HYPERTENSION: ICD-10-CM

## 2022-12-13 DIAGNOSIS — E78.5 HYPERLIPIDEMIA, UNSPECIFIED HYPERLIPIDEMIA TYPE: ICD-10-CM

## 2022-12-13 DIAGNOSIS — G89.29 CHRONIC BILATERAL LOW BACK PAIN WITHOUT SCIATICA: ICD-10-CM

## 2022-12-13 PROCEDURE — G8420 CALC BMI NORM PARAMETERS: HCPCS | Performed by: FAMILY MEDICINE

## 2022-12-13 PROCEDURE — 3078F DIAST BP <80 MM HG: CPT | Performed by: FAMILY MEDICINE

## 2022-12-13 PROCEDURE — 1090F PRES/ABSN URINE INCON ASSESS: CPT | Performed by: FAMILY MEDICINE

## 2022-12-13 PROCEDURE — 1036F TOBACCO NON-USER: CPT | Performed by: FAMILY MEDICINE

## 2022-12-13 PROCEDURE — G8399 PT W/DXA RESULTS DOCUMENT: HCPCS | Performed by: FAMILY MEDICINE

## 2022-12-13 PROCEDURE — 1123F ACP DISCUSS/DSCN MKR DOCD: CPT | Performed by: FAMILY MEDICINE

## 2022-12-13 PROCEDURE — 3074F SYST BP LT 130 MM HG: CPT | Performed by: FAMILY MEDICINE

## 2022-12-13 PROCEDURE — G8484 FLU IMMUNIZE NO ADMIN: HCPCS | Performed by: FAMILY MEDICINE

## 2022-12-13 PROCEDURE — 99213 OFFICE O/P EST LOW 20 MIN: CPT | Performed by: FAMILY MEDICINE

## 2022-12-13 PROCEDURE — G8427 DOCREV CUR MEDS BY ELIG CLIN: HCPCS | Performed by: FAMILY MEDICINE

## 2022-12-13 PROCEDURE — 99214 OFFICE O/P EST MOD 30 MIN: CPT | Performed by: FAMILY MEDICINE

## 2022-12-13 PROCEDURE — 3017F COLORECTAL CA SCREEN DOC REV: CPT | Performed by: FAMILY MEDICINE

## 2022-12-13 RX ORDER — METOPROLOL SUCCINATE 25 MG/1
25 TABLET, EXTENDED RELEASE ORAL DAILY
Qty: 30 TABLET | Refills: 5 | Status: SHIPPED | OUTPATIENT
Start: 2022-12-13

## 2022-12-13 RX ORDER — ATORVASTATIN CALCIUM 20 MG/1
20 TABLET, FILM COATED ORAL DAILY
Qty: 30 TABLET | Refills: 5 | Status: SHIPPED | OUTPATIENT
Start: 2022-12-13

## 2022-12-13 ASSESSMENT — ENCOUNTER SYMPTOMS
CONSTIPATION: 1
RESPIRATORY NEGATIVE: 1
RHINORRHEA: 1
EYES NEGATIVE: 1

## 2022-12-13 ASSESSMENT — PATIENT HEALTH QUESTIONNAIRE - PHQ9
1. LITTLE INTEREST OR PLEASURE IN DOING THINGS: 0
SUM OF ALL RESPONSES TO PHQ QUESTIONS 1-9: 0
2. FEELING DOWN, DEPRESSED OR HOPELESS: 0
SUM OF ALL RESPONSES TO PHQ QUESTIONS 1-9: 0
SUM OF ALL RESPONSES TO PHQ9 QUESTIONS 1 & 2: 0

## 2022-12-13 NOTE — PROGRESS NOTES
General Surgery Week 1 Survey      Responses   [facility] patient discharged from?  [place]   Does the patient have one of the following disease processes/diagnoses(primary or secondary)?  General Surgery   Week 1 attempt successful?  No   Unsuccessful attempts  Attempt 1          Bijal Riggs RN   Subjective:      Patient ID: Naomi Whatley is a 74 y.o. female. Hypertension    Hyperlipidemia     Routine follow up on chronic medical conditions, refills, and review of updated labs. I have reviewed the patient's medical history in detail and updated the computerized patient record. She is feeling fairly well at present. She has a new white and yellow kitten since last visit. \"Whit\". Keeping her busy . 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 5 sclerotherapy treatments. Some reconstruction surgery of tracheostomy closure and lower left lip. She has completed procedures and is now working with dentist.  She needs some bone addition to mandibular area by description. Planning bracing and other dental work after that. invisiline braces being used at present. Night retainers in the future. She did have some bone graft under 6 of her mandibular incisors area by report. Has healed well. Allergies fairly mild at present. Intermittent rhinorrhea at present. Starting claritin daily. No significant gerd symptoms. Non compliant with cpap. She thinks interventions have improved this condition. No palpitations of concern. No bladder issues. Using prunes to help with constipation. Working well for her. Increased gas/bloating/flatulence at present. She feels she has likely gained some wt. During covid restrictions. No covid concerns at present. Staying home for the most part. Past Medical History:   Diagnosis Date    Allergic rhinitis     GERD (gastroesophageal reflux disease)     Hemangioma of face     And arm. Impaired fasting blood sugar     Internal hemorrhoids     Irritable bowel syndrome     Menometrorrhagia     Severe.     Mitral valve prolapse     Obstructive sleep apnea     non compliant with cpap    Osteoarthritis     Sigmoid diverticulosis     Supraventricular tachycardia [facility])      Past Surgical History:   Procedure Laterality Date    BREAST BIOPSY Right 11/18/1987    excisional biopsy    CATARACT REMOVAL WITH IMPLANT Left 11/08/2016    CATARACT REMOVAL WITH IMPLANT  12/06/2016    CHOLECYSTECTOMY  03/20/2000    COLONOSCOPY  06/12/2009    Showed sigmoid diverticulosis and internal hemorrhoids. COLONOSCOPY  11/19/2014    diverticulosis, rectal polyp hyperplastic    COLONOSCOPY N/A 7/22/2021    COLONOSCOPY HOT BIOPSY performed by Cruz Alfaro DO at [facility] [id number]  01/08/2018    HYSTERECTOMY (CERVIX STATUS UNKNOWN)  05/01/2000    MOUTH SURGERY      OTHER SURGICAL HISTORY      Scleratherapy-tongue, bottom lip and chin    OVARY REMOVAL Bilateral 05/2000    With leiomyoma noted. TONSILLECTOMY      TRACHEOSTOMY  11/14/2017    UPPER GASTROINTESTINAL ENDOSCOPY  01/31/2014    multiple fundic gland polyps benign. hiatal hernia, hemangioma of the tongue and base of the tongue and hypopharnx     Current Outpatient Medications   Medication Sig Dispense Refill    metoprolol succinate (TOPROL XL) 25 MG extended release tablet Take 1 tablet by mouth in the morning. 30 tablet 5    atorvastatin (LIPITOR) 20 MG tablet Take 1 tablet by mouth daily 30 tablet 5    Calcium Carbonate-Vit D-Min (CALTRATE PLUS PO) Take by mouth Chocolate chews      docusate sodium (COLACE) 100 MG capsule Take 100 mg by mouth as needed for Constipation       No current facility-administered medications for this visit. Allergies   Allergen Reactions    Sulfa Antibiotics Swelling    Ciprofloxacin      Does not set well with taste d/t g-tube placement    E-Mycin [Erythromycin] Nausea And Vomiting    Penicillins      Childhood. Review of Systems   Constitutional: Negative. HENT:  Positive for congestion, rhinorrhea and sneezing. Eyes: Negative. Respiratory: Negative. Cardiovascular: Negative. Gastrointestinal:  Positive for constipation. Endocrine: Negative. Genitourinary: Negative. Musculoskeletal: Negative. Skin: Negative. Allergic/Immunologic: Positive for environmental allergies. Neurological: Negative. Hematological: Negative. Psychiatric/Behavioral: Negative. Objective:   Physical Exam  Constitutional:       General: She is not in acute distress. HENT:      Head: Normocephalic and atraumatic. Nose: Nose normal.   Eyes:      Pupils: Pupils are equal, round, and reactive to light. Cardiovascular:      Rate and Rhythm: Normal rate. Pulmonary:      Effort: Pulmonary effort is normal. No respiratory distress. Abdominal:      General: There is no distension. Musculoskeletal:         General: Normal range of motion. Skin:     General: Skin is warm. Findings: No rash. Neurological:      Mental Status: She is alert. Mental status is at baseline. Psychiatric:         Mood and Affect: Mood normal.         Behavior: Behavior normal.         Thought Content:  Thought content normal.         Judgment: Judgment normal.     /70 (Site: Right Upper Arm, Position: Sitting, Cuff Size: Large Adult)   Pulse 68   Ht 5' 1\" (1.549 m)   Wt 129 lb (58.5 kg)   LMP 05/06/2000   BMI 24.37 kg/m²     Hospital Outpatient Visit on 12/05/2022   Component Date Value Ref Range Status    Hemoglobin A1C 12/05/2022 5.9  4.0 - 6.0 % Final    Estimated Avg Glucose 12/05/2022 123  mg/dL Final    WBC 12/05/2022 6.0  3.5 - 11.3 k/uL Final    RBC 12/05/2022 4.42  3.95 - 5.11 m/uL Final    Hemoglobin 12/05/2022 13.4  11.9 - 15.1 g/dL Final    Hematocrit 12/05/2022 41.4  36.3 - 47.1 % Final    MCV 12/05/2022 93.7  82.6 - 102.9 fL Final    MCH 12/05/2022 30.3  25.2 - 33.5 pg Final    MCHC 12/05/2022 32.4  25.2 - 33.5 g/dL Final    RDW 12/05/2022 13.0  11.8 - 14.4 % Final    Platelets 18/43/6404 212  138 - 453 k/uL Final    MPV 12/05/2022 9.6  8.1 - 13.5 fL Final    NRBC Automated 12/05/2022 0.0  0.0 per 100 WBC Final    Seg Neutrophils 12/05/2022 53  36 - 65 % Final    Lymphocytes 12/05/2022 32  24 - 43 % Final    Monocytes 12/05/2022 10  3 - 12 % Final    Eosinophils % 12/05/2022 3  1 - 4 % Final    Basophils 12/05/2022 1  0 - 2 % Final    Immature Granulocytes 12/05/2022 1 (A)  0 % Final    Segs Absolute 12/05/2022 3.22  1.50 - 8.10 k/uL Final    Absolute Lymph # 12/05/2022 1.93  1.10 - 3.70 k/uL Final    Absolute Mono # 12/05/2022 0.57  0.10 - 1.20 k/uL Final    Absolute Eos # 12/05/2022 0.16  0.00 - 0.44 k/uL Final    Basophils Absolute 12/05/2022 0.05  0.00 - 0.20 k/uL Final    Absolute Immature Granulocyte 12/05/2022 0.03  0.00 - 0.30 k/uL Final    Glucose 12/05/2022 103 (A)  70 - 99 mg/dL Final    BUN 12/05/2022 16  8 - 23 mg/dL Final    Creatinine 12/05/2022 0.71  0.50 - 0.90 mg/dL Final    Est, Glom Filt Rate 12/05/2022 >60  >60 mL/min/1.73m2 Final    Bun/Cre Ratio 12/05/2022 23 (A)  9 - 20 Final    Calcium 12/05/2022 9.0  8.6 - 10.4 mg/dL Final    Sodium 12/05/2022 145 (A)  135 - 144 mmol/L Final    Potassium 12/05/2022 4.3  3.7 - 5.3 mmol/L Final    Chloride 12/05/2022 105  98 - 107 mmol/L Final    CO2 12/05/2022 28  20 - 31 mmol/L Final    Anion Gap 12/05/2022 12  9 - 17 mmol/L Final    Alkaline Phosphatase 12/05/2022 78  35 - 104 U/L Final    ALT 12/05/2022 14  5 - 33 U/L Final    AST 12/05/2022 16  <32 U/L Final    Total Bilirubin 12/05/2022 0.4  0.3 - 1.2 mg/dL Final    Total Protein 12/05/2022 7.3  6.4 - 8.3 g/dL Final    Albumin 12/05/2022 4.0  3.5 - 5.2 g/dL Final    Albumin/Globulin Ratio 12/05/2022 1.2  1.0 - 2.5 Final    Cholesterol 12/05/2022 216 (A)  <200 mg/dL Final    HDL 12/05/2022 43  >40 mg/dL Final    LDL Cholesterol 12/05/2022 144 (A)  0 - 130 mg/dL Final    Chol/HDL Ratio 12/05/2022 5.0 (A)  <5 Final    Triglycerides 12/05/2022 143  <150 mg/dL Final       Assessment:          Encounter Diagnoses   Name Primary?     Supraventricular tachycardia (HCC) Yes    PAT (paroxysmal atrial tachycardia) (HCC)     Essential hypertension     Hyperlipidemia, unspecified hyperlipidemia type     Seasonal allergic rhinitis due to pollen     Impaired fasting blood sugar     Sigmoid diverticulosis     Chronic bilateral low back pain without sciatica     Arteriovenous malformation, other site     Colon cancer screening     Dizziness        Plan:       SVT: no concerns at present. Unaware of any recurrent episodes    Dizziness : very vague on review. chiropractor thought more bppv, I thought more orthostatic in the past. No concerning symptoms on review. Opting to observe. No significant issues over the interval.      Htn: no active treatment at present. Cont. Observation, salt avoidance, monitoring    Hyperlipidemia: variable numbers, ldl 126 to 182.  144 at present. Not interested in treatment at present. Allergies: mild symptoms at present. Has prn meds if needed. IFBS : mild/stable. Cont. Dietary discretion. a1c 5.9%. Diverticulosis; no symptoms long term. Fiber supplement recommended. Some constipation, doing better on colace , considering metamucil. Some increased gas and bloating at present. She plans to trial the beeno, plus mylicon  Or gas-X as needed. Considering trial of pro biotics. Low back pain. Mild/chronic. Not bothersome at present. New mattress in the last year. danielito: 2009 sleep study showing frequent danielito with desats. She did start cpap, but ultimately did not tolerate it. She felt too warm and pressure seemed too high by description. She tried different masks and ultimately stopped using. She had restarted cpap and seems to be tolerating it ok. She notes some perceived improvement in sleep/drowsiness issues now. Not compliant at present. Still with mask problems due to her anatomy. Denies drowsiness. She feels ablation helped this issue. Hemangioma of face and tongue. Some suggestion of progression with more fullness in the mouth and left cheek, more difficulty. She has had some bleeding from the right side of the tongue near the tip.   She has had 5 sclerotherapy procedures in [facility] and several small surgeries. Doing better at present . She notices the tongue and cheek are smaller. Speech is easier for others to understand. Tracheostomy has healed well. She had a 3 layer closure. She has had multiple reconstructive surgeries on the lower lip area. Much different appearance for her, doing well. Wearing invisiline braces at present. Bone grafting used to fill in the mandible below her incisors by report. Healed well. Completed colonoscopy 7/22/21: 2 polyps hyperplastic. Mild sigmoid diverticulosis. 5 yr follow up due to polyp history. Near syncope episode watching tv  She was bending over to eat, then sat up. She describes another sensation in the chest, very brief/gone. Hard to describe. Near syncope episode. Probable premature beat felt in the chest.  Has not had syncope. Episodes fairly acute/brief, few seconds. Possible pvc. Sometimes bending over and standing up leads to dizziness. Discussed mechanism and how to avoid . Flu vaccine completed this fall.

## 2022-12-13 NOTE — PATIENT INSTRUCTIONS
Hospital Outpatient Visit on 12/05/2022   Component Date Value Ref Range Status    Hemoglobin A1C 12/05/2022 5.9  4.0 - 6.0 % Final    Estimated Avg Glucose 12/05/2022 123  mg/dL Final    Comment: The ADA and AACC recommend providing the estimated average glucose result to permit better   patient understanding of their HBA1c result. WBC 12/05/2022 6.0  3.5 - 11.3 k/uL Final    RBC 12/05/2022 4.42  3.95 - 5.11 m/uL Final    Hemoglobin 12/05/2022 13.4  11.9 - 15.1 g/dL Final    Hematocrit 12/05/2022 41.4  36.3 - 47.1 % Final    MCV 12/05/2022 93.7  82.6 - 102.9 fL Final    MCH 12/05/2022 30.3  25.2 - 33.5 pg Final    MCHC 12/05/2022 32.4  25.2 - 33.5 g/dL Final    RDW 12/05/2022 13.0  11.8 - 14.4 % Final    Platelets 27/63/3917 212  138 - 453 k/uL Final    MPV 12/05/2022 9.6  8.1 - 13.5 fL Final    NRBC Automated 12/05/2022 0.0  0.0 per 100 WBC Final    Seg Neutrophils 12/05/2022 53  36 - 65 % Final    Lymphocytes 12/05/2022 32  24 - 43 % Final    Monocytes 12/05/2022 10  3 - 12 % Final    Eosinophils % 12/05/2022 3  1 - 4 % Final    Basophils 12/05/2022 1  0 - 2 % Final    Immature Granulocytes 12/05/2022 1 (A)  0 % Final    Segs Absolute 12/05/2022 3.22  1.50 - 8.10 k/uL Final    Absolute Lymph # 12/05/2022 1.93  1.10 - 3.70 k/uL Final    Absolute Mono # 12/05/2022 0.57  0.10 - 1.20 k/uL Final    Absolute Eos # 12/05/2022 0.16  0.00 - 0.44 k/uL Final    Basophils Absolute 12/05/2022 0.05  0.00 - 0.20 k/uL Final    Absolute Immature Granulocyte 12/05/2022 0.03  0.00 - 0.30 k/uL Final    Glucose 12/05/2022 103 (A)  70 - 99 mg/dL Final    BUN 12/05/2022 16  8 - 23 mg/dL Final    Creatinine 12/05/2022 0.71  0.50 - 0.90 mg/dL Final    Est, Glom Filt Rate 12/05/2022 >60  >60 mL/min/1.73m2 Final    Comment:       Effective Oct 3, 2022        These results are not intended for use in patients <25years of age. eGFR results are calculated without a race factor using the 2021 CKD-EPI equation.   Careful clinical

## 2022-12-21 ENCOUNTER — TELEMEDICINE (OUTPATIENT)
Dept: FAMILY MEDICINE CLINIC | Age: 72
End: 2022-12-21
Payer: MEDICARE

## 2022-12-21 DIAGNOSIS — Z00.00 INITIAL MEDICARE ANNUAL WELLNESS VISIT: Primary | ICD-10-CM

## 2022-12-21 PROCEDURE — G8484 FLU IMMUNIZE NO ADMIN: HCPCS | Performed by: FAMILY MEDICINE

## 2022-12-21 PROCEDURE — G0438 PPPS, INITIAL VISIT: HCPCS | Performed by: FAMILY MEDICINE

## 2022-12-21 PROCEDURE — 3017F COLORECTAL CA SCREEN DOC REV: CPT | Performed by: FAMILY MEDICINE

## 2022-12-21 PROCEDURE — 1123F ACP DISCUSS/DSCN MKR DOCD: CPT | Performed by: FAMILY MEDICINE

## 2022-12-21 ASSESSMENT — PATIENT HEALTH QUESTIONNAIRE - PHQ9
SUM OF ALL RESPONSES TO PHQ QUESTIONS 1-9: 0
1. LITTLE INTEREST OR PLEASURE IN DOING THINGS: 0
SUM OF ALL RESPONSES TO PHQ QUESTIONS 1-9: 0
2. FEELING DOWN, DEPRESSED OR HOPELESS: 0
SUM OF ALL RESPONSES TO PHQ9 QUESTIONS 1 & 2: 0

## 2022-12-21 ASSESSMENT — LIFESTYLE VARIABLES
HOW MANY STANDARD DRINKS CONTAINING ALCOHOL DO YOU HAVE ON A TYPICAL DAY: PATIENT DOES NOT DRINK
HOW OFTEN DO YOU HAVE A DRINK CONTAINING ALCOHOL: NEVER

## 2022-12-21 NOTE — PROGRESS NOTES
Medicare Annual Wellness Visit    Derick Lawler is here for Medicare AWV    Assessment & Plan    Recommendations for Preventive Services Due: see orders and patient instructions/AVS.  Recommended screening schedule for the next 5-10 years is provided to the patient in written form: see Patient Instructions/AVS.     No follow-ups on file. Subjective       Patient's complete Health Risk Assessment and screening values have been reviewed and are found in Flowsheets. The following problems were reviewed today and where indicated follow up appointments were made and/or referrals ordered. Positive Risk Factor Screenings with Interventions:                 Weight and Activity:  Physical Activity: Inactive    Days of Exercise per Week: 0 days    Minutes of Exercise per Session: 0 min     On average, how many days per week do you engage in moderate to strenuous exercise (like a brisk walk)?: 0 days  Have you lost any weight without trying in the past 3 months?: No         Inactivity Interventions:  Patient declined any further interventions or treatment         Safety:  Do you have any tripping hazards - loose or unsecured carpets or rugs?: (!) Yes    Interventions:  Patient declined any further interventions or treatment                     Objective      Patient-Reported Vitals  BP Observations: No, remote/electronic monitoring device was not used or able to be verified  Patient-Reported Weight: 128lbs  Patient-Reported Height: 5 0            Allergies   Allergen Reactions    Sulfa Antibiotics Swelling    Ciprofloxacin      Does not set well with taste d/t g-tube placement    E-Mycin [Erythromycin] Nausea And Vomiting    Penicillins      Childhood. Prior to Visit Medications    Medication Sig Taking?  Authorizing Provider   metoprolol succinate (TOPROL XL) 25 MG extended release tablet Take 1 tablet by mouth daily Yes Enedina Burns MD   Calcium Carbonate-Vit D-Min (CALTRATE PLUS PO) Take by mouth Chocolate chews Yes Historical Provider, MD   docusate sodium (COLACE) 100 MG capsule Take 100 mg by mouth as needed for Constipation Yes Historical Provider, MD   atorvastatin (LIPITOR) 20 MG tablet Take 1 tablet by mouth daily  Patient not taking: Reported on 12/21/2022  MD Luis Rojas (Including outside providers/suppliers regularly involved in providing care):   Patient Care Team:  Wu Coronel MD as PCP - General (Family Medicine)  Wu Coronel MD [facility]     Reviewed and updated this visit:  Tobacco  Allergies  Meds  Med Hx  Surg Hx  Soc Hx  Fam Hx        I, Blank Castillo LPN, 89/19/1320, performed the documented evaluation under the direct supervision of the attending physician. Janice Reed, was evaluated through a synchronous (real-time) audio encounter. The patient (or guardian if applicable) is aware that this is a billable service, which includes applicable co-pays. This Virtual Visit was conducted with patient's (and/or legal guardian's) consent. The visit was conducted pursuant to the emergency declaration under the [facility] [place], [address] [place] [address] waiver authority and the [employer] General Act. Patient identification was verified, and a caregiver was present when appropriate. The patient was located at Home: [address] [place] [address]. Provider was located at [facility] ([address] [place] [address]): [address] [place] [address]. This encounter was performed under my, Juan Rojas, direct supervision, 12/21/2022.   Electronically signed by Wu Coronel MD on 12/27/2022 at 3:36 PM

## 2022-12-21 NOTE — PATIENT INSTRUCTIONS
Personalized Preventive Plan for Zuly Romero - 12/21/2022  Medicare offers a range of preventive health benefits. Some of the tests and screenings are paid in full while other may be subject to a deductible, co-insurance, and/or copay. Some of these benefits include a comprehensive review of your medical history including lifestyle, illnesses that may run in your family, and various assessments and screenings as appropriate. After reviewing your medical record and screening and assessments performed today your provider may have ordered immunizations, labs, imaging, and/or referrals for you. A list of these orders (if applicable) as well as your Preventive Care list are included within your After Visit Summary for your review. Other Preventive Recommendations:    A preventive eye exam performed by an eye specialist is recommended every 1-2 years to screen for glaucoma; cataracts, macular degeneration, and other eye disorders. A preventive dental visit is recommended every 6 months. Try to get at least 150 minutes of exercise per week or 10,000 steps per day on a pedometer . Order or download the FREE \"Exercise & Physical Activity: Your Everyday Guide\" from [employer]. Call 1-210.349.5712 or search [employer] online. You need 8314-8551 mg of calcium and 3198-6104 IU of vitamin D per day. It is possible to meet your calcium requirement with diet alone, but a vitamin D supplement is usually necessary to meet this goal.  When exposed to the sun, use a sunscreen that protects against both UVA and UVB radiation with an SPF of 30 or greater. Reapply every 2 to 3 hours or after sweating, drying off with a towel, or swimming. Always wear a seat belt when traveling in a car. Always wear a helmet when riding a bicycle or motorcycle.

## 2023-01-04 ENCOUNTER — OFFICE VISIT (OUTPATIENT)
Dept: CARDIOLOGY | Age: 73
End: 2023-01-04
Payer: MEDICARE

## 2023-01-04 VITALS
WEIGHT: 129.6 LBS | DIASTOLIC BLOOD PRESSURE: 62 MMHG | BODY MASS INDEX: 24.49 KG/M2 | HEART RATE: 78 BPM | SYSTOLIC BLOOD PRESSURE: 130 MMHG

## 2023-01-04 DIAGNOSIS — I34.1 MVP (MITRAL VALVE PROLAPSE): ICD-10-CM

## 2023-01-04 DIAGNOSIS — I47.1 SVT (SUPRAVENTRICULAR TACHYCARDIA) (HCC): ICD-10-CM

## 2023-01-04 DIAGNOSIS — I47.1 PAT (PAROXYSMAL ATRIAL TACHYCARDIA) (HCC): ICD-10-CM

## 2023-01-04 DIAGNOSIS — R55 PRE-SYNCOPE: Primary | ICD-10-CM

## 2023-01-04 PROCEDURE — 3075F SYST BP GE 130 - 139MM HG: CPT | Performed by: NURSE PRACTITIONER

## 2023-01-04 PROCEDURE — 3078F DIAST BP <80 MM HG: CPT | Performed by: NURSE PRACTITIONER

## 2023-01-04 PROCEDURE — 99214 OFFICE O/P EST MOD 30 MIN: CPT | Performed by: NURSE PRACTITIONER

## 2023-01-04 PROCEDURE — G8399 PT W/DXA RESULTS DOCUMENT: HCPCS | Performed by: NURSE PRACTITIONER

## 2023-01-04 PROCEDURE — 3017F COLORECTAL CA SCREEN DOC REV: CPT | Performed by: NURSE PRACTITIONER

## 2023-01-04 PROCEDURE — 1123F ACP DISCUSS/DSCN MKR DOCD: CPT | Performed by: NURSE PRACTITIONER

## 2023-01-04 PROCEDURE — 93005 ELECTROCARDIOGRAM TRACING: CPT | Performed by: NURSE PRACTITIONER

## 2023-01-04 PROCEDURE — 1036F TOBACCO NON-USER: CPT | Performed by: NURSE PRACTITIONER

## 2023-01-04 PROCEDURE — 93010 ELECTROCARDIOGRAM REPORT: CPT | Performed by: NURSE PRACTITIONER

## 2023-01-04 PROCEDURE — G8420 CALC BMI NORM PARAMETERS: HCPCS | Performed by: NURSE PRACTITIONER

## 2023-01-04 PROCEDURE — G8427 DOCREV CUR MEDS BY ELIG CLIN: HCPCS | Performed by: NURSE PRACTITIONER

## 2023-01-04 PROCEDURE — G8484 FLU IMMUNIZE NO ADMIN: HCPCS | Performed by: NURSE PRACTITIONER

## 2023-01-04 PROCEDURE — 1090F PRES/ABSN URINE INCON ASSESS: CPT | Performed by: NURSE PRACTITIONER

## 2023-01-04 RX ORDER — METOPROLOL SUCCINATE 25 MG/1
25 TABLET, EXTENDED RELEASE ORAL DAILY
Qty: 90 TABLET | Refills: 5 | Status: SHIPPED | OUTPATIENT
Start: 2023-01-04

## 2023-01-04 NOTE — PROGRESS NOTES
Today's Date: 1/4/2023  Patient's Name: Fannie Jiang  Patient's age: 74 y.o., 1950    Subjective:  Fannie Jiang is being seen in clinic today regarding Palpitations, h/o SVT    she states that she continues to have episodes of near syncope that are very quick. She states that mostly occur while she is sitting. She states she does have palpitations. She is struggling to increase her fluids. Past Medical History:   has a past medical history of Allergic rhinitis, GERD (gastroesophageal reflux disease), Hemangioma of face, Impaired fasting blood sugar, Internal hemorrhoids, Irritable bowel syndrome, Menometrorrhagia, Mitral valve prolapse, Obstructive sleep apnea, Osteoarthritis, Sigmoid diverticulosis, and Supraventricular tachycardia ([facility] [address] Past Surgical History:   has a past surgical history that includes Cholecystectomy (03/20/2000); Mouth surgery; Tonsillectomy; Upper gastrointestinal endoscopy (01/31/2014); Colonoscopy (06/12/2009); Colonoscopy (11/19/2014); Cataract removal with implant (Left, 11/08/2016); Cataract removal with implant (12/06/2016); tracheostomy (11/14/2017); Gastrostomy tube placement (01/08/2018); other surgical history; Breast biopsy (Right, 11/18/1987); Hysterectomy (05/01/2000); Ovary removal (Bilateral, 05/2000); and Colonoscopy (N/A, 7/22/2021). Home Medications:  Prior to Admission medications    Medication Sig Start Date End Date Taking?  Authorizing Provider   metoprolol succinate (TOPROL XL) 25 MG extended release tablet Take 1 tablet by mouth daily 12/13/22   Ishmael Ward MD   atorvastatin (LIPITOR) 20 MG tablet Take 1 tablet by mouth daily  Patient not taking: Reported on 12/21/2022 12/13/22   Ishmael Ward MD   Calcium Carbonate-Vit D-Min (CALTRATE PLUS PO) Take by mouth Chocolate chews    Historical Provider, MD   docusate sodium (COLACE) 100 MG capsule Take 100 mg by mouth as needed for Constipation    Historical Provider, MD Allergies:  Sulfa antibiotics, Ciprofloxacin, E-mycin [erythromycin], and Penicillins    Social History:   reports that she has never smoked. She has never used smokeless tobacco. She reports that she does not drink alcohol and does not use drugs. Family History: family history includes Diabetes in an other family member; Glaucoma in her mother; Heart Disease in her mother; Pacemaker in her mother. No h/o sudden cardiac death. No for premature CAD    REVIEW OF SYSTEMS:    Constitutional: there has been no unanticipated weight loss. There's been No change in energy level, No change in activity level. Eyes: No visual changes or diplopia. No scleral icterus. ENT: No Headaches, hearing loss or vertigo. No mouth sores or sore throat. Cardiovascular: see above  Respiratory: see above  Gastrointestinal: No abdominal pain, appetite loss, blood in stools. Genitourinary: No dysuria, trouble voiding, or hematuria. Musculoskeletal:  No gait disturbance, No weakness or joint complaints. Integumentary: No rash or pruritis. Neurological: No headache or diplopia. No tingling  Psychiatric: No anxiety, or depression. Endocrine: No temperature intolerance. Hematologic/Lymphatic: No abnormal bruising or bleeding, blood clots or swollen lymph nodes. Allergic/Immunologic: No nasal congestion or hives. PHYSICAL EXAM:      There were no vitals filed for this visit. Constitutional and General Appearance: alert, cooperative, no distress and appears stated age  [de-identified]: PERRL, no cervical lymphadenopathy. No masses palpable. Normal oral mucosa  Respiratory:  Normal excursion and expansion without use of accessory muscles  Resp Auscultation: Good respiratory effort. No for increased work of breathing.  On auscultation: clear to auscultation bilaterally  Cardiovascular:  Heart tones are crisp and normal. regular S1 and S2.  Jugular venous pulsation Normal  The carotid upstroke is normal in amplitude and contour without delay or bruit   Abdomen:   soft  Bowel sounds present  Extremities:   No edema  Neurological:  Alert and oriented. Cardiac Data:  EKG: NSR, NL ECG  Holter monitor 6/29/22 SR/ST, Rate PVC, PAT   ECHO 6/29/22 Normal left ventricular diameter. Left ventricular systolic function is normal.  Left ventricular ejection fraction 60 %. No doppler evidence of diastolic dysfunction. Minimal mitral valve prolapse with trivial regurgitation. Normal function of other valves. No pericardial effusion    Labs:     CBC: No results for input(s): WBC, HGB, HCT, PLT in the last 72 hours. BMP: No results for input(s): NA, K, CO2, BUN, CREATININE, LABGLOM, GLUCOSE in the last 72 hours. PT/INR: No results for input(s): PROTIME, INR in the last 72 hours. FASTING LIPID PANEL:  Lab Results   Component Value Date/Time    HDL 43 12/05/2022 09:19 AM    LDLCALC 159 05/02/2016 12:00 AM    TRIG 143 12/05/2022 09:19 AM     LIVER PROFILE:No results for input(s): AST, ALT, LABALBU in the last 72 hours. Problem List:  Patient Active Problem List   Diagnosis    Mitral valve prolapse    Supraventricular tachycardia ([facility] [address]    Hypertension    Hyperlipidemia    Allergic rhinitis    Impaired fasting blood sugar    Sigmoid diverticulosis    Obstructive sleep apnea    Cataract, post subcapsular polar senile    Hemangioma of face    Venous malformation    History of colon polyps    Tracheostomy status ([facility] [address]        Assessment and plan:    -H/o SVT 1995- Pre syncopal episodes currently. Will order holter monitor. Continue BB Liberalize po fluid intake 20oz a day. -Minimal MVP per ECHO   -Hyperlipidemia- Pt refusing statin today. Diet and exercise recommended. -AVM of lower face- s/p sclerotherapy  -S/p tracheostomy  -JOSIE-improved after AVM surgery  -RTC 6 months.     Ute Cooney, APRN - [address] [place] [address] [facility]  [phone]

## 2023-01-09 ENCOUNTER — HOSPITAL ENCOUNTER (OUTPATIENT)
Dept: NON INVASIVE DIAGNOSTICS | Age: 73
Discharge: HOME | End: 2023-01-09
Payer: MEDICARE

## 2023-01-09 DIAGNOSIS — I47.1 SVT (SUPRAVENTRICULAR TACHYCARDIA) (HCC): ICD-10-CM

## 2023-01-09 PROCEDURE — 93226 XTRNL ECG REC<48 HR SCAN A/R: CPT

## 2023-01-09 PROCEDURE — 93225 XTRNL ECG REC<48 HRS REC: CPT

## 2023-01-11 ENCOUNTER — HOSPITAL ENCOUNTER (OUTPATIENT)
Dept: NON INVASIVE DIAGNOSTICS | Age: 73
Discharge: HOME | End: 2023-01-11

## 2023-01-13 ENCOUNTER — TELEPHONE (OUTPATIENT)
Dept: CARDIOLOGY | Age: 73
End: 2023-01-13

## 2023-01-13 NOTE — TELEPHONE ENCOUNTER
Very frequent PAC's, bigeminy 6 beats run of PAT. Occasional PVC's on holter report. Please review and advise.

## 2023-01-18 NOTE — TELEPHONE ENCOUNTER
Patient notified to increase metoprolol to 37.5mg daily and do 1.5 tablets of the 25mg. Pt verbalized understanding of results and instructions for med increase. Pt had no further questions at the time.

## 2023-04-24 DIAGNOSIS — Z12.31 SCREENING MAMMOGRAM, ENCOUNTER FOR: Primary | ICD-10-CM

## 2023-05-03 ENCOUNTER — HOSPITAL ENCOUNTER (OUTPATIENT)
Dept: MAMMOGRAPHY | Age: 73
Discharge: HOME | End: 2023-05-05
Payer: MEDICARE

## 2023-05-03 DIAGNOSIS — Z12.31 SCREENING MAMMOGRAM, ENCOUNTER FOR: ICD-10-CM

## 2023-05-03 PROCEDURE — 77063 BREAST TOMOSYNTHESIS BI: CPT

## 2023-05-22 DIAGNOSIS — I47.1 PAT (PAROXYSMAL ATRIAL TACHYCARDIA) (HCC): ICD-10-CM

## 2023-05-22 RX ORDER — METOPROLOL SUCCINATE 25 MG/1
37.5 TABLET, EXTENDED RELEASE ORAL DAILY
Qty: 135 TABLET | Refills: 3 | Status: SHIPPED | OUTPATIENT
Start: 2023-05-22

## 2023-05-22 NOTE — TELEPHONE ENCOUNTER
Pt calling for new prescription of metoprolol 25 mg 1-1/2 tabs. Prescription was increased to 1-1/2 tabs but she will soon be out. She will split the few she has left.     Last Appt:  1/4/2023  Next Appt:   7/5/2023  Med verified in [address]

## 2023-06-06 ENCOUNTER — HOSPITAL ENCOUNTER (OUTPATIENT)
Age: 73
Discharge: HOME | End: 2023-06-06
Payer: MEDICARE

## 2023-06-06 DIAGNOSIS — R73.01 IMPAIRED FASTING BLOOD SUGAR: ICD-10-CM

## 2023-06-06 DIAGNOSIS — E78.5 HYPERLIPIDEMIA, UNSPECIFIED HYPERLIPIDEMIA TYPE: ICD-10-CM

## 2023-06-06 DIAGNOSIS — I10 ESSENTIAL HYPERTENSION: ICD-10-CM

## 2023-06-06 LAB
ALBUMIN SERPL-MCNC: 4.6 G/DL (ref 3.5–5.2)
ALBUMIN/GLOB SERPL: 1.4 {RATIO} (ref 1–2.5)
ALP SERPL-CCNC: 75 U/L (ref 35–104)
ALT SERPL-CCNC: 15 U/L (ref 5–33)
ANION GAP SERPL CALCULATED.3IONS-SCNC: 10 MMOL/L (ref 9–17)
AST SERPL-CCNC: 16 U/L
BASOPHILS # BLD: 0.05 K/UL (ref 0–0.2)
BASOPHILS NFR BLD: 1 % (ref 0–2)
BILIRUB SERPL-MCNC: 0.5 MG/DL (ref 0.3–1.2)
BUN SERPL-MCNC: 22 MG/DL (ref 8–23)
BUN/CREAT SERPL: 28 (ref 9–20)
CALCIUM SERPL-MCNC: 9.8 MG/DL (ref 8.6–10.4)
CHLORIDE SERPL-SCNC: 106 MMOL/L (ref 98–107)
CHOLEST SERPL-MCNC: 218 MG/DL
CHOLESTEROL/HDL RATIO: 5.1
CO2 SERPL-SCNC: 28 MMOL/L (ref 20–31)
CREAT SERPL-MCNC: 0.78 MG/DL (ref 0.5–0.9)
EOSINOPHIL # BLD: 0.19 K/UL (ref 0–0.44)
EOSINOPHILS RELATIVE PERCENT: 3 % (ref 1–4)
ERYTHROCYTE [DISTWIDTH] IN BLOOD BY AUTOMATED COUNT: 13.2 % (ref 11.8–14.4)
EST. AVERAGE GLUCOSE BLD GHB EST-MCNC: 126 MG/DL
GFR SERPL CREATININE-BSD FRML MDRD: >60 ML/MIN/1.73M2
GLUCOSE SERPL-MCNC: 102 MG/DL (ref 70–99)
HBA1C MFR BLD: 6 % (ref 4–6)
HCT VFR BLD AUTO: 43.6 % (ref 36.3–47.1)
HDLC SERPL-MCNC: 43 MG/DL
HGB BLD-MCNC: 14.3 G/DL (ref 11.9–15.1)
IMM GRANULOCYTES # BLD AUTO: <0.03 K/UL (ref 0–0.3)
IMM GRANULOCYTES NFR BLD: 0 %
LDLC SERPL CALC-MCNC: 147 MG/DL (ref 0–130)
LYMPHOCYTES # BLD: 36 % (ref 24–43)
LYMPHOCYTES NFR BLD: 2.13 K/UL (ref 1.1–3.7)
MCH RBC QN AUTO: 30.5 PG (ref 25.2–33.5)
MCHC RBC AUTO-ENTMCNC: 32.8 G/DL (ref 25.2–33.5)
MCV RBC AUTO: 93 FL (ref 82.6–102.9)
MONOCYTES NFR BLD: 0.53 K/UL (ref 0.1–1.2)
MONOCYTES NFR BLD: 9 % (ref 3–12)
NEUTROPHILS NFR BLD: 51 % (ref 36–65)
NEUTS SEG NFR BLD: 3.07 K/UL (ref 1.5–8.1)
NRBC AUTOMATED: 0 PER 100 WBC
PLATELET # BLD AUTO: 230 K/UL (ref 138–453)
PMV BLD AUTO: 9.7 FL (ref 8.1–13.5)
POTASSIUM SERPL-SCNC: 4.3 MMOL/L (ref 3.7–5.3)
PROT SERPL-MCNC: 7.9 G/DL (ref 6.4–8.3)
RBC # BLD AUTO: 4.69 M/UL (ref 3.95–5.11)
SODIUM SERPL-SCNC: 144 MMOL/L (ref 135–144)
TRIGL SERPL-MCNC: 138 MG/DL
WBC OTHER # BLD: 6 K/UL (ref 3.5–11.3)

## 2023-06-06 PROCEDURE — 36415 COLL VENOUS BLD VENIPUNCTURE: CPT

## 2023-06-06 PROCEDURE — 83036 HEMOGLOBIN GLYCOSYLATED A1C: CPT

## 2023-06-06 PROCEDURE — 85027 COMPLETE CBC AUTOMATED: CPT

## 2023-06-06 PROCEDURE — 80053 COMPREHEN METABOLIC PANEL: CPT

## 2023-06-06 PROCEDURE — 80061 LIPID PANEL: CPT

## 2023-07-19 ENCOUNTER — OFFICE VISIT (OUTPATIENT)
Dept: CARDIOLOGY | Age: 73
End: 2023-07-19
Payer: MEDICARE

## 2023-07-19 VITALS
SYSTOLIC BLOOD PRESSURE: 122 MMHG | WEIGHT: 130.4 LBS | BODY MASS INDEX: 24.62 KG/M2 | HEART RATE: 64 BPM | HEIGHT: 61 IN | DIASTOLIC BLOOD PRESSURE: 78 MMHG

## 2023-07-19 DIAGNOSIS — I34.1 MVP (MITRAL VALVE PROLAPSE): ICD-10-CM

## 2023-07-19 DIAGNOSIS — I47.1 SVT (SUPRAVENTRICULAR TACHYCARDIA) (HCC): Primary | ICD-10-CM

## 2023-07-19 PROCEDURE — 3074F SYST BP LT 130 MM HG: CPT | Performed by: INTERNAL MEDICINE

## 2023-07-19 PROCEDURE — 1090F PRES/ABSN URINE INCON ASSESS: CPT | Performed by: INTERNAL MEDICINE

## 2023-07-19 PROCEDURE — 99214 OFFICE O/P EST MOD 30 MIN: CPT | Performed by: INTERNAL MEDICINE

## 2023-07-19 PROCEDURE — G8427 DOCREV CUR MEDS BY ELIG CLIN: HCPCS | Performed by: INTERNAL MEDICINE

## 2023-07-19 PROCEDURE — 93005 ELECTROCARDIOGRAM TRACING: CPT | Performed by: INTERNAL MEDICINE

## 2023-07-19 PROCEDURE — 93010 ELECTROCARDIOGRAM REPORT: CPT | Performed by: INTERNAL MEDICINE

## 2023-07-19 PROCEDURE — 1123F ACP DISCUSS/DSCN MKR DOCD: CPT | Performed by: INTERNAL MEDICINE

## 2023-07-19 PROCEDURE — G8399 PT W/DXA RESULTS DOCUMENT: HCPCS | Performed by: INTERNAL MEDICINE

## 2023-07-19 PROCEDURE — G8420 CALC BMI NORM PARAMETERS: HCPCS | Performed by: INTERNAL MEDICINE

## 2023-07-19 PROCEDURE — 99213 OFFICE O/P EST LOW 20 MIN: CPT | Performed by: INTERNAL MEDICINE

## 2023-07-19 PROCEDURE — 3017F COLORECTAL CA SCREEN DOC REV: CPT | Performed by: INTERNAL MEDICINE

## 2023-07-19 PROCEDURE — 1036F TOBACCO NON-USER: CPT | Performed by: INTERNAL MEDICINE

## 2023-07-19 PROCEDURE — 3078F DIAST BP <80 MM HG: CPT | Performed by: INTERNAL MEDICINE

## 2023-07-19 NOTE — PROGRESS NOTES
Today's Date: 7/19/2023  Patient's Name: Rogelio Gomez  Patient's age: 74 y. o., 1950    Subjective:  Rogelio Gomez is being seen in clinic today regarding PVCs, palpitations      she is doing well from a cardiac standpoint. After holter monitoring, metoprolol was increased to 37.5mg po qday. She denies any palpitations. No chest pain, no dyspnea, no PND, no syncope or pre-syncope, no orthopnea. Past Medical History:   has a past medical history of Allergic rhinitis, GERD (gastroesophageal reflux disease), Hemangioma of face, Impaired fasting blood sugar, Internal hemorrhoids, Irritable bowel syndrome, Menometrorrhagia, Mitral valve prolapse, Obstructive sleep apnea, Osteoarthritis, Sigmoid diverticulosis, and Supraventricular tachycardia ([address] Past Surgical History:   has a past surgical history that includes Cholecystectomy (03/20/2000); Mouth surgery; Tonsillectomy; Upper gastrointestinal endoscopy (01/31/2014); Colonoscopy (06/12/2009); Colonoscopy (11/19/2014); Cataract removal with implant (Left, 11/08/2016); Cataract removal with implant (12/06/2016); tracheostomy (11/14/2017); Gastrostomy tube placement (01/08/2018); other surgical history; Breast biopsy (Right, 11/18/1987); Hysterectomy (05/01/2000); Ovary removal (Bilateral, 05/2000); and Colonoscopy (N/A, 7/22/2021). Home Medications:  Prior to Admission medications    Medication Sig Start Date End Date Taking?  Authorizing Provider   metoprolol succinate (TOPROL XL) 25 MG extended release tablet Take 1.5 tablets by mouth daily 5/22/23   MARIS Jameson - CNP   Calcium Carbonate-Vit D-Min (CALTRATE PLUS PO) Take by mouth Chocolate chews    Historical Provider, MD   docusate sodium (COLACE) 100 MG capsule Take 1 capsule by mouth as needed for Constipation    Historical Provider, MD       Allergies:  Sulfa antibiotics, Ciprofloxacin, E-mycin [erythromycin], and Penicillins    Social History:   reports that she has never

## 2023-10-10 ENCOUNTER — IMMUNIZATION (OUTPATIENT)
Dept: LAB | Age: 73
End: 2023-10-10
Payer: MEDICARE

## 2023-10-10 PROCEDURE — 90694 VACC AIIV4 NO PRSRV 0.5ML IM: CPT | Performed by: FAMILY MEDICINE

## 2023-10-10 PROCEDURE — PBSHW INFLUENZA, FLUAD, (AGE 65 Y+), IM, PF, 0.5 ML: Performed by: FAMILY MEDICINE

## 2023-10-19 ENCOUNTER — OFFICE VISIT (OUTPATIENT)
Dept: PRIMARY CARE CLINIC | Age: 73
End: 2023-10-19
Payer: MEDICARE

## 2023-10-19 VITALS
OXYGEN SATURATION: 97 % | HEIGHT: 61 IN | DIASTOLIC BLOOD PRESSURE: 72 MMHG | SYSTOLIC BLOOD PRESSURE: 120 MMHG | HEART RATE: 68 BPM | TEMPERATURE: 96.6 F | WEIGHT: 132 LBS | BODY MASS INDEX: 24.92 KG/M2 | RESPIRATION RATE: 16 BRPM
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
## 2023-10-19 DIAGNOSIS — S80.862A INSECT BITE OF LEFT LOWER LEG, INITIAL ENCOUNTER: Primary | ICD-10-CM

## 2023-10-19 PROCEDURE — G8399 PT W/DXA RESULTS DOCUMENT: HCPCS | Performed by: FAMILY MEDICINE

## 2023-10-19 PROCEDURE — 99213 OFFICE O/P EST LOW 20 MIN: CPT | Performed by: FAMILY MEDICINE

## 2023-10-19 PROCEDURE — 1036F TOBACCO NON-USER: CPT | Performed by: FAMILY MEDICINE

## 2023-10-19 PROCEDURE — 3017F COLORECTAL CA SCREEN DOC REV: CPT | Performed by: FAMILY MEDICINE

## 2023-10-19 PROCEDURE — G8420 CALC BMI NORM PARAMETERS: HCPCS | Performed by: FAMILY MEDICINE

## 2023-10-19 PROCEDURE — 1123F ACP DISCUSS/DSCN MKR DOCD: CPT | Performed by: FAMILY MEDICINE

## 2023-10-19 PROCEDURE — 3078F DIAST BP <80 MM HG: CPT | Performed by: FAMILY MEDICINE

## 2023-10-19 PROCEDURE — G8484 FLU IMMUNIZE NO ADMIN: HCPCS | Performed by: FAMILY MEDICINE

## 2023-10-19 PROCEDURE — 3074F SYST BP LT 130 MM HG: CPT | Performed by: FAMILY MEDICINE

## 2023-10-19 PROCEDURE — 1090F PRES/ABSN URINE INCON ASSESS: CPT | Performed by: FAMILY MEDICINE

## 2023-10-19 PROCEDURE — G8427 DOCREV CUR MEDS BY ELIG CLIN: HCPCS | Performed by: FAMILY MEDICINE

## 2023-10-19 RX ORDER — DOXYCYCLINE HYCLATE 100 MG
100 TABLET ORAL 2 TIMES DAILY
Qty: 20 TABLET | Refills: 0 | Status: SHIPPED | OUTPATIENT
Start: 2023-10-19 | End: 2023-10-29

## 2023-10-19 ASSESSMENT — ENCOUNTER SYMPTOMS
GASTROINTESTINAL NEGATIVE: 1
COLOR CHANGE: 1
RESPIRATORY NEGATIVE: 1
EYES NEGATIVE: 1

## 2023-10-19 NOTE — PROGRESS NOTES
Subjective:      Patient ID: Cleo Little is a 74 y.o. female. HPI acute walk in clinic visit for suspected insect bite on the right ankle from 6 days ago. She was outside doing lawn raking work. Noted spot itching on the ankle that night, suspected insect bite at the time. Never saw offending insect. Still itches. Getting some erythema around the area, concerns for infection complication. Past Medical History:   Diagnosis Date    Allergic rhinitis     GERD (gastroesophageal reflux disease)     Hemangioma of face     And arm. Impaired fasting blood sugar     Internal hemorrhoids     Irritable bowel syndrome     Menometrorrhagia     Severe. Mitral valve prolapse     Obstructive sleep apnea     non compliant with cpap    Osteoarthritis     Sigmoid diverticulosis     Supraventricular tachycardia      Past Surgical History:   Procedure Laterality Date    BREAST BIOPSY Right 11/18/1987    excisional biopsy    CATARACT REMOVAL WITH IMPLANT Left 11/08/2016    CATARACT REMOVAL WITH IMPLANT  12/06/2016    CHOLECYSTECTOMY  03/20/2000    COLONOSCOPY  06/12/2009    Showed sigmoid diverticulosis and internal hemorrhoids. COLONOSCOPY  11/19/2014    diverticulosis, rectal polyp hyperplastic    COLONOSCOPY N/A 7/22/2021    COLONOSCOPY HOT BIOPSY performed by Demi Mckeon DO at [facility]  01/08/2018    HYSTERECTOMY (CERVIX STATUS UNKNOWN)  05/01/2000    MOUTH SURGERY      OTHER SURGICAL HISTORY      Scleratherapy-tongue, bottom lip and chin    OVARY REMOVAL Bilateral 05/2000    With leiomyoma noted. TONSILLECTOMY      TRACHEOSTOMY  11/14/2017    UPPER GASTROINTESTINAL ENDOSCOPY  01/31/2014    multiple fundic gland polyps benign. hiatal hernia, hemangioma of the tongue and base of the tongue and hypopharnx         Review of Systems   Constitutional: Negative. HENT: Negative. Eyes: Negative. Respiratory: Negative. Cardiovascular: Negative.     Gastrointestinal:

## 2023-11-24 ENCOUNTER — OFFICE VISIT (OUTPATIENT)
Dept: PRIMARY CARE CLINIC | Age: 73
End: 2023-11-24
Payer: MEDICARE

## 2023-11-24 VITALS
TEMPERATURE: 98.1 F | OXYGEN SATURATION: 94 % | SYSTOLIC BLOOD PRESSURE: 112 MMHG | DIASTOLIC BLOOD PRESSURE: 76 MMHG | HEIGHT: 60 IN | BODY MASS INDEX: 25.52 KG/M2 | RESPIRATION RATE: 16 BRPM | HEART RATE: 110 BPM | WEIGHT: 130 LBS

## 2023-11-24 DIAGNOSIS — R68.83 CHILLS: ICD-10-CM

## 2023-11-24 DIAGNOSIS — U07.1 COVID-19: Primary | ICD-10-CM

## 2023-11-24 LAB
INFLUENZA A ANTIGEN, POC: NEGATIVE
INFLUENZA B ANTIGEN, POC: NEGATIVE
LOT EXPIRE DATE: ABNORMAL
LOT KIT NUMBER: ABNORMAL
SARS-COV-2, POC: DETECTED
VALID INTERNAL CONTROL: ABNORMAL
VENDOR AND KIT NAME POC: ABNORMAL

## 2023-11-24 PROCEDURE — 87428 SARSCOV & INF VIR A&B AG IA: CPT | Performed by: FAMILY MEDICINE

## 2023-11-24 RX ORDER — BENZONATATE 100 MG/1
100 CAPSULE ORAL 3 TIMES DAILY PRN
Qty: 30 CAPSULE | Refills: 0 | Status: SHIPPED | OUTPATIENT
Start: 2023-11-24 | End: 2023-12-04

## 2023-11-24 RX ORDER — BENZONATATE 100 MG/1
100 CAPSULE ORAL 3 TIMES DAILY PRN
Qty: 30 CAPSULE | Refills: 0 | Status: SHIPPED | OUTPATIENT
Start: 2023-11-24 | End: 2023-11-24 | Stop reason: SDUPTHER

## 2023-11-24 ASSESSMENT — ENCOUNTER SYMPTOMS
SINUS PRESSURE: 1
TROUBLE SWALLOWING: 0
SHORTNESS OF BREATH: 0
NAUSEA: 0
ABDOMINAL PAIN: 0
COUGH: 1
WHEEZING: 0
SORE THROAT: 0
CHOKING: 0
CHEST TIGHTNESS: 0
VOMITING: 0
CONSTIPATION: 0
SINUS PAIN: 1
DIARRHEA: 0

## 2023-11-24 NOTE — PROGRESS NOTES
[facility]  [address]  [address] [address]  Dept: [phone]  Dept Fax: [phone]    Sadia Hutson a 74 y.o. female who presents today for her medical conditions/complaints as notedbelow. Ellie Jaime is c/o of   Chief Complaint   Patient presents with    URI     Pt ill Tuesday with chills, malaise, congestion and cough productive yellow. HPI:     Here today for a uri. URI   This is a new problem. The current episode started in the past 7 days (4 days). The problem has been unchanged. There has been no fever. Associated symptoms include congestion, coughing (productive), a plugged ear sensation and sinus pain. Pertinent negatives include no abdominal pain, chest pain, diarrhea, ear pain, headaches, nausea, rash, sneezing, sore throat, vomiting or wheezing. Associated symptoms comments: Body aches, chills. She has tried acetaminophen (honey) for the symptoms. The treatment provided mild relief. Past Medical History:   Diagnosis Date    Allergic rhinitis     GERD (gastroesophageal reflux disease)     Hemangioma of face     And arm. Impaired fasting blood sugar     Internal hemorrhoids     Irritable bowel syndrome     Menometrorrhagia     Severe. Mitral valve prolapse     Obstructive sleep apnea     non compliant with cpap    Osteoarthritis     Sigmoid diverticulosis     Supraventricular tachycardia           Social History     Tobacco Use    Smoking status: Never    Smokeless tobacco: Never   Substance Use Topics    Alcohol use: No     Alcohol/week: 0.0 standard drinks of alcohol     Current Outpatient Medications   Medication Sig Dispense Refill    nirmatrelvir/ritonavir 300/100 (PAXLOVID) 20 x 150 MG & 10 x 100MG TBPK Take 3 tablets (two 150 mg nirmatrelvir and one 100 mg ritonavir tablets) by mouth every 12 hours for 5 days.  30 tablet 0

## 2023-12-04 ENCOUNTER — HOSPITAL ENCOUNTER (OUTPATIENT)
Age: 73
Discharge: HOME | End: 2023-12-04
Payer: MEDICARE

## 2023-12-04 DIAGNOSIS — Z11.59 NEED FOR HEPATITIS C SCREENING TEST: ICD-10-CM

## 2023-12-04 DIAGNOSIS — I10 HYPERTENSION, UNSPECIFIED TYPE: ICD-10-CM

## 2023-12-04 DIAGNOSIS — E78.5 HYPERLIPIDEMIA, UNSPECIFIED HYPERLIPIDEMIA TYPE: ICD-10-CM

## 2023-12-04 DIAGNOSIS — R73.01 IMPAIRED FASTING BLOOD SUGAR: ICD-10-CM

## 2023-12-04 LAB
ALBUMIN SERPL-MCNC: 3.9 G/DL (ref 3.5–5.2)
ALBUMIN/GLOB SERPL: 1.2 {RATIO} (ref 1–2.5)
ALP SERPL-CCNC: 67 U/L (ref 35–104)
ALT SERPL-CCNC: 24 U/L (ref 5–33)
ANION GAP SERPL CALCULATED.3IONS-SCNC: 10 MMOL/L (ref 9–17)
AST SERPL-CCNC: 19 U/L
BASOPHILS # BLD: 0.04 K/UL (ref 0–0.2)
BASOPHILS NFR BLD: 1 % (ref 0–2)
BILIRUB SERPL-MCNC: 0.4 MG/DL (ref 0.3–1.2)
BUN SERPL-MCNC: 20 MG/DL (ref 8–23)
BUN/CREAT SERPL: 25 (ref 9–20)
CALCIUM SERPL-MCNC: 9.2 MG/DL (ref 8.6–10.4)
CHLORIDE SERPL-SCNC: 106 MMOL/L (ref 98–107)
CHOLEST SERPL-MCNC: 221 MG/DL (ref 0–199)
CHOLESTEROL/HDL RATIO: 5
CO2 SERPL-SCNC: 28 MMOL/L (ref 20–31)
CREAT SERPL-MCNC: 0.8 MG/DL (ref 0.5–0.9)
EOSINOPHIL # BLD: 0.15 K/UL (ref 0–0.44)
EOSINOPHILS RELATIVE PERCENT: 2 % (ref 1–4)
ERYTHROCYTE [DISTWIDTH] IN BLOOD BY AUTOMATED COUNT: 13 % (ref 11.8–14.4)
EST. AVERAGE GLUCOSE BLD GHB EST-MCNC: 128 MG/DL
GFR SERPL CREATININE-BSD FRML MDRD: >60 ML/MIN/1.73M2
GLUCOSE SERPL-MCNC: 100 MG/DL (ref 70–99)
HBA1C MFR BLD: 6.1 % (ref 4–6)
HCT VFR BLD AUTO: 40.4 % (ref 36.3–47.1)
HCV AB SERPL QL IA: NONREACTIVE
HDLC SERPL-MCNC: 43 MG/DL
HGB BLD-MCNC: 13 G/DL (ref 11.9–15.1)
IMM GRANULOCYTES # BLD AUTO: 0.04 K/UL (ref 0–0.3)
IMM GRANULOCYTES NFR BLD: 1 %
LDLC SERPL CALC-MCNC: 142 MG/DL (ref 0–100)
LYMPHOCYTES NFR BLD: 2.12 K/UL (ref 1.1–3.7)
LYMPHOCYTES RELATIVE PERCENT: 32 % (ref 24–43)
MCH RBC QN AUTO: 30 PG (ref 25.2–33.5)
MCHC RBC AUTO-ENTMCNC: 32.2 G/DL (ref 25.2–33.5)
MCV RBC AUTO: 93.1 FL (ref 82.6–102.9)
MONOCYTES NFR BLD: 0.61 K/UL (ref 0.1–1.2)
MONOCYTES NFR BLD: 9 % (ref 3–12)
NEUTROPHILS NFR BLD: 55 % (ref 36–65)
NEUTS SEG NFR BLD: 3.65 K/UL (ref 1.5–8.1)
NRBC BLD-RTO: 0 PER 100 WBC
PLATELET # BLD AUTO: 305 K/UL (ref 138–453)
PMV BLD AUTO: 9.5 FL (ref 8.1–13.5)
POTASSIUM SERPL-SCNC: 4.1 MMOL/L (ref 3.7–5.3)
PROT SERPL-MCNC: 7.2 G/DL (ref 6.4–8.3)
RBC # BLD AUTO: 4.34 M/UL (ref 3.95–5.11)
SODIUM SERPL-SCNC: 144 MMOL/L (ref 135–144)
TRIGL SERPL-MCNC: 178 MG/DL (ref 0–149)
VLDLC SERPL CALC-MCNC: 36 MG/DL
WBC OTHER # BLD: 6.6 K/UL (ref 3.5–11.3)

## 2023-12-04 PROCEDURE — 83036 HEMOGLOBIN GLYCOSYLATED A1C: CPT

## 2023-12-04 PROCEDURE — 86803 HEPATITIS C AB TEST: CPT

## 2023-12-04 PROCEDURE — 85025 COMPLETE CBC W/AUTO DIFF WBC: CPT

## 2023-12-04 PROCEDURE — 80053 COMPREHEN METABOLIC PANEL: CPT

## 2023-12-04 PROCEDURE — 36415 COLL VENOUS BLD VENIPUNCTURE: CPT

## 2023-12-04 PROCEDURE — 80061 LIPID PANEL: CPT

## 2023-12-11 ENCOUNTER — OFFICE VISIT (OUTPATIENT)
Dept: FAMILY MEDICINE CLINIC | Age: 73
End: 2023-12-11
Payer: MEDICARE

## 2023-12-11 VITALS
OXYGEN SATURATION: 99 % | HEIGHT: 60 IN | SYSTOLIC BLOOD PRESSURE: 130 MMHG | HEART RATE: 61 BPM | DIASTOLIC BLOOD PRESSURE: 70 MMHG | WEIGHT: 129.8 LBS | BODY MASS INDEX: 25.48 KG/M2

## 2023-12-11 DIAGNOSIS — Q27.39 ARTERIOVENOUS MALFORMATION, OTHER SITE: ICD-10-CM

## 2023-12-11 DIAGNOSIS — E78.5 HYPERLIPIDEMIA, UNSPECIFIED HYPERLIPIDEMIA TYPE: ICD-10-CM

## 2023-12-11 DIAGNOSIS — G47.33 OBSTRUCTIVE SLEEP APNEA: ICD-10-CM

## 2023-12-11 DIAGNOSIS — G89.29 CHRONIC BILATERAL LOW BACK PAIN WITHOUT SCIATICA: ICD-10-CM

## 2023-12-11 DIAGNOSIS — I10 HYPERTENSION, UNSPECIFIED TYPE: ICD-10-CM

## 2023-12-11 DIAGNOSIS — M54.50 CHRONIC BILATERAL LOW BACK PAIN WITHOUT SCIATICA: ICD-10-CM

## 2023-12-11 DIAGNOSIS — R42 DIZZINESS: ICD-10-CM

## 2023-12-11 DIAGNOSIS — R73.01 IMPAIRED FASTING BLOOD SUGAR: ICD-10-CM

## 2023-12-11 DIAGNOSIS — Z86.010 HISTORY OF COLON POLYPS: ICD-10-CM

## 2023-12-11 DIAGNOSIS — K57.30 SIGMOID DIVERTICULOSIS: ICD-10-CM

## 2023-12-11 DIAGNOSIS — J30.1 SEASONAL ALLERGIC RHINITIS DUE TO POLLEN: ICD-10-CM

## 2023-12-11 DIAGNOSIS — I47.10 SUPRAVENTRICULAR TACHYCARDIA: Primary | ICD-10-CM

## 2023-12-11 PROCEDURE — G8427 DOCREV CUR MEDS BY ELIG CLIN: HCPCS | Performed by: FAMILY MEDICINE

## 2023-12-11 PROCEDURE — 1090F PRES/ABSN URINE INCON ASSESS: CPT | Performed by: FAMILY MEDICINE

## 2023-12-11 PROCEDURE — G8399 PT W/DXA RESULTS DOCUMENT: HCPCS | Performed by: FAMILY MEDICINE

## 2023-12-11 PROCEDURE — 1123F ACP DISCUSS/DSCN MKR DOCD: CPT | Performed by: FAMILY MEDICINE

## 2023-12-11 PROCEDURE — 1036F TOBACCO NON-USER: CPT | Performed by: FAMILY MEDICINE

## 2023-12-11 PROCEDURE — 3017F COLORECTAL CA SCREEN DOC REV: CPT | Performed by: FAMILY MEDICINE

## 2023-12-11 PROCEDURE — 99213 OFFICE O/P EST LOW 20 MIN: CPT | Performed by: FAMILY MEDICINE

## 2023-12-11 PROCEDURE — 3075F SYST BP GE 130 - 139MM HG: CPT | Performed by: FAMILY MEDICINE

## 2023-12-11 PROCEDURE — 99214 OFFICE O/P EST MOD 30 MIN: CPT | Performed by: FAMILY MEDICINE

## 2023-12-11 PROCEDURE — 3078F DIAST BP <80 MM HG: CPT | Performed by: FAMILY MEDICINE

## 2023-12-11 PROCEDURE — G8419 CALC BMI OUT NRM PARAM NOF/U: HCPCS | Performed by: FAMILY MEDICINE

## 2023-12-11 PROCEDURE — G8484 FLU IMMUNIZE NO ADMIN: HCPCS | Performed by: FAMILY MEDICINE

## 2023-12-11 ASSESSMENT — ENCOUNTER SYMPTOMS
RESPIRATORY NEGATIVE: 1
GASTROINTESTINAL NEGATIVE: 1
CONSTIPATION: 0
EYES NEGATIVE: 1

## 2023-12-11 NOTE — PATIENT INSTRUCTIONS
Hospital Outpatient Visit on 12/04/2023   Component Date Value Ref Range Status    Hepatitis C Ab 12/04/2023 NONREACTIVE  NONREACTIVE Final    Comment:       The hepatitis C procedure used in our laboratory is a Chemiluminescent test specific for   three recombinant HCV antigens. A negative anti-HCV result indicates that the antibodies to   hepatitis C virus are not present at this time. Individuals with reactive anti-HCV should be considered infected and infectious until proven   otherwise. Confirmation of all equivocal or reactive results is recommended by ordering   HCV RNA by PCR. Hemoglobin A1C 12/04/2023 6.1 (H)  4.0 - 6.0 % Final    Estimated Avg Glucose 12/04/2023 128  mg/dL Final    Comment: The ADA and AACC recommend providing the estimated average glucose result to permit better   patient understanding of their HBA1c result.       WBC 12/04/2023 6.6  3.5 - 11.3 k/uL Final    RBC 12/04/2023 4.34  3.95 - 5.11 m/uL Final    Hemoglobin 12/04/2023 13.0  11.9 - 15.1 g/dL Final    Hematocrit 12/04/2023 40.4  36.3 - 47.1 % Final    MCV 12/04/2023 93.1  82.6 - 102.9 fL Final    MCH 12/04/2023 30.0  25.2 - 33.5 pg Final    MCHC 12/04/2023 32.2  25.2 - 33.5 g/dL Final    RDW 12/04/2023 13.0  11.8 - 14.4 % Final    Platelets 83/44/0733 305  138 - 453 k/uL Final    MPV 12/04/2023 9.5  8.1 - 13.5 fL Final    NRBC Automated 12/04/2023 0.0  0.0 per 100 WBC Final    Neutrophils % 12/04/2023 55  36 - 65 % Final    Lymphocytes % 12/04/2023 32  24 - 43 % Final    Monocytes % 12/04/2023 9  3 - 12 % Final    Eosinophils % 12/04/2023 2  1 - 4 % Final    Basophils % 12/04/2023 1  0 - 2 % Final    Immature Granulocytes 12/04/2023 1 (H)  0 % Final    Neutrophils Absolute 12/04/2023 3.65  1.50 - 8.10 k/uL Final    Lymphocytes Absolute 12/04/2023 2.12  1.10 - 3.70 k/uL Final    Monocytes Absolute 12/04/2023 0.61  0.10 - 1.20 k/uL Final    Eosinophils Absolute 12/04/2023 0.15  0.00 - 0.44 k/uL Final    Basophils Absolute

## 2023-12-11 NOTE — PROGRESS NOTES
Subjective:      Patient ID: Anaid Robertson is a 74 y.o. female. Hypertension    Hyperlipidemia    Skin Problem       Routine follow up on chronic medical conditions, refills, and review of updated labs. I have reviewed the patient's medical history in detail and updated the computerized patient record. She is feeling fairly well at present. She has a white and yellow kitten since last visit. \"Whit\". Keeping her busy . She had covid recently, had paxlovid for 5 days. Feels she did fairly well with no residual symptoms at present. 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 5 sclerotherapy treatments. Some reconstruction surgery of tracheostomy closure and lower left lip. She has completed procedures and is now working with dentist.  She needs some bone addition to mandibular area by description. Planning bracing and other dental work after that. invisiline braces being used at present. Night retainers in use now. She did have some bone graft under 6 of her mandibular incisors area by report. Has healed well. No mouth or oral concerns. A little tight at the left corner of the mouth with retainer in. Not biting the cheek. Allergies fairly quiescent at present. Intermittent rhinorrhea at present. Starting claritin daily. No significant gerd symptoms. Non compliant with cpap. She thinks interventions have improved this condition. No palpitations of concern. No bladder issues. Using prunes to help with constipation. Working well for her. Increased gas/bloating/flatulence at present. She feels she has likely gained some wt. During covid restrictions. Past Medical History:   Diagnosis Date    Allergic rhinitis     GERD (gastroesophageal reflux disease)     Hemangioma of face     And arm. Impaired fasting blood sugar     Internal hemorrhoids     Irritable bowel syndrome     Menometrorrhagia     Severe.     Mitral valve prolapse     Obstructive

## 2024-01-08 ENCOUNTER — OFFICE VISIT (OUTPATIENT)
Dept: PRIMARY CARE CLINIC | Age: 74
End: 2024-01-08
Payer: MEDICARE

## 2024-01-08 VITALS
TEMPERATURE: 98.2 F | HEART RATE: 74 BPM | WEIGHT: 131 LBS | SYSTOLIC BLOOD PRESSURE: 126 MMHG | BODY MASS INDEX: 25.58 KG/M2 | OXYGEN SATURATION: 98 % | DIASTOLIC BLOOD PRESSURE: 74 MMHG

## 2024-01-08 DIAGNOSIS — M65.9 TENOSYNOVITIS OF THUMB: Primary | ICD-10-CM

## 2024-01-08 PROCEDURE — 1090F PRES/ABSN URINE INCON ASSESS: CPT | Performed by: NURSE PRACTITIONER

## 2024-01-08 PROCEDURE — G8399 PT W/DXA RESULTS DOCUMENT: HCPCS | Performed by: NURSE PRACTITIONER

## 2024-01-08 PROCEDURE — 1123F ACP DISCUSS/DSCN MKR DOCD: CPT | Performed by: NURSE PRACTITIONER

## 2024-01-08 PROCEDURE — 3078F DIAST BP <80 MM HG: CPT | Performed by: NURSE PRACTITIONER

## 2024-01-08 PROCEDURE — 1036F TOBACCO NON-USER: CPT | Performed by: NURSE PRACTITIONER

## 2024-01-08 PROCEDURE — 3074F SYST BP LT 130 MM HG: CPT | Performed by: NURSE PRACTITIONER

## 2024-01-08 PROCEDURE — G8428 CUR MEDS NOT DOCUMENT: HCPCS | Performed by: NURSE PRACTITIONER

## 2024-01-08 PROCEDURE — 3017F COLORECTAL CA SCREEN DOC REV: CPT | Performed by: NURSE PRACTITIONER

## 2024-01-08 PROCEDURE — G8484 FLU IMMUNIZE NO ADMIN: HCPCS | Performed by: NURSE PRACTITIONER

## 2024-01-08 PROCEDURE — 99213 OFFICE O/P EST LOW 20 MIN: CPT | Performed by: NURSE PRACTITIONER

## 2024-01-08 PROCEDURE — G8419 CALC BMI OUT NRM PARAM NOF/U: HCPCS | Performed by: NURSE PRACTITIONER

## 2024-01-08 RX ORDER — PREDNISONE 20 MG/1
TABLET ORAL
Qty: 10 TABLET | Refills: 0 | Status: SHIPPED | OUTPATIENT
Start: 2024-01-08

## 2024-01-08 ASSESSMENT — PATIENT HEALTH QUESTIONNAIRE - PHQ9
SUM OF ALL RESPONSES TO PHQ9 QUESTIONS 1 & 2: 0
SUM OF ALL RESPONSES TO PHQ QUESTIONS 1-9: 0
1. LITTLE INTEREST OR PLEASURE IN DOING THINGS: 0
2. FEELING DOWN, DEPRESSED OR HOPELESS: 0
SUM OF ALL RESPONSES TO PHQ QUESTIONS 1-9: 0

## 2024-01-08 NOTE — PATIENT INSTRUCTIONS
Ice to thumb for 20min at a time for 4-5 times per day for 2-3 days total  May switch to warm water and epsom salt after icing for the first 3 days  Take prednisone as prescribed  F/u with Dr. Jaimes if symptoms worsen/persist

## 2024-01-09 NOTE — PROGRESS NOTES
needed for Constipation       No facility-administered encounter medications on file as of 1/8/2024.            Crow Pool, APRN - CNP

## 2024-01-31 ENCOUNTER — OFFICE VISIT (OUTPATIENT)
Dept: CARDIOLOGY | Age: 74
End: 2024-01-31
Payer: MEDICARE

## 2024-01-31 VITALS
HEART RATE: 71 BPM | BODY MASS INDEX: 25.91 KG/M2 | HEIGHT: 60 IN | WEIGHT: 132 LBS | DIASTOLIC BLOOD PRESSURE: 64 MMHG | SYSTOLIC BLOOD PRESSURE: 112 MMHG

## 2024-01-31 DIAGNOSIS — I47.10 SVT (SUPRAVENTRICULAR TACHYCARDIA): ICD-10-CM

## 2024-01-31 DIAGNOSIS — I47.19 PAT (PAROXYSMAL ATRIAL TACHYCARDIA): ICD-10-CM

## 2024-01-31 DIAGNOSIS — I34.1 MITRAL VALVE PROLAPSE: Primary | ICD-10-CM

## 2024-01-31 PROCEDURE — G8399 PT W/DXA RESULTS DOCUMENT: HCPCS | Performed by: INTERNAL MEDICINE

## 2024-01-31 PROCEDURE — 3074F SYST BP LT 130 MM HG: CPT | Performed by: INTERNAL MEDICINE

## 2024-01-31 PROCEDURE — 1090F PRES/ABSN URINE INCON ASSESS: CPT | Performed by: INTERNAL MEDICINE

## 2024-01-31 PROCEDURE — G8419 CALC BMI OUT NRM PARAM NOF/U: HCPCS | Performed by: INTERNAL MEDICINE

## 2024-01-31 PROCEDURE — 1123F ACP DISCUSS/DSCN MKR DOCD: CPT | Performed by: INTERNAL MEDICINE

## 2024-01-31 PROCEDURE — 93005 ELECTROCARDIOGRAM TRACING: CPT | Performed by: INTERNAL MEDICINE

## 2024-01-31 PROCEDURE — 93010 ELECTROCARDIOGRAM REPORT: CPT | Performed by: INTERNAL MEDICINE

## 2024-01-31 PROCEDURE — 1036F TOBACCO NON-USER: CPT | Performed by: INTERNAL MEDICINE

## 2024-01-31 PROCEDURE — 99214 OFFICE O/P EST MOD 30 MIN: CPT | Performed by: INTERNAL MEDICINE

## 2024-01-31 PROCEDURE — G8427 DOCREV CUR MEDS BY ELIG CLIN: HCPCS | Performed by: INTERNAL MEDICINE

## 2024-01-31 PROCEDURE — G8484 FLU IMMUNIZE NO ADMIN: HCPCS | Performed by: INTERNAL MEDICINE

## 2024-01-31 PROCEDURE — 3078F DIAST BP <80 MM HG: CPT | Performed by: INTERNAL MEDICINE

## 2024-01-31 PROCEDURE — 3017F COLORECTAL CA SCREEN DOC REV: CPT | Performed by: INTERNAL MEDICINE

## 2024-01-31 PROCEDURE — 99212 OFFICE O/P EST SF 10 MIN: CPT | Performed by: INTERNAL MEDICINE

## 2024-01-31 RX ORDER — METOPROLOL SUCCINATE 25 MG/1
37.5 TABLET, EXTENDED RELEASE ORAL DAILY
Qty: 135 TABLET | Refills: 3 | Status: SHIPPED | OUTPATIENT
Start: 2024-01-31

## 2024-01-31 NOTE — PROGRESS NOTES
bruit   Abdomen:   soft  Bowel sounds present  Extremities:   No edema  Neurological:  Alert and oriented.    Cardiac Data:  EKG: SR,  anterior infarction    Labs:     CBC: No results for input(s): \"WBC\", \"HGB\", \"HCT\", \"PLT\" in the last 72 hours.  BMP: No results for input(s): \"NA\", \"K\", \"CO2\", \"BUN\", \"CREATININE\", \"LABGLOM\", \"GLUCOSE\" in the last 72 hours.  PT/INR: No results for input(s): \"PROTIME\", \"INR\" in the last 72 hours.  FASTING LIPID PANEL:  Lab Results   Component Value Date/Time    HDL 43 12/04/2023 08:53 AM    LDLCALC 159 05/02/2016 12:00 AM    TRIG 178 12/04/2023 08:53 AM     LIVER PROFILE:No results for input(s): \"AST\", \"ALT\", \"LABALBU\" in the last 72 hours.    Problem List:  Patient Active Problem List   Diagnosis    Mitral valve prolapse    Supraventricular tachycardia    Hypertension    Hyperlipidemia    Allergic rhinitis    Impaired fasting blood sugar    Sigmoid diverticulosis    Obstructive sleep apnea    Cataract, post subcapsular polar senile    Hemangioma of face    Venous malformation    History of colon polyps    Tracheostomy status (HCC)        Holter monitor 6/29/22 SR/ST, Rate PVC, PAT      ECHO 6/29/22 Normal left ventricular diameter.  Left ventricular systolic function is normal.  Left ventricular ejection fraction 60 %.  No doppler evidence of diastolic dysfunction.  Minimal mitral valve prolapse with trivial regurgitation.  Normal function of other valves.  No pericardial effusion.     Holter 1/12/23-Frequent PACs 1.4%. 6 beat PAT. PVCs 0.5% burden     Assessment and plan:     -H/o SVT 1995- Holter 1/12/23-Frequent PACs 1.4%. 6 beat PAT. PVCs 0.5% burden. Continue BB. Liberalize po fluid intake 20oz a day.  -Minimal MVP per ECHO   -Hyperlipidemia- Pt refusing statin today. Diet and exercise recommended.  -AVM of lower face- s/p sclerotherapy  -S/p tracheostomy  -JOSIE-improved after AVM surgery  -RTC 6 months.    Sohan Moore MD  [facility]  [phone]

## 2024-06-03 ENCOUNTER — HOSPITAL ENCOUNTER (OUTPATIENT)
Age: 74
Discharge: HOME | End: 2024-06-03
Payer: MEDICARE

## 2024-06-03 DIAGNOSIS — I10 HYPERTENSION, UNSPECIFIED TYPE: ICD-10-CM

## 2024-06-03 DIAGNOSIS — E78.5 HYPERLIPIDEMIA, UNSPECIFIED HYPERLIPIDEMIA TYPE: ICD-10-CM

## 2024-06-03 DIAGNOSIS — R73.01 IMPAIRED FASTING BLOOD SUGAR: ICD-10-CM

## 2024-06-03 LAB
ALBUMIN SERPL-MCNC: 4 G/DL (ref 3.5–5.2)
ALBUMIN/GLOB SERPL: 1.3 {RATIO} (ref 1–2.5)
ALP SERPL-CCNC: 71 U/L (ref 35–104)
ALT SERPL-CCNC: 13 U/L (ref 5–33)
ANION GAP SERPL CALCULATED.3IONS-SCNC: 11 MMOL/L (ref 9–17)
AST SERPL-CCNC: 14 U/L
BASOPHILS # BLD: 0.04 K/UL (ref 0–0.2)
BASOPHILS NFR BLD: 1 % (ref 0–2)
BILIRUB SERPL-MCNC: 0.5 MG/DL (ref 0.3–1.2)
BUN SERPL-MCNC: 18 MG/DL (ref 8–23)
BUN/CREAT SERPL: 26 (ref 9–20)
CALCIUM SERPL-MCNC: 9.3 MG/DL (ref 8.6–10.4)
CHLORIDE SERPL-SCNC: 106 MMOL/L (ref 98–107)
CHOLEST SERPL-MCNC: 211 MG/DL (ref 0–199)
CHOLESTEROL/HDL RATIO: 5
CO2 SERPL-SCNC: 26 MMOL/L (ref 20–31)
CREAT SERPL-MCNC: 0.7 MG/DL (ref 0.5–0.9)
EOSINOPHIL # BLD: 0.12 K/UL (ref 0–0.44)
EOSINOPHILS RELATIVE PERCENT: 2 % (ref 1–4)
ERYTHROCYTE [DISTWIDTH] IN BLOOD BY AUTOMATED COUNT: 13.1 % (ref 11.8–14.4)
EST. AVERAGE GLUCOSE BLD GHB EST-MCNC: 126 MG/DL
GFR, ESTIMATED: 90 ML/MIN/1.73M2
GLUCOSE SERPL-MCNC: 100 MG/DL (ref 70–99)
HBA1C MFR BLD: 6 % (ref 4–6)
HCT VFR BLD AUTO: 42.1 % (ref 36.3–47.1)
HDLC SERPL-MCNC: 43 MG/DL
HGB BLD-MCNC: 14 G/DL (ref 11.9–15.1)
IMM GRANULOCYTES # BLD AUTO: <0.03 K/UL (ref 0–0.3)
IMM GRANULOCYTES NFR BLD: 0 %
LDLC SERPL CALC-MCNC: 125 MG/DL (ref 0–100)
LYMPHOCYTES NFR BLD: 1.48 K/UL (ref 1.1–3.7)
LYMPHOCYTES RELATIVE PERCENT: 27 % (ref 24–43)
MCH RBC QN AUTO: 30.8 PG (ref 25.2–33.5)
MCHC RBC AUTO-ENTMCNC: 33.3 G/DL (ref 25.2–33.5)
MCV RBC AUTO: 92.5 FL (ref 82.6–102.9)
MONOCYTES NFR BLD: 0.53 K/UL (ref 0.1–1.2)
MONOCYTES NFR BLD: 10 % (ref 3–12)
NEUTROPHILS NFR BLD: 60 % (ref 36–65)
NEUTS SEG NFR BLD: 3.21 K/UL (ref 1.5–8.1)
NRBC BLD-RTO: 0 PER 100 WBC
PLATELET # BLD AUTO: 201 K/UL (ref 138–453)
PMV BLD AUTO: 9.7 FL (ref 8.1–13.5)
POTASSIUM SERPL-SCNC: 4.5 MMOL/L (ref 3.7–5.3)
PROT SERPL-MCNC: 7 G/DL (ref 6.4–8.3)
RBC # BLD AUTO: 4.55 M/UL (ref 3.95–5.11)
SODIUM SERPL-SCNC: 143 MMOL/L (ref 135–144)
TRIGL SERPL-MCNC: 213 MG/DL
VLDLC SERPL CALC-MCNC: 43 MG/DL
WBC OTHER # BLD: 5.4 K/UL (ref 3.5–11.3)

## 2024-06-03 PROCEDURE — 83036 HEMOGLOBIN GLYCOSYLATED A1C: CPT

## 2024-06-03 PROCEDURE — 80053 COMPREHEN METABOLIC PANEL: CPT

## 2024-06-03 PROCEDURE — 85025 COMPLETE CBC W/AUTO DIFF WBC: CPT

## 2024-06-03 PROCEDURE — 36415 COLL VENOUS BLD VENIPUNCTURE: CPT

## 2024-06-03 PROCEDURE — 80061 LIPID PANEL: CPT

## 2024-06-11 ENCOUNTER — OFFICE VISIT (OUTPATIENT)
Dept: FAMILY MEDICINE CLINIC | Age: 74
End: 2024-06-11
Payer: MEDICARE

## 2024-06-11 VITALS
WEIGHT: 128 LBS | BODY MASS INDEX: 25.13 KG/M2 | HEIGHT: 60 IN | DIASTOLIC BLOOD PRESSURE: 70 MMHG | HEART RATE: 70 BPM | SYSTOLIC BLOOD PRESSURE: 122 MMHG | OXYGEN SATURATION: 97 %

## 2024-06-11 DIAGNOSIS — E78.5 HYPERLIPIDEMIA, UNSPECIFIED HYPERLIPIDEMIA TYPE: ICD-10-CM

## 2024-06-11 DIAGNOSIS — Q27.39 ARTERIOVENOUS MALFORMATION, OTHER SITE: ICD-10-CM

## 2024-06-11 DIAGNOSIS — I10 HYPERTENSION, UNSPECIFIED TYPE: ICD-10-CM

## 2024-06-11 DIAGNOSIS — R73.01 IMPAIRED FASTING BLOOD SUGAR: ICD-10-CM

## 2024-06-11 DIAGNOSIS — Z86.010 HISTORY OF COLON POLYPS: ICD-10-CM

## 2024-06-11 DIAGNOSIS — K57.30 SIGMOID DIVERTICULOSIS: ICD-10-CM

## 2024-06-11 DIAGNOSIS — I10 PRIMARY HYPERTENSION: Primary | ICD-10-CM

## 2024-06-11 DIAGNOSIS — M54.50 CHRONIC BILATERAL LOW BACK PAIN WITHOUT SCIATICA: ICD-10-CM

## 2024-06-11 DIAGNOSIS — R42 DIZZINESS: ICD-10-CM

## 2024-06-11 DIAGNOSIS — G89.29 CHRONIC BILATERAL LOW BACK PAIN WITHOUT SCIATICA: ICD-10-CM

## 2024-06-11 DIAGNOSIS — G47.33 OBSTRUCTIVE SLEEP APNEA: ICD-10-CM

## 2024-06-11 DIAGNOSIS — J30.1 SEASONAL ALLERGIC RHINITIS DUE TO POLLEN: ICD-10-CM

## 2024-06-11 DIAGNOSIS — I47.10 SUPRAVENTRICULAR TACHYCARDIA (HCC): ICD-10-CM

## 2024-06-11 PROCEDURE — 99214 OFFICE O/P EST MOD 30 MIN: CPT | Performed by: FAMILY MEDICINE

## 2024-06-11 PROCEDURE — 3078F DIAST BP <80 MM HG: CPT | Performed by: FAMILY MEDICINE

## 2024-06-11 PROCEDURE — 1036F TOBACCO NON-USER: CPT | Performed by: FAMILY MEDICINE

## 2024-06-11 PROCEDURE — G8419 CALC BMI OUT NRM PARAM NOF/U: HCPCS | Performed by: FAMILY MEDICINE

## 2024-06-11 PROCEDURE — G8427 DOCREV CUR MEDS BY ELIG CLIN: HCPCS | Performed by: FAMILY MEDICINE

## 2024-06-11 PROCEDURE — G8399 PT W/DXA RESULTS DOCUMENT: HCPCS | Performed by: FAMILY MEDICINE

## 2024-06-11 PROCEDURE — 3074F SYST BP LT 130 MM HG: CPT | Performed by: FAMILY MEDICINE

## 2024-06-11 PROCEDURE — 1090F PRES/ABSN URINE INCON ASSESS: CPT | Performed by: FAMILY MEDICINE

## 2024-06-11 PROCEDURE — 1123F ACP DISCUSS/DSCN MKR DOCD: CPT | Performed by: FAMILY MEDICINE

## 2024-06-11 PROCEDURE — 99213 OFFICE O/P EST LOW 20 MIN: CPT | Performed by: FAMILY MEDICINE

## 2024-06-11 SDOH — ECONOMIC STABILITY: HOUSING INSECURITY
IN THE LAST 12 MONTHS, WAS THERE A TIME WHEN YOU DID NOT HAVE A STEADY PLACE TO SLEEP OR SLEPT IN A SHELTER (INCLUDING NOW)?: NO

## 2024-06-11 SDOH — ECONOMIC STABILITY: FOOD INSECURITY: WITHIN THE PAST 12 MONTHS, THE FOOD YOU BOUGHT JUST DIDN'T LAST AND YOU DIDN'T HAVE MONEY TO GET MORE.: NEVER TRUE

## 2024-06-11 SDOH — ECONOMIC STABILITY: FOOD INSECURITY: WITHIN THE PAST 12 MONTHS, YOU WORRIED THAT YOUR FOOD WOULD RUN OUT BEFORE YOU GOT MONEY TO BUY MORE.: NEVER TRUE

## 2024-06-11 SDOH — ECONOMIC STABILITY: INCOME INSECURITY: HOW HARD IS IT FOR YOU TO PAY FOR THE VERY BASICS LIKE FOOD, HOUSING, MEDICAL CARE, AND HEATING?: NOT HARD AT ALL

## 2024-06-11 ASSESSMENT — PATIENT HEALTH QUESTIONNAIRE - PHQ9
SUM OF ALL RESPONSES TO PHQ QUESTIONS 1-9: 0
1. LITTLE INTEREST OR PLEASURE IN DOING THINGS: NOT AT ALL
SUM OF ALL RESPONSES TO PHQ QUESTIONS 1-9: 0
SUM OF ALL RESPONSES TO PHQ9 QUESTIONS 1 & 2: 0
2. FEELING DOWN, DEPRESSED OR HOPELESS: NOT AT ALL

## 2024-06-11 ASSESSMENT — ENCOUNTER SYMPTOMS
RESPIRATORY NEGATIVE: 1
EYES NEGATIVE: 1
CONSTIPATION: 0
GASTROINTESTINAL NEGATIVE: 1

## 2024-06-11 NOTE — PROGRESS NOTES
WBC Final    Neutrophils % 06/03/2024 60  36 - 65 % Final    Lymphocytes % 06/03/2024 27  24 - 43 % Final    Monocytes % 06/03/2024 10  3 - 12 % Final    Eosinophils % 06/03/2024 2  1 - 4 % Final    Basophils % 06/03/2024 1  0 - 2 % Final    Immature Granulocytes % 06/03/2024 0  0 % Final    Neutrophils Absolute 06/03/2024 3.21  1.50 - 8.10 k/uL Final    Lymphocytes Absolute 06/03/2024 1.48  1.10 - 3.70 k/uL Final    Monocytes Absolute 06/03/2024 0.53  0.10 - 1.20 k/uL Final    Eosinophils Absolute 06/03/2024 0.12  0.00 - 0.44 k/uL Final    Basophils Absolute 06/03/2024 0.04  0.00 - 0.20 k/uL Final    Immature Granulocytes Absolute 06/03/2024 <0.03  0.00 - 0.30 k/uL Final    Sodium 06/03/2024 143  135 - 144 mmol/L Final    Potassium 06/03/2024 4.5  3.7 - 5.3 mmol/L Final    Chloride 06/03/2024 106  98 - 107 mmol/L Final    CO2 06/03/2024 26  20 - 31 mmol/L Final    Anion Gap 06/03/2024 11  9 - 17 mmol/L Final    Glucose 06/03/2024 100 (H)  70 - 99 mg/dL Final    BUN 06/03/2024 18  8 - 23 mg/dL Final    Creatinine 06/03/2024 0.7  0.5 - 0.9 mg/dL Final    Est, Glom Filt Rate 06/03/2024 90  >60 mL/min/1.73m2 Final    BUN/Creatinine Ratio 06/03/2024 26 (H)  9 - 20 Final    Calcium 06/03/2024 9.3  8.6 - 10.4 mg/dL Final    Total Protein 06/03/2024 7.0  6.4 - 8.3 g/dL Final    Albumin 06/03/2024 4.0  3.5 - 5.2 g/dL Final    Albumin/Globulin Ratio 06/03/2024 1.3  1.0 - 2.5 Final    Total Bilirubin 06/03/2024 0.5  0.3 - 1.2 mg/dL Final    Alkaline Phosphatase 06/03/2024 71  35 - 104 U/L Final    ALT 06/03/2024 13  5 - 33 U/L Final    AST 06/03/2024 14  <32 U/L Final    Cholesterol, Total 06/03/2024 211 (H)  0 - 199 mg/dL Final    HDL 06/03/2024 43  >40 mg/dL Final    LDL Cholesterol 06/03/2024 125 (H)  0 - 100 mg/dL Final    Chol/HDL Ratio 06/03/2024 5.0   Final    Triglycerides 06/03/2024 213 (H)  <150 mg/dL Final    VLDL 06/03/2024 43  mg/dL Final       Assessment:          Encounter Diagnoses   Name Primary?

## 2024-06-11 NOTE — PATIENT INSTRUCTIONS
Hospital Outpatient Visit on 06/03/2024   Component Date Value Ref Range Status    Hemoglobin A1C 06/03/2024 6.0  4.0 - 6.0 % Final    Estimated Avg Glucose 06/03/2024 126  mg/dL Final    Comment: The ADA and AACC recommend providing the estimated average glucose result to permit better   patient understanding of their HBA1c result.      WBC 06/03/2024 5.4  3.5 - 11.3 k/uL Final    RBC 06/03/2024 4.55  3.95 - 5.11 m/uL Final    Hemoglobin 06/03/2024 14.0  11.9 - 15.1 g/dL Final    Hematocrit 06/03/2024 42.1  36.3 - 47.1 % Final    MCV 06/03/2024 92.5  82.6 - 102.9 fL Final    MCH 06/03/2024 30.8  25.2 - 33.5 pg Final    MCHC 06/03/2024 33.3  25.2 - 33.5 g/dL Final    RDW 06/03/2024 13.1  11.8 - 14.4 % Final    Platelets 06/03/2024 201  138 - 453 k/uL Final    MPV 06/03/2024 9.7  8.1 - 13.5 fL Final    NRBC Automated 06/03/2024 0.0  0.0 per 100 WBC Final    Neutrophils % 06/03/2024 60  36 - 65 % Final    Lymphocytes % 06/03/2024 27  24 - 43 % Final    Monocytes % 06/03/2024 10  3 - 12 % Final    Eosinophils % 06/03/2024 2  1 - 4 % Final    Basophils % 06/03/2024 1  0 - 2 % Final    Immature Granulocytes % 06/03/2024 0  0 % Final    Neutrophils Absolute 06/03/2024 3.21  1.50 - 8.10 k/uL Final    Lymphocytes Absolute 06/03/2024 1.48  1.10 - 3.70 k/uL Final    Monocytes Absolute 06/03/2024 0.53  0.10 - 1.20 k/uL Final    Eosinophils Absolute 06/03/2024 0.12  0.00 - 0.44 k/uL Final    Basophils Absolute 06/03/2024 0.04  0.00 - 0.20 k/uL Final    Immature Granulocytes Absolute 06/03/2024 <0.03  0.00 - 0.30 k/uL Final    Sodium 06/03/2024 143  135 - 144 mmol/L Final    Potassium 06/03/2024 4.5  3.7 - 5.3 mmol/L Final    Chloride 06/03/2024 106  98 - 107 mmol/L Final    CO2 06/03/2024 26  20 - 31 mmol/L Final    Anion Gap 06/03/2024 11  9 - 17 mmol/L Final    Glucose 06/03/2024 100 (H)  70 - 99 mg/dL Final    BUN 06/03/2024 18  8 - 23 mg/dL Final    Creatinine 06/03/2024 0.7  0.5 - 0.9 mg/dL Final    Est, Glom Filt Rate

## 2024-06-13 ENCOUNTER — TELEMEDICINE (OUTPATIENT)
Dept: FAMILY MEDICINE CLINIC | Age: 74
End: 2024-06-13

## 2024-06-13 DIAGNOSIS — Z00.00 MEDICARE ANNUAL WELLNESS VISIT, SUBSEQUENT: Primary | ICD-10-CM

## 2024-06-13 RX ORDER — LORATADINE 10 MG/1
10 TABLET ORAL DAILY
COMMUNITY

## 2024-06-13 ASSESSMENT — PATIENT HEALTH QUESTIONNAIRE - PHQ9
SUM OF ALL RESPONSES TO PHQ QUESTIONS 1-9: 0
SUM OF ALL RESPONSES TO PHQ QUESTIONS 1-9: 0
SUM OF ALL RESPONSES TO PHQ9 QUESTIONS 1 & 2: 0
2. FEELING DOWN, DEPRESSED OR HOPELESS: NOT AT ALL
1. LITTLE INTEREST OR PLEASURE IN DOING THINGS: NOT AT ALL
SUM OF ALL RESPONSES TO PHQ QUESTIONS 1-9: 0
SUM OF ALL RESPONSES TO PHQ QUESTIONS 1-9: 0

## 2024-06-13 NOTE — PROGRESS NOTES
evaluation under the direct supervision of the attending physician.    Roseann S Beni, was evaluated through a synchronous (real-time) telephone encounter. The patient (or guardian if applicable) is aware that this is a billable service, which includes applicable co-pays. This Virtual Visit was conducted with patient's (and/or legal guardian's) consent. Patient identification was verified, and a caregiver was present when appropriate.   The patient was located at Home: [address] [place] [address]  Provider was located at Facility (Appt Dept): [facility] [place] [address]  Confirm you are appropriately licensed, registered, or certified to deliver care in the state where the patient is located as indicated above. If you are not or unsure, please re-schedule the visit: Yes, I confirm.     This encounter was performed under my, Finn Jaimes MD’s, direct supervision, 6/13/2024.  Electronically signed by Finn Jaimes MD on 6/13/2024 at 2:43 PM

## 2024-06-13 NOTE — PATIENT INSTRUCTIONS
attack. These may include:    Chest pain or pressure, or a strange feeling in the chest.     Sweating.     Shortness of breath.     Pain, pressure, or a strange feeling in the back, neck, jaw, or upper belly or in one or both shoulders or arms.     Lightheadedness or sudden weakness.     A fast or irregular heartbeat.   After you call 911, the  may tell you to chew 1 adult-strength or 2 to 4 low-dose aspirin. Wait for an ambulance. Do not try to drive yourself.  Watch closely for changes in your health, and be sure to contact your doctor if you have any problems.  Where can you learn more?  Go to https://www.[employer].net/patientEd and enter F075 to learn more about \"A Healthy Heart: Care Instructions.\"  Current as of: June 24, 2023  Content Version: 14.1  © 5756-3418 [employer].   Care instructions adapted under license by [employer]. If you have questions about a medical condition or this instruction, always ask your healthcare professional. [employer] disclaims any warranty or liability for your use of this information.      Personalized Preventive Plan for Roseann Bazan - 6/13/2024  Medicare offers a range of preventive health benefits. Some of the tests and screenings are paid in full while other may be subject to a deductible, co-insurance, and/or copay.    Some of these benefits include a comprehensive review of your medical history including lifestyle, illnesses that may run in your family, and various assessments and screenings as appropriate.    After reviewing your medical record and screening and assessments performed today your provider may have ordered immunizations, labs, imaging, and/or referrals for you.  A list of these orders (if applicable) as well as your Preventive Care list are included within your After Visit Summary for your review.    Other Preventive Recommendations:    A preventive eye exam performed by an eye specialist is recommended every 1-2

## 2024-07-31 ENCOUNTER — OFFICE VISIT (OUTPATIENT)
Dept: CARDIOLOGY | Age: 74
End: 2024-07-31
Payer: MEDICARE

## 2024-07-31 VITALS
DIASTOLIC BLOOD PRESSURE: 66 MMHG | WEIGHT: 129 LBS | HEIGHT: 60 IN | SYSTOLIC BLOOD PRESSURE: 120 MMHG | BODY MASS INDEX: 25.32 KG/M2 | HEART RATE: 64 BPM

## 2024-07-31 DIAGNOSIS — G47.33 OSA (OBSTRUCTIVE SLEEP APNEA): ICD-10-CM

## 2024-07-31 DIAGNOSIS — I47.19 PAT (PAROXYSMAL ATRIAL TACHYCARDIA) (HCC): Primary | ICD-10-CM

## 2024-07-31 DIAGNOSIS — E78.00 PURE HYPERCHOLESTEROLEMIA: ICD-10-CM

## 2024-07-31 DIAGNOSIS — I10 HYPERTENSION, ESSENTIAL: ICD-10-CM

## 2024-07-31 DIAGNOSIS — I34.1 MITRAL VALVE PROLAPSE: ICD-10-CM

## 2024-07-31 DIAGNOSIS — I47.10 SVT (SUPRAVENTRICULAR TACHYCARDIA) (HCC): ICD-10-CM

## 2024-07-31 PROCEDURE — G8427 DOCREV CUR MEDS BY ELIG CLIN: HCPCS | Performed by: INTERNAL MEDICINE

## 2024-07-31 PROCEDURE — 1090F PRES/ABSN URINE INCON ASSESS: CPT | Performed by: INTERNAL MEDICINE

## 2024-07-31 PROCEDURE — 3078F DIAST BP <80 MM HG: CPT | Performed by: INTERNAL MEDICINE

## 2024-07-31 PROCEDURE — 3074F SYST BP LT 130 MM HG: CPT | Performed by: INTERNAL MEDICINE

## 2024-07-31 PROCEDURE — G8419 CALC BMI OUT NRM PARAM NOF/U: HCPCS | Performed by: INTERNAL MEDICINE

## 2024-07-31 PROCEDURE — 99212 OFFICE O/P EST SF 10 MIN: CPT | Performed by: INTERNAL MEDICINE

## 2024-07-31 PROCEDURE — 99214 OFFICE O/P EST MOD 30 MIN: CPT | Performed by: INTERNAL MEDICINE

## 2024-07-31 PROCEDURE — 93010 ELECTROCARDIOGRAM REPORT: CPT | Performed by: INTERNAL MEDICINE

## 2024-07-31 PROCEDURE — 1036F TOBACCO NON-USER: CPT | Performed by: INTERNAL MEDICINE

## 2024-07-31 PROCEDURE — G8399 PT W/DXA RESULTS DOCUMENT: HCPCS | Performed by: INTERNAL MEDICINE

## 2024-07-31 PROCEDURE — 93005 ELECTROCARDIOGRAM TRACING: CPT | Performed by: INTERNAL MEDICINE

## 2024-07-31 PROCEDURE — 1123F ACP DISCUSS/DSCN MKR DOCD: CPT | Performed by: INTERNAL MEDICINE

## 2024-07-31 NOTE — PROGRESS NOTES
Today's Date: 7/31/2024  Patient's Name: Roseann Bazan  Patient's age: 74 y.o., 1950    HPI and CC:  Roseann Bazan is being seen in clinic today regarding   Chief Complaint   Patient presents with    Valvular Heart Disease    Hypertension    Hyperlipidemia     she is doing well from a cardiac standpoint. No chest pain, no dyspnea, no PND, no syncope or pre-syncope, no orthopnea. She denies any palpitations.    Past Medical History:   has a past medical history of Allergic rhinitis, GERD (gastroesophageal reflux disease), Hemangioma of face, Impaired fasting blood sugar, Internal hemorrhoids, Irritable bowel syndrome, Menometrorrhagia, Mitral valve prolapse, Obstructive sleep apnea, Osteoarthritis, Sigmoid diverticulosis, and Supraventricular tachycardia (HCC).    Past Surgical History:   has a past surgical history that includes Cholecystectomy (03/20/2000); Mouth surgery; Tonsillectomy; Upper gastrointestinal endoscopy (01/31/2014); Colonoscopy (06/12/2009); Colonoscopy (11/19/2014); Cataract removal with implant (Left, 11/08/2016); Cataract removal with implant (12/06/2016); tracheostomy (11/14/2017); Gastrostomy tube placement (01/08/2018); other surgical history; Breast biopsy (Right, 11/18/1987); Hysterectomy (05/01/2000); Ovary removal (Bilateral, 05/2000); and Colonoscopy (N/A, 7/22/2021).    Home Medications:  Prior to Admission medications    Medication Sig Start Date End Date Taking? Authorizing Provider   loratadine (CLARITIN) 10 MG tablet Take 1 tablet by mouth daily   Yes Ehsan Willis MD   metoprolol succinate (TOPROL XL) 25 MG extended release tablet Take 1.5 tablets by mouth daily 1/31/24  Yes Sohan Moore MD   Calcium Carbonate-Vit D-Min (CALTRATE PLUS PO) Take by mouth Chocolate chews   Yes Ehsan Willis MD   docusate sodium (COLACE) 100 MG capsule Take 1 capsule by mouth as needed for Constipation   Yes Ehsan Willis MD       Allergies:  Sulfa

## 2024-08-27 ENCOUNTER — OFFICE VISIT (OUTPATIENT)
Dept: PODIATRY | Age: 74
End: 2024-08-27
Payer: MEDICARE

## 2024-08-27 VITALS
HEART RATE: 64 BPM | RESPIRATION RATE: 20 BRPM | BODY MASS INDEX: 25.62 KG/M2 | SYSTOLIC BLOOD PRESSURE: 120 MMHG | DIASTOLIC BLOOD PRESSURE: 68 MMHG | WEIGHT: 131.2 LBS

## 2024-08-27 DIAGNOSIS — M79.675 TOE PAIN, BILATERAL: ICD-10-CM

## 2024-08-27 DIAGNOSIS — M79.674 TOE PAIN, BILATERAL: ICD-10-CM

## 2024-08-27 DIAGNOSIS — M20.10 HAV (HALLUX ABDUCTO VALGUS), UNSPECIFIED LATERALITY: Primary | ICD-10-CM

## 2024-08-27 DIAGNOSIS — B35.1 DERMATOPHYTOSIS OF NAIL: ICD-10-CM

## 2024-08-27 PROCEDURE — 1123F ACP DISCUSS/DSCN MKR DOCD: CPT | Performed by: PODIATRIST

## 2024-08-27 PROCEDURE — G8419 CALC BMI OUT NRM PARAM NOF/U: HCPCS | Performed by: PODIATRIST

## 2024-08-27 PROCEDURE — G8399 PT W/DXA RESULTS DOCUMENT: HCPCS | Performed by: PODIATRIST

## 2024-08-27 PROCEDURE — 99203 OFFICE O/P NEW LOW 30 MIN: CPT | Performed by: PODIATRIST

## 2024-08-27 PROCEDURE — G8427 DOCREV CUR MEDS BY ELIG CLIN: HCPCS | Performed by: PODIATRIST

## 2024-08-27 PROCEDURE — 11720 DEBRIDE NAIL 1-5: CPT | Performed by: PODIATRIST

## 2024-08-27 PROCEDURE — 1036F TOBACCO NON-USER: CPT | Performed by: PODIATRIST

## 2024-08-27 PROCEDURE — 1090F PRES/ABSN URINE INCON ASSESS: CPT | Performed by: PODIATRIST

## 2024-08-27 PROCEDURE — 3078F DIAST BP <80 MM HG: CPT | Performed by: PODIATRIST

## 2024-08-27 PROCEDURE — 3074F SYST BP LT 130 MM HG: CPT | Performed by: PODIATRIST

## 2024-08-27 NOTE — PROGRESS NOTES
Foot Care Worksheet  PCP: Finn Jaimes MD  Last visit: 06 / 11 / 2024    Nail description: Thick , Yellow , Crumbly , Marked limitation of ambulation     Pain resulting from thickened and dystrophy of nail plate Yes    Nails involved  Right   2  (T5-T9)  Left     2  (TA-T4)    Q7 1 Class A Finding - Non traumatic amputation of foot No    Q8 2 Class B Findings - Absent DP pulse No, Absent PT pulse No, Advanced tropic changes (3 required) Yes    Decrease hair growth Yes, Nail changes/thickening Yes, Pigmented changes/discoloration Yes, Skin texture (thin, shiny) No, Skin color (rubor/redness) No    Q9 1 Class B and 2 Class C Findings  Claudication No, Temperature change Yes, Paresthesia No, Burning No, Edema Yes    Subjective:  Roseann Bazan is a 74 y.o. female who presents to the office today complaining of pain in the nails bilateral..  Symptoms began  month(s) ago.  Patient relates pain is Present.  Pain is rated 2 out of 10 and is described as intermittent.  Treatments prior to today's visit include: none.  Currently denies F/C/N/V.     Allergies   Allergen Reactions    Sulfa Antibiotics Swelling    Cephalexin Other (See Comments)     Very sleepy and sick after taking it     Ciprofloxacin      Does not set well with taste d/t g-tube placement    E-Mycin [Erythromycin] Nausea And Vomiting    Penicillins      Childhood.       Past Medical History:   Diagnosis Date    Allergic rhinitis     GERD (gastroesophageal reflux disease)     Hemangioma of face     And arm.    Impaired fasting blood sugar     Internal hemorrhoids     Irritable bowel syndrome     Menometrorrhagia     Severe.    Mitral valve prolapse     Obstructive sleep apnea     non compliant with cpap    Osteoarthritis     Sigmoid diverticulosis     Supraventricular tachycardia (HCC)        Prior to Admission medications    Medication Sig Start Date End Date Taking? Authorizing Provider   loratadine (CLARITIN) 10 MG tablet Take 1 tablet by mouth daily

## 2024-10-08 ENCOUNTER — IMMUNIZATION (OUTPATIENT)
Dept: LAB | Age: 74
End: 2024-10-08
Payer: MEDICARE

## 2024-10-08 PROCEDURE — PBSHW INFLUENZA, FLUAD TRIVALENT, (AGE 65 Y+), IM, PRESERVATIVE FREE, 0.5ML: Performed by: FAMILY MEDICINE

## 2024-10-08 PROCEDURE — 90653 IIV ADJUVANT VACCINE IM: CPT | Performed by: FAMILY MEDICINE

## 2024-11-05 ENCOUNTER — OFFICE VISIT (OUTPATIENT)
Dept: PODIATRY | Age: 74
End: 2024-11-05
Payer: MEDICARE

## 2024-11-05 VITALS
HEART RATE: 64 BPM | DIASTOLIC BLOOD PRESSURE: 68 MMHG | SYSTOLIC BLOOD PRESSURE: 120 MMHG | WEIGHT: 132.4 LBS | BODY MASS INDEX: 25.86 KG/M2 | RESPIRATION RATE: 20 BRPM

## 2024-11-05 DIAGNOSIS — M79.675 PAIN IN TOES OF BOTH FEET: ICD-10-CM

## 2024-11-05 DIAGNOSIS — L84 CORNS AND CALLOSITIES: ICD-10-CM

## 2024-11-05 DIAGNOSIS — M79.674 PAIN IN TOES OF BOTH FEET: ICD-10-CM

## 2024-11-05 DIAGNOSIS — B35.1 DERMATOPHYTOSIS OF NAIL: Primary | ICD-10-CM

## 2024-11-05 PROCEDURE — 99999 PR OFFICE/OUTPT VISIT,PROCEDURE ONLY: CPT | Performed by: PODIATRIST

## 2024-11-05 PROCEDURE — 11056 PARNG/CUTG B9 HYPRKR LES 2-4: CPT | Performed by: PODIATRIST

## 2024-11-05 PROCEDURE — 11720 DEBRIDE NAIL 1-5: CPT | Performed by: PODIATRIST

## 2024-11-05 NOTE — PROGRESS NOTES
Foot Care Worksheet  PCP: Finn Jaimes MD  Last visit: 06 / 11 / 2024    Nail description: Thick , Yellow , Crumbly , Marked limitation of ambulation     Pain resulting from thickened and dystrophy of nail plate Yes    Nails involved  Right   2  (T5-T9)  Left     2  (TA-T4)    Q7 1 Class A Finding - Non traumatic amputation of foot No    Q8 2 Class B Findings - Absent DP pulse No, Absent PT pulse No, Advanced tropic changes (3 required) Yes    Decrease hair growth Yes, Nail changes/thickening Yes, Pigmented changes/discoloration Yes, Skin texture (thin, shiny) No, Skin color (rubor/redness) No    Q9 1 Class B and 2 Class C Findings  Claudication No, Temperature change Yes, Paresthesia No, Burning No, Edema Yes    Subjective:  Roseann Bazan is a 74 y.o. female who presents to the office today for foot care.  Pain at nails and callous.  Currently denies F/C/N/V.     Allergies   Allergen Reactions    Sulfa Antibiotics Swelling    Cephalexin Other (See Comments)     Very sleepy and sick after taking it     Ciprofloxacin      Does not set well with taste d/t g-tube placement    E-Mycin [Erythromycin] Nausea And Vomiting    Penicillins      Childhood.       Past Medical History:   Diagnosis Date    Allergic rhinitis     GERD (gastroesophageal reflux disease)     Hemangioma of face     And arm.    Impaired fasting blood sugar     Internal hemorrhoids     Irritable bowel syndrome     Menometrorrhagia     Severe.    Mitral valve prolapse     Obstructive sleep apnea     non compliant with cpap    Osteoarthritis     Sigmoid diverticulosis     Supraventricular tachycardia (HCC)        Prior to Admission medications    Medication Sig Start Date End Date Taking? Authorizing Provider   loratadine (CLARITIN) 10 MG tablet Take 1 tablet by mouth daily   Yes Provider, MD Ehsan   metoprolol succinate (TOPROL XL) 25 MG extended release tablet Take 1.5 tablets by mouth daily 1/31/24  Yes Sohan Moore MD   Calcium

## 2024-12-09 ENCOUNTER — HOSPITAL ENCOUNTER (OUTPATIENT)
Age: 74
Discharge: HOME | End: 2024-12-09
Payer: MEDICARE

## 2024-12-09 DIAGNOSIS — I10 PRIMARY HYPERTENSION: ICD-10-CM

## 2024-12-09 DIAGNOSIS — R73.01 IMPAIRED FASTING BLOOD SUGAR: ICD-10-CM

## 2024-12-09 DIAGNOSIS — E78.5 HYPERLIPIDEMIA, UNSPECIFIED HYPERLIPIDEMIA TYPE: ICD-10-CM

## 2024-12-09 LAB
ALBUMIN SERPL-MCNC: 4.2 G/DL (ref 3.5–5.2)
ALBUMIN/GLOB SERPL: 1.3 {RATIO} (ref 1–2.5)
ALP SERPL-CCNC: 78 U/L (ref 35–104)
ALT SERPL-CCNC: 17 U/L (ref 5–33)
ANION GAP SERPL CALCULATED.3IONS-SCNC: 11 MMOL/L (ref 9–17)
AST SERPL-CCNC: 18 U/L
BASOPHILS # BLD: 0.04 K/UL (ref 0–0.2)
BASOPHILS NFR BLD: 1 % (ref 0–2)
BILIRUB SERPL-MCNC: 0.5 MG/DL (ref 0.3–1.2)
BUN SERPL-MCNC: 21 MG/DL (ref 8–23)
BUN/CREAT SERPL: 26 (ref 9–20)
CALCIUM SERPL-MCNC: 8.9 MG/DL (ref 8.6–10.4)
CHLORIDE SERPL-SCNC: 106 MMOL/L (ref 98–107)
CHOLEST SERPL-MCNC: 227 MG/DL (ref 0–199)
CHOLESTEROL/HDL RATIO: 4.9
CO2 SERPL-SCNC: 27 MMOL/L (ref 20–31)
CREAT SERPL-MCNC: 0.8 MG/DL (ref 0.5–0.9)
EOSINOPHIL # BLD: 0.21 K/UL (ref 0–0.44)
EOSINOPHILS RELATIVE PERCENT: 4 % (ref 1–4)
ERYTHROCYTE [DISTWIDTH] IN BLOOD BY AUTOMATED COUNT: 13.2 % (ref 11.8–14.4)
EST. AVERAGE GLUCOSE BLD GHB EST-MCNC: 126 MG/DL
GFR, ESTIMATED: 76 ML/MIN/1.73M2
GLUCOSE SERPL-MCNC: 99 MG/DL (ref 70–99)
HBA1C MFR BLD: 6 % (ref 4–6)
HCT VFR BLD AUTO: 40.2 % (ref 36.3–47.1)
HDLC SERPL-MCNC: 46 MG/DL
HGB BLD-MCNC: 13.3 G/DL (ref 11.9–15.1)
IMM GRANULOCYTES # BLD AUTO: <0.03 K/UL (ref 0–0.3)
IMM GRANULOCYTES NFR BLD: 0 %
LDLC SERPL CALC-MCNC: 152 MG/DL (ref 0–100)
LYMPHOCYTES NFR BLD: 2.02 K/UL (ref 1.1–3.7)
LYMPHOCYTES RELATIVE PERCENT: 34 % (ref 24–43)
MCH RBC QN AUTO: 30.8 PG (ref 25.2–33.5)
MCHC RBC AUTO-ENTMCNC: 33.1 G/DL (ref 25.2–33.5)
MCV RBC AUTO: 93.1 FL (ref 82.6–102.9)
MONOCYTES NFR BLD: 0.58 K/UL (ref 0.1–1.2)
MONOCYTES NFR BLD: 10 % (ref 3–12)
NEUTROPHILS NFR BLD: 51 % (ref 36–65)
NEUTS SEG NFR BLD: 3.13 K/UL (ref 1.5–8.1)
NRBC BLD-RTO: 0 PER 100 WBC
PLATELET # BLD AUTO: 222 K/UL (ref 138–453)
PMV BLD AUTO: 9.7 FL (ref 8.1–13.5)
POTASSIUM SERPL-SCNC: 4.4 MMOL/L (ref 3.7–5.3)
PROT SERPL-MCNC: 7.5 G/DL (ref 6.4–8.3)
RBC # BLD AUTO: 4.32 M/UL (ref 3.95–5.11)
SODIUM SERPL-SCNC: 144 MMOL/L (ref 135–144)
TRIGL SERPL-MCNC: 143 MG/DL
VLDLC SERPL CALC-MCNC: 29 MG/DL (ref 1–30)
WBC OTHER # BLD: 6 K/UL (ref 3.5–11.3)

## 2024-12-09 PROCEDURE — 85025 COMPLETE CBC W/AUTO DIFF WBC: CPT

## 2024-12-09 PROCEDURE — 80061 LIPID PANEL: CPT

## 2024-12-09 PROCEDURE — 80053 COMPREHEN METABOLIC PANEL: CPT

## 2024-12-09 PROCEDURE — 83036 HEMOGLOBIN GLYCOSYLATED A1C: CPT

## 2024-12-09 PROCEDURE — 36415 COLL VENOUS BLD VENIPUNCTURE: CPT

## 2024-12-19 ENCOUNTER — OFFICE VISIT (OUTPATIENT)
Dept: FAMILY MEDICINE CLINIC | Age: 74
End: 2024-12-19
Payer: MEDICARE

## 2024-12-19 VITALS
HEART RATE: 71 BPM | BODY MASS INDEX: 25.64 KG/M2 | SYSTOLIC BLOOD PRESSURE: 128 MMHG | WEIGHT: 130.6 LBS | DIASTOLIC BLOOD PRESSURE: 78 MMHG | OXYGEN SATURATION: 99 % | HEIGHT: 60 IN

## 2024-12-19 DIAGNOSIS — G47.33 OBSTRUCTIVE SLEEP APNEA: ICD-10-CM

## 2024-12-19 DIAGNOSIS — M54.50 CHRONIC BILATERAL LOW BACK PAIN WITHOUT SCIATICA: ICD-10-CM

## 2024-12-19 DIAGNOSIS — E78.5 HYPERLIPIDEMIA, UNSPECIFIED HYPERLIPIDEMIA TYPE: ICD-10-CM

## 2024-12-19 DIAGNOSIS — G89.29 CHRONIC BILATERAL LOW BACK PAIN WITHOUT SCIATICA: ICD-10-CM

## 2024-12-19 DIAGNOSIS — I47.10 SUPRAVENTRICULAR TACHYCARDIA (HCC): ICD-10-CM

## 2024-12-19 DIAGNOSIS — R42 DIZZINESS: ICD-10-CM

## 2024-12-19 DIAGNOSIS — K57.30 SIGMOID DIVERTICULOSIS: ICD-10-CM

## 2024-12-19 DIAGNOSIS — I10 PRIMARY HYPERTENSION: ICD-10-CM

## 2024-12-19 DIAGNOSIS — J30.1 SEASONAL ALLERGIC RHINITIS DUE TO POLLEN: ICD-10-CM

## 2024-12-19 DIAGNOSIS — Z86.0100 HISTORY OF COLON POLYPS: ICD-10-CM

## 2024-12-19 DIAGNOSIS — Q27.39 ARTERIOVENOUS MALFORMATION, OTHER SITE: ICD-10-CM

## 2024-12-19 DIAGNOSIS — R73.01 IMPAIRED FASTING BLOOD SUGAR: Primary | ICD-10-CM

## 2024-12-19 PROCEDURE — 1123F ACP DISCUSS/DSCN MKR DOCD: CPT | Performed by: FAMILY MEDICINE

## 2024-12-19 PROCEDURE — G8399 PT W/DXA RESULTS DOCUMENT: HCPCS | Performed by: FAMILY MEDICINE

## 2024-12-19 PROCEDURE — 1090F PRES/ABSN URINE INCON ASSESS: CPT | Performed by: FAMILY MEDICINE

## 2024-12-19 PROCEDURE — 1036F TOBACCO NON-USER: CPT | Performed by: FAMILY MEDICINE

## 2024-12-19 PROCEDURE — 3078F DIAST BP <80 MM HG: CPT | Performed by: FAMILY MEDICINE

## 2024-12-19 PROCEDURE — 1159F MED LIST DOCD IN RCRD: CPT | Performed by: FAMILY MEDICINE

## 2024-12-19 PROCEDURE — G8427 DOCREV CUR MEDS BY ELIG CLIN: HCPCS | Performed by: FAMILY MEDICINE

## 2024-12-19 PROCEDURE — G8419 CALC BMI OUT NRM PARAM NOF/U: HCPCS | Performed by: FAMILY MEDICINE

## 2024-12-19 PROCEDURE — G8482 FLU IMMUNIZE ORDER/ADMIN: HCPCS | Performed by: FAMILY MEDICINE

## 2024-12-19 PROCEDURE — 3074F SYST BP LT 130 MM HG: CPT | Performed by: FAMILY MEDICINE

## 2024-12-19 PROCEDURE — 99214 OFFICE O/P EST MOD 30 MIN: CPT | Performed by: FAMILY MEDICINE

## 2024-12-19 RX ORDER — ATORVASTATIN CALCIUM 40 MG/1
40 TABLET, FILM COATED ORAL DAILY
Qty: 90 TABLET | Refills: 3 | Status: SHIPPED | OUTPATIENT
Start: 2024-12-19

## 2024-12-19 ASSESSMENT — ENCOUNTER SYMPTOMS
GASTROINTESTINAL NEGATIVE: 1
RESPIRATORY NEGATIVE: 1
EYES NEGATIVE: 1
CONSTIPATION: 0

## 2024-12-19 NOTE — PROGRESS NOTES
Negative.    Respiratory: Negative.     Cardiovascular: Negative.    Gastrointestinal: Negative.  Negative for constipation (stool softners, fruit/prunes prn).   Endocrine: Negative.    Genitourinary: Negative.    Musculoskeletal:  Positive for arthralgias (left shoulder/arm) and myalgias (left arm).   Skin: Negative.    Allergic/Immunologic: Positive for environmental allergies.   Neurological: Negative.    Hematological: Negative.    Psychiatric/Behavioral: Negative.         Objective:   Physical Exam  Constitutional:       General: She is not in acute distress.  HENT:      Head: Normocephalic and atraumatic.      Nose: Nose normal.   Eyes:      Pupils: Pupils are equal, round, and reactive to light.   Cardiovascular:      Rate and Rhythm: Normal rate.   Pulmonary:      Effort: Pulmonary effort is normal. No respiratory distress.   Abdominal:      General: There is no distension.   Musculoskeletal:         General: Normal range of motion.   Skin:     General: Skin is warm.      Findings: No rash.   Neurological:      Mental Status: She is alert. Mental status is at baseline.   Psychiatric:         Mood and Affect: Mood normal.         Behavior: Behavior normal.         Thought Content: Thought content normal.         Judgment: Judgment normal.       /78 (Site: Right Upper Arm, Position: Sitting, Cuff Size: Medium Adult)   Pulse 71   Ht 1.524 m (5')   Wt 59.2 kg (130 lb 9.6 oz)   LMP 05/06/2000   SpO2 99%   BMI 25.51 kg/m²     Hospital Outpatient Visit on 12/09/2024   Component Date Value Ref Range Status    Hemoglobin A1C 12/09/2024 6.0  4.0 - 6.0 % Final    Estimated Avg Glucose 12/09/2024 126  mg/dL Final    WBC 12/09/2024 6.0  3.5 - 11.3 k/uL Final    RBC 12/09/2024 4.32  3.95 - 5.11 m/uL Final    Hemoglobin 12/09/2024 13.3  11.9 - 15.1 g/dL Final    Hematocrit 12/09/2024 40.2  36.3 - 47.1 % Final    MCV 12/09/2024 93.1  82.6 - 102.9 fL Final    MCH 12/09/2024 30.8  25.2 - 33.5 pg Final    MCHC

## 2024-12-26 ENCOUNTER — TELEPHONE (OUTPATIENT)
Dept: FAMILY MEDICINE CLINIC | Age: 74
End: 2024-12-26

## 2024-12-26 RX ORDER — EZETIMIBE 10 MG/1
10 TABLET ORAL DAILY
Qty: 30 TABLET | Refills: 5 | Status: SHIPPED | OUTPATIENT
Start: 2024-12-26

## 2024-12-26 NOTE — TELEPHONE ENCOUNTER
Pt states that Dr Jaimes prescribed Atorvastatin that she has not picked up yet, she does not want to take it, she wants to know if Zetia can be an option for her. Please call her back at [phone].

## 2024-12-26 NOTE — TELEPHONE ENCOUNTER
Zetia is an option.   Will not have the same power to lower cholesterol that the statin drugs have.  Ok to prescribe zetia if she is willing to take it.

## 2024-12-26 NOTE — TELEPHONE ENCOUNTER
Spoke to patient, she is willing to take the Zetia. She is aware that it will not work as well to lower cholesterol like the statins will.

## 2025-02-05 DIAGNOSIS — I47.19 PAT (PAROXYSMAL ATRIAL TACHYCARDIA) (HCC): ICD-10-CM

## 2025-02-05 RX ORDER — METOPROLOL SUCCINATE 25 MG/1
TABLET, EXTENDED RELEASE ORAL
Qty: 135 TABLET | Refills: 3 | Status: SHIPPED | OUTPATIENT
Start: 2025-02-05

## 2025-02-25 ENCOUNTER — OFFICE VISIT (OUTPATIENT)
Dept: PODIATRY | Age: 75
End: 2025-02-25
Payer: MEDICARE

## 2025-02-25 VITALS
SYSTOLIC BLOOD PRESSURE: 126 MMHG | WEIGHT: 133 LBS | HEART RATE: 76 BPM | DIASTOLIC BLOOD PRESSURE: 70 MMHG | BODY MASS INDEX: 25.97 KG/M2 | RESPIRATION RATE: 20 BRPM

## 2025-02-25 DIAGNOSIS — L84 CORNS AND CALLOSITIES: ICD-10-CM

## 2025-02-25 DIAGNOSIS — B35.1 DERMATOPHYTOSIS OF NAIL: Primary | ICD-10-CM

## 2025-02-25 DIAGNOSIS — M79.674 PAIN IN TOES OF BOTH FEET: ICD-10-CM

## 2025-02-25 DIAGNOSIS — M79.675 PAIN IN TOES OF BOTH FEET: ICD-10-CM

## 2025-02-25 PROCEDURE — 11720 DEBRIDE NAIL 1-5: CPT | Performed by: PODIATRIST

## 2025-02-25 PROCEDURE — 11056 PARNG/CUTG B9 HYPRKR LES 2-4: CPT | Performed by: PODIATRIST

## 2025-02-25 NOTE — PROGRESS NOTES
Foot Care Worksheet  PCP: Finn Jaimes MD  Last visit: 12 / 19 / 2024    Nail description: Thick , Yellow , Crumbly , Marked limitation of ambulation     Pain resulting from thickened and dystrophy of nail plate Yes    Nails involved  Right   2  (T5-T9)  Left     2  (TA-T4)    Q7 1 Class A Finding - Non traumatic amputation of foot No    Q8 2 Class B Findings - Absent DP pulse No, Absent PT pulse No, Advanced tropic changes (3 required) Yes    Decrease hair growth Yes, Nail changes/thickening Yes, Pigmented changes/discoloration Yes, Skin texture (thin, shiny) No, Skin color (rubor/redness) No    Q9 1 Class B and 2 Class C Findings  Claudication No, Temperature change Yes, Paresthesia No, Burning No, Edema Yes    Subjective:  Roseann Bazan is a 74 y.o. female who presents to the office today for foot care.  Pain at nails and callous.  Currently denies F/C/N/V.     Allergies   Allergen Reactions    Sulfa Antibiotics Swelling    Cephalexin Other (See Comments)     Very sleepy and sick after taking it     Ciprofloxacin      Does not set well with taste d/t g-tube placement    E-Mycin [Erythromycin] Nausea And Vomiting    Penicillins      Childhood.       Past Medical History:   Diagnosis Date    Allergic rhinitis     GERD (gastroesophageal reflux disease)     Hemangioma of face     And arm.    Impaired fasting blood sugar     Internal hemorrhoids     Irritable bowel syndrome     Menometrorrhagia     Severe.    Mitral valve prolapse     Obstructive sleep apnea     non compliant with cpap    Osteoarthritis     Sigmoid diverticulosis     Supraventricular tachycardia        Prior to Admission medications    Medication Sig Start Date End Date Taking? Authorizing Provider   metoprolol succinate (TOPROL XL) 25 MG extended release tablet TAKE 1 AND 1/2 TABLET BY MOUTH DAILY 2/5/25  Yes Tanya Lee APRN - NP   ezetimibe (ZETIA) 10 MG tablet Take 1 tablet by mouth daily 12/26/24  Yes Finn Jaimes MD   atorvastatin

## 2025-03-19 ENCOUNTER — OFFICE VISIT (OUTPATIENT)
Dept: CARDIOLOGY | Age: 75
End: 2025-03-19
Payer: MEDICARE

## 2025-03-19 VITALS
HEART RATE: 72 BPM | DIASTOLIC BLOOD PRESSURE: 66 MMHG | BODY MASS INDEX: 25.97 KG/M2 | SYSTOLIC BLOOD PRESSURE: 118 MMHG | HEIGHT: 60 IN

## 2025-03-19 DIAGNOSIS — I47.10 SVT (SUPRAVENTRICULAR TACHYCARDIA): Primary | ICD-10-CM

## 2025-03-19 DIAGNOSIS — E78.00 PURE HYPERCHOLESTEROLEMIA: ICD-10-CM

## 2025-03-19 DIAGNOSIS — I10 HYPERTENSION, ESSENTIAL: ICD-10-CM

## 2025-03-19 PROCEDURE — G8427 DOCREV CUR MEDS BY ELIG CLIN: HCPCS | Performed by: INTERNAL MEDICINE

## 2025-03-19 PROCEDURE — 1159F MED LIST DOCD IN RCRD: CPT | Performed by: INTERNAL MEDICINE

## 2025-03-19 PROCEDURE — 93005 ELECTROCARDIOGRAM TRACING: CPT | Performed by: INTERNAL MEDICINE

## 2025-03-19 PROCEDURE — G8419 CALC BMI OUT NRM PARAM NOF/U: HCPCS | Performed by: INTERNAL MEDICINE

## 2025-03-19 PROCEDURE — 99214 OFFICE O/P EST MOD 30 MIN: CPT | Performed by: INTERNAL MEDICINE

## 2025-03-19 PROCEDURE — 1090F PRES/ABSN URINE INCON ASSESS: CPT | Performed by: INTERNAL MEDICINE

## 2025-03-19 PROCEDURE — 99212 OFFICE O/P EST SF 10 MIN: CPT | Performed by: INTERNAL MEDICINE

## 2025-03-19 PROCEDURE — G8399 PT W/DXA RESULTS DOCUMENT: HCPCS | Performed by: INTERNAL MEDICINE

## 2025-03-19 PROCEDURE — 1123F ACP DISCUSS/DSCN MKR DOCD: CPT | Performed by: INTERNAL MEDICINE

## 2025-03-19 PROCEDURE — 1036F TOBACCO NON-USER: CPT | Performed by: INTERNAL MEDICINE

## 2025-03-19 PROCEDURE — 3074F SYST BP LT 130 MM HG: CPT | Performed by: INTERNAL MEDICINE

## 2025-03-19 PROCEDURE — 93010 ELECTROCARDIOGRAM REPORT: CPT | Performed by: INTERNAL MEDICINE

## 2025-03-19 PROCEDURE — 3078F DIAST BP <80 MM HG: CPT | Performed by: INTERNAL MEDICINE

## 2025-03-19 RX ORDER — EZETIMIBE 10 MG/1
10 TABLET ORAL DAILY
Qty: 90 TABLET | Refills: 3 | Status: SHIPPED | OUTPATIENT
Start: 2025-03-19

## 2025-03-19 NOTE — PROGRESS NOTES
Today's Date: 3/19/2025  Patient's Name: Roseann Bazan  Patient's age: 75 y.o., 1950    Subjective:  Roseann Bazan is being seen in clinic today regarding SVT and hyperlipidemia    she is doing well from a cardiac standpoint. She reports being fairly active. No chest pain, no dyspnea, no PND, no syncope or pre-syncope, no orthopnea. She denies any palpitations.        Past Medical History:   has a past medical history of Allergic rhinitis, GERD (gastroesophageal reflux disease), Hemangioma of face, Impaired fasting blood sugar, Internal hemorrhoids, Irritable bowel syndrome, Menometrorrhagia, Mitral valve prolapse, Obstructive sleep apnea, Osteoarthritis, Sigmoid diverticulosis, and Supraventricular tachycardia.    Past Surgical History:   has a past surgical history that includes Cholecystectomy (03/20/2000); Mouth surgery; Tonsillectomy; Upper gastrointestinal endoscopy (01/31/2014); Colonoscopy (06/12/2009); Colonoscopy (11/19/2014); Cataract removal with implant (Left, 11/08/2016); Cataract removal with implant (12/06/2016); tracheostomy (11/14/2017); Gastrostomy tube placement (01/08/2018); other surgical history; Breast biopsy (Right, 11/18/1987); Hysterectomy (05/01/2000); Ovary removal (Bilateral, 05/2000); and Colonoscopy (N/A, 7/22/2021).    Home Medications:  Prior to Admission medications    Medication Sig Start Date End Date Taking? Authorizing Provider   metoprolol succinate (TOPROL XL) 25 MG extended release tablet TAKE 1 AND 1/2 TABLET BY MOUTH DAILY 2/5/25   Tanya Lee APRN - NP   ezetimibe (ZETIA) 10 MG tablet Take 1 tablet by mouth daily 12/26/24   Finn Jaimes MD   atorvastatin (LIPITOR) 40 MG tablet Take 1 tablet by mouth daily 12/19/24   Finn Jaimes MD   loratadine (CLARITIN) 10 MG tablet Take 1 tablet by mouth daily    ProviderEhsan MD   Calcium Carbonate-Vit D-Min (CALTRATE PLUS PO) Take by mouth Chocolate chews    Ehsan Willis MD   docusate sodium

## 2025-05-22 ENCOUNTER — OFFICE VISIT (OUTPATIENT)
Dept: PODIATRY | Age: 75
End: 2025-05-22
Payer: MEDICARE

## 2025-05-22 VITALS
HEART RATE: 82 BPM | SYSTOLIC BLOOD PRESSURE: 118 MMHG | BODY MASS INDEX: 25.91 KG/M2 | WEIGHT: 132 LBS | DIASTOLIC BLOOD PRESSURE: 68 MMHG | HEIGHT: 60 IN

## 2025-05-22 DIAGNOSIS — L84 CORNS AND CALLOSITIES: ICD-10-CM

## 2025-05-22 DIAGNOSIS — B35.1 DERMATOPHYTOSIS OF NAIL: ICD-10-CM

## 2025-05-22 DIAGNOSIS — M79.674 PAIN IN TOES OF BOTH FEET: Primary | ICD-10-CM

## 2025-05-22 DIAGNOSIS — M79.675 PAIN IN TOES OF BOTH FEET: Primary | ICD-10-CM

## 2025-05-22 PROCEDURE — 11055 PARING/CUTG B9 HYPRKER LES 1: CPT | Performed by: PODIATRIST

## 2025-05-22 PROCEDURE — 11720 DEBRIDE NAIL 1-5: CPT | Performed by: PODIATRIST

## 2025-05-22 NOTE — PROGRESS NOTES
Foot Care Worksheet  PCP: Finn Jaimes MD  Last visit: 12 / 19 / 2024    Nail description: Thick , Yellow , Crumbly , Marked limitation of ambulation     Pain resulting from thickened and dystrophy of nail plate Yes    Nails involved  Right   2  (T5-T9)  Left     2  (TA-T4)    Q7 1 Class A Finding - Non traumatic amputation of foot No    Q8 2 Class B Findings - Absent DP pulse No, Absent PT pulse No, Advanced tropic changes (3 required) Yes    Decrease hair growth Yes, Nail changes/thickening Yes, Pigmented changes/discoloration Yes, Skin texture (thin, shiny) No, Skin color (rubor/redness) No    Q9 1 Class B and 2 Class C Findings  Claudication No, Temperature change Yes, Paresthesia No, Burning No, Edema Yes    Subjective:  Roseann Bazan is a 75 y.o. female who presents to the office today for foot care.  Pain at nails and callous.  Currently denies F/C/N/V.     Allergies   Allergen Reactions    Sulfa Antibiotics Swelling    Cephalexin Other (See Comments)     Very sleepy and sick after taking it     Ciprofloxacin      Does not set well with taste d/t g-tube placement    E-Mycin [Erythromycin] Nausea And Vomiting    Penicillins      Childhood.       Past Medical History:   Diagnosis Date    Allergic rhinitis     GERD (gastroesophageal reflux disease)     Hemangioma of face     And arm.    Impaired fasting blood sugar     Internal hemorrhoids     Irritable bowel syndrome     Menometrorrhagia     Severe.    Mitral valve prolapse     Obstructive sleep apnea     non compliant with cpap    Osteoarthritis     Sigmoid diverticulosis     Supraventricular tachycardia        Prior to Admission medications    Medication Sig Start Date End Date Taking? Authorizing Provider   ezetimibe (ZETIA) 10 MG tablet Take 1 tablet by mouth daily 3/19/25  Yes Sohan Moore MD   metoprolol succinate (TOPROL XL) 25 MG extended release tablet TAKE 1 AND 1/2 TABLET BY MOUTH DAILY 2/5/25  Yes Tanya Lee APRN - [facility]   loratadine

## 2025-05-23 ENCOUNTER — HOSPITAL ENCOUNTER (OUTPATIENT)
Dept: MAMMOGRAPHY | Age: 75
Discharge: HOME | End: 2025-05-25
Payer: MEDICARE

## 2025-05-23 VITALS — BODY MASS INDEX: 25.32 KG/M2 | HEIGHT: 60 IN | WEIGHT: 129 LBS

## 2025-05-23 DIAGNOSIS — Z12.31 VISIT FOR SCREENING MAMMOGRAM: ICD-10-CM

## 2025-05-23 PROCEDURE — 77063 BREAST TOMOSYNTHESIS BI: CPT

## 2025-06-02 ENCOUNTER — HOSPITAL ENCOUNTER (OUTPATIENT)
Age: 75
Discharge: HOME | End: 2025-06-02
Payer: MEDICARE

## 2025-06-02 DIAGNOSIS — R73.01 IMPAIRED FASTING BLOOD SUGAR: ICD-10-CM

## 2025-06-02 DIAGNOSIS — I10 PRIMARY HYPERTENSION: ICD-10-CM

## 2025-06-02 DIAGNOSIS — E78.5 HYPERLIPIDEMIA, UNSPECIFIED HYPERLIPIDEMIA TYPE: ICD-10-CM

## 2025-06-02 LAB
ALBUMIN SERPL-MCNC: 4.2 G/DL (ref 3.5–5.2)
ALBUMIN/GLOB SERPL: 1.4 {RATIO} (ref 1–2.5)
ALP SERPL-CCNC: 75 U/L (ref 35–104)
ALT SERPL-CCNC: 18 U/L (ref 10–35)
ANION GAP SERPL CALCULATED.3IONS-SCNC: 11 MMOL/L (ref 9–16)
AST SERPL-CCNC: 21 U/L (ref 10–35)
BASOPHILS # BLD: 0.04 K/UL (ref 0–0.2)
BASOPHILS NFR BLD: 1 % (ref 0–2)
BILIRUB SERPL-MCNC: 0.4 MG/DL (ref 0–1.2)
BUN SERPL-MCNC: 24 MG/DL (ref 8–23)
BUN/CREAT SERPL: 30 (ref 9–20)
CALCIUM SERPL-MCNC: 9.3 MG/DL (ref 8.6–10.4)
CHLORIDE SERPL-SCNC: 106 MMOL/L (ref 98–107)
CHOLEST SERPL-MCNC: 186 MG/DL (ref 0–199)
CHOLESTEROL/HDL RATIO: 3.9
CO2 SERPL-SCNC: 26 MMOL/L (ref 20–31)
CREAT SERPL-MCNC: 0.8 MG/DL (ref 0.6–0.9)
EOSINOPHIL # BLD: 0.16 K/UL (ref 0–0.44)
EOSINOPHILS RELATIVE PERCENT: 3 % (ref 1–4)
ERYTHROCYTE [DISTWIDTH] IN BLOOD BY AUTOMATED COUNT: 13.6 % (ref 11.8–14.4)
EST. AVERAGE GLUCOSE BLD GHB EST-MCNC: 131 MG/DL
GFR, ESTIMATED: 76 ML/MIN/1.73M2
GLUCOSE SERPL-MCNC: 103 MG/DL (ref 74–99)
HBA1C MFR BLD: 6.2 % (ref 4–6)
HCT VFR BLD AUTO: 42.2 % (ref 36.3–47.1)
HDLC SERPL-MCNC: 48 MG/DL
HGB BLD-MCNC: 14.1 G/DL (ref 11.9–15.1)
IMM GRANULOCYTES # BLD AUTO: 0.03 K/UL (ref 0–0.3)
IMM GRANULOCYTES NFR BLD: 1 %
LDLC SERPL CALC-MCNC: 116 MG/DL (ref 0–100)
LYMPHOCYTES NFR BLD: 1.77 K/UL (ref 1.1–3.7)
LYMPHOCYTES RELATIVE PERCENT: 30 % (ref 24–43)
MCH RBC QN AUTO: 30.5 PG (ref 25.2–33.5)
MCHC RBC AUTO-ENTMCNC: 33.4 G/DL (ref 25.2–33.5)
MCV RBC AUTO: 91.3 FL (ref 82.6–102.9)
MONOCYTES NFR BLD: 0.55 K/UL (ref 0.1–1.2)
MONOCYTES NFR BLD: 9 % (ref 3–12)
NEUTROPHILS NFR BLD: 56 % (ref 36–65)
NEUTS SEG NFR BLD: 3.35 K/UL (ref 1.5–8.1)
NRBC BLD-RTO: 0 PER 100 WBC
PLATELET # BLD AUTO: 205 K/UL (ref 138–453)
PMV BLD AUTO: 9.6 FL (ref 8.1–13.5)
POTASSIUM SERPL-SCNC: 4.2 MMOL/L (ref 3.7–5.3)
PROT SERPL-MCNC: 7.3 G/DL (ref 6.6–8.7)
RBC # BLD AUTO: 4.62 M/UL (ref 3.95–5.11)
SODIUM SERPL-SCNC: 143 MMOL/L (ref 136–145)
TRIGL SERPL-MCNC: 111 MG/DL
VLDLC SERPL CALC-MCNC: 22 MG/DL (ref 1–30)
WBC OTHER # BLD: 5.9 K/UL (ref 3.5–11.3)

## 2025-06-02 PROCEDURE — 36415 COLL VENOUS BLD VENIPUNCTURE: CPT

## 2025-06-02 PROCEDURE — 80053 COMPREHEN METABOLIC PANEL: CPT

## 2025-06-02 PROCEDURE — 83036 HEMOGLOBIN GLYCOSYLATED A1C: CPT

## 2025-06-02 PROCEDURE — 80061 LIPID PANEL: CPT

## 2025-06-02 PROCEDURE — 85025 COMPLETE CBC W/AUTO DIFF WBC: CPT

## 2025-06-09 ENCOUNTER — OFFICE VISIT (OUTPATIENT)
Dept: FAMILY MEDICINE CLINIC | Age: 75
End: 2025-06-09
Payer: MEDICARE

## 2025-06-09 VITALS
OXYGEN SATURATION: 100 % | SYSTOLIC BLOOD PRESSURE: 132 MMHG | BODY MASS INDEX: 25.84 KG/M2 | DIASTOLIC BLOOD PRESSURE: 74 MMHG | WEIGHT: 131.6 LBS | HEART RATE: 72 BPM | HEIGHT: 60 IN

## 2025-06-09 DIAGNOSIS — R42 DIZZINESS: ICD-10-CM

## 2025-06-09 DIAGNOSIS — K57.30 SIGMOID DIVERTICULOSIS: ICD-10-CM

## 2025-06-09 DIAGNOSIS — R73.01 IMPAIRED FASTING BLOOD SUGAR: Primary | ICD-10-CM

## 2025-06-09 DIAGNOSIS — M54.50 CHRONIC BILATERAL LOW BACK PAIN WITHOUT SCIATICA: ICD-10-CM

## 2025-06-09 DIAGNOSIS — E78.5 HYPERLIPIDEMIA, UNSPECIFIED HYPERLIPIDEMIA TYPE: ICD-10-CM

## 2025-06-09 DIAGNOSIS — G47.33 OBSTRUCTIVE SLEEP APNEA: ICD-10-CM

## 2025-06-09 DIAGNOSIS — Q27.39 ARTERIOVENOUS MALFORMATION, OTHER SITE: ICD-10-CM

## 2025-06-09 DIAGNOSIS — J30.1 SEASONAL ALLERGIC RHINITIS DUE TO POLLEN: ICD-10-CM

## 2025-06-09 DIAGNOSIS — G89.29 CHRONIC BILATERAL LOW BACK PAIN WITHOUT SCIATICA: ICD-10-CM

## 2025-06-09 DIAGNOSIS — Z86.0100 HISTORY OF COLON POLYPS: ICD-10-CM

## 2025-06-09 DIAGNOSIS — I10 PRIMARY HYPERTENSION: ICD-10-CM

## 2025-06-09 DIAGNOSIS — I47.10 SUPRAVENTRICULAR TACHYCARDIA: ICD-10-CM

## 2025-06-09 PROCEDURE — G8399 PT W/DXA RESULTS DOCUMENT: HCPCS | Performed by: FAMILY MEDICINE

## 2025-06-09 PROCEDURE — 1090F PRES/ABSN URINE INCON ASSESS: CPT | Performed by: FAMILY MEDICINE

## 2025-06-09 PROCEDURE — 99214 OFFICE O/P EST MOD 30 MIN: CPT | Performed by: FAMILY MEDICINE

## 2025-06-09 PROCEDURE — 1036F TOBACCO NON-USER: CPT | Performed by: FAMILY MEDICINE

## 2025-06-09 PROCEDURE — 1159F MED LIST DOCD IN RCRD: CPT | Performed by: FAMILY MEDICINE

## 2025-06-09 PROCEDURE — 1123F ACP DISCUSS/DSCN MKR DOCD: CPT | Performed by: FAMILY MEDICINE

## 2025-06-09 PROCEDURE — 3078F DIAST BP <80 MM HG: CPT | Performed by: FAMILY MEDICINE

## 2025-06-09 PROCEDURE — 99213 OFFICE O/P EST LOW 20 MIN: CPT

## 2025-06-09 PROCEDURE — 3075F SYST BP GE 130 - 139MM HG: CPT | Performed by: FAMILY MEDICINE

## 2025-06-09 PROCEDURE — G8419 CALC BMI OUT NRM PARAM NOF/U: HCPCS | Performed by: FAMILY MEDICINE

## 2025-06-09 PROCEDURE — G8427 DOCREV CUR MEDS BY ELIG CLIN: HCPCS | Performed by: FAMILY MEDICINE

## 2025-06-09 SDOH — ECONOMIC STABILITY: FOOD INSECURITY: WITHIN THE PAST 12 MONTHS, THE FOOD YOU BOUGHT JUST DIDN'T LAST AND YOU DIDN'T HAVE MONEY TO GET MORE.: NEVER TRUE

## 2025-06-09 SDOH — ECONOMIC STABILITY: FOOD INSECURITY: WITHIN THE PAST 12 MONTHS, YOU WORRIED THAT YOUR FOOD WOULD RUN OUT BEFORE YOU GOT MONEY TO BUY MORE.: NEVER TRUE

## 2025-06-09 ASSESSMENT — PATIENT HEALTH QUESTIONNAIRE - PHQ9
SUM OF ALL RESPONSES TO PHQ QUESTIONS 1-9: 0
1. LITTLE INTEREST OR PLEASURE IN DOING THINGS: NOT AT ALL
2. FEELING DOWN, DEPRESSED OR HOPELESS: NOT AT ALL

## 2025-06-09 ASSESSMENT — ENCOUNTER SYMPTOMS
CONSTIPATION: 0
EYES NEGATIVE: 1
RESPIRATORY NEGATIVE: 1
GASTROINTESTINAL NEGATIVE: 1

## 2025-06-09 NOTE — PROGRESS NOTES
Subjective:      Patient ID: Roseann Bazan is a 75 y.o. female.    Hypertension    Hyperlipidemia  Associated symptoms include myalgias (left arm).   Skin Problem    Arm Pain        Routine follow up on chronic medical conditions, refills, and review of updated labs.  I have reviewed the patient's medical history in detail and updated the computerized patient record.      She is feeling fairly well at present. She has a white and yellow kitten since last visit.  \"Whit\".  Keeping her busy .         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  5 sclerotherapy treatments.  Some reconstruction surgery of tracheostomy closure and lower left lip. She has completed procedures and is now working with dentist.  She needs some bone addition to mandibular area by description.  Planning bracing and other dental work after that. invisiline braces being used at present.  Night retainers in use now.     She did have some bone graft under 6 of her mandibular incisors area by report.  Has healed well.  No mouth or oral concerns.  A little tight at the left corner of the mouth with retainer in.  Not biting the cheek.     Allergies mildly active at present.  Intermittent rhinorrhea at present.  Starting claritin daily.  Interested in nasal drainage at present.  Consider flonase at present.   No significant gerd symptoms.  Non compliant with cpap. She thinks interventions have improved this condition.  No palpitations of concern. No bladder issues.  Using prunes to help with constipation.  Working well for her.  Increased gas/bloating/flatulence at present.     She had some recent concerns for diverticulitis, but seems she was backed up with some constipation and had 3 large bowel movements. No pain or fever.    Past Medical History:   Diagnosis Date    Allergic rhinitis     GERD (gastroesophageal reflux disease)     Hemangioma of face     And arm.    Impaired fasting blood sugar     Internal hemorrhoids

## 2025-06-27 ENCOUNTER — HOSPITAL ENCOUNTER (OUTPATIENT)
Dept: GENERAL RADIOLOGY | Age: 75
Discharge: HOME | End: 2025-06-29
Payer: MEDICARE

## 2025-06-27 ENCOUNTER — RESULTS FOLLOW-UP (OUTPATIENT)
Dept: PRIMARY CARE CLINIC | Age: 75
End: 2025-06-27

## 2025-06-27 ENCOUNTER — OFFICE VISIT (OUTPATIENT)
Dept: PRIMARY CARE CLINIC | Age: 75
End: 2025-06-27
Payer: MEDICARE

## 2025-06-27 VITALS
WEIGHT: 129.8 LBS | RESPIRATION RATE: 18 BRPM | OXYGEN SATURATION: 96 % | SYSTOLIC BLOOD PRESSURE: 110 MMHG | TEMPERATURE: 98.8 F | HEART RATE: 100 BPM | BODY MASS INDEX: 25.48 KG/M2 | HEIGHT: 60 IN | DIASTOLIC BLOOD PRESSURE: 70 MMHG

## 2025-06-27 DIAGNOSIS — M25.511 ACUTE PAIN OF RIGHT SHOULDER: Primary | ICD-10-CM

## 2025-06-27 DIAGNOSIS — M25.511 ACUTE PAIN OF RIGHT SHOULDER: ICD-10-CM

## 2025-06-27 PROCEDURE — 73030 X-RAY EXAM OF SHOULDER: CPT

## 2025-06-27 PROCEDURE — 99214 OFFICE O/P EST MOD 30 MIN: CPT

## 2025-06-27 RX ORDER — PREDNISONE 20 MG/1
20 TABLET ORAL 2 TIMES DAILY
Qty: 10 TABLET | Refills: 0 | Status: SHIPPED | OUTPATIENT
Start: 2025-06-27 | End: 2025-07-02

## 2025-06-27 NOTE — PROGRESS NOTES
[place] [address]                        Telephone [phone]             Fax [phone]       Roseann Bazan  :  1950  Age:  75 y.o.   MRN:  [id number]  Date of visit:  2025       Assessment and Plan:    Acute pain of right shoulder  Sprain/strain vs. other  I reviewed the x-ray images of the right shoulder with the patient.  I advised the patient that there were no obvious fractures to my reading.  She will be contacted when the radiologist's interpretation of her x-rays is available.   A 5-day course of Prednisone was prescribed.  - predniSONE (DELTASONE) 20 MG tablet; Take 1 tablet by mouth 2 times daily for 5 days  Dispense: 10 tablet; Refill: 0    - Arm Sling was provided.        Procedures    Arm Sling     Patient was supplied a Simple Arm Sling.  This retail item was supplied to provide functional support and assist in protecting the affected area.      Verbal and written instructions for the use of and application of this item were provided.  The patient was educated and fit by a healthcare professional with expert knowledge and specialization in brace application.  They were instructed to contact the office immediately should the equipment result in increased pain, decreased sensation, increased swelling or worsening of the condition.            Subjective:    Roseann Bazan is a 75 y.o. female who presents to [facility] today (2025) for evaluation of:  Shoulder Pain (Right side shoulder pain,  was cleaning the other day but has no other complaints, feels locked. )       History of Present Illness  The patient presents for evaluation of right shoulder pain.    She reports a sudden onset of right shoulder pain that began on Wednesday night around 9:30 or 10:00 PM on 2025. The pain was initially mild but progressively worsened, prompting her to apply ice and

## 2025-07-15 ENCOUNTER — TELEMEDICINE (OUTPATIENT)
Dept: FAMILY MEDICINE CLINIC | Age: 75
End: 2025-07-15
Payer: MEDICARE

## 2025-07-15 DIAGNOSIS — Z00.00 MEDICARE ANNUAL WELLNESS VISIT, SUBSEQUENT: Primary | ICD-10-CM

## 2025-07-15 PROCEDURE — 1123F ACP DISCUSS/DSCN MKR DOCD: CPT | Performed by: FAMILY MEDICINE

## 2025-07-15 PROCEDURE — G0439 PPPS, SUBSEQ VISIT: HCPCS | Performed by: FAMILY MEDICINE

## 2025-07-15 PROCEDURE — 1159F MED LIST DOCD IN RCRD: CPT | Performed by: FAMILY MEDICINE

## 2025-07-15 ASSESSMENT — PATIENT HEALTH QUESTIONNAIRE - PHQ9
1. LITTLE INTEREST OR PLEASURE IN DOING THINGS: NOT AT ALL
SUM OF ALL RESPONSES TO PHQ QUESTIONS 1-9: 0
2. FEELING DOWN, DEPRESSED OR HOPELESS: NOT AT ALL
SUM OF ALL RESPONSES TO PHQ QUESTIONS 1-9: 0

## 2025-07-15 ASSESSMENT — LIFESTYLE VARIABLES
HOW OFTEN DO YOU HAVE A DRINK CONTAINING ALCOHOL: NEVER
HOW MANY STANDARD DRINKS CONTAINING ALCOHOL DO YOU HAVE ON A TYPICAL DAY: PATIENT DOES NOT DRINK

## 2025-07-15 NOTE — PATIENT INSTRUCTIONS
Learning About Being Active as an Older Adult  Why is being active important as you get older?     Being active is one of the best things you can do for your health. And it's never too late to start. Being active--or getting active, if you aren't already--has definite benefits. It can:  Give you more energy,  Keep your mind sharp.  Improve balance to reduce your risk of falls.  Help you manage chronic illness with fewer medicines.  No matter how old you are, how fit you are, or what health problems you have, there is a form of activity that will work for you. And the more physical activity you can do, the better your overall health will be.  What kinds of activity can help you stay healthy?  Being more active will make your daily activities easier. Physical activity includes planned exercise and things you do in daily life. There are four types of activity:  Aerobic.  Doing aerobic activity makes your heart and lungs strong.  Includes walking, dancing, and gardening.  Aim for at least 2½ hours spread throughout the week.  It improves your energy and can help you sleep better.  Muscle-strengthening.  This type of activity can help maintain muscle and strengthen bones.  Includes climbing stairs, using resistance bands, and lifting or carrying heavy loads.  Aim for at least twice a week.  It can help protect the knees and other joints.  Stretching.  Stretching gives you better range of motion in joints and muscles.  Includes upper arm stretches, calf stretches, and gentle yoga.  Aim for at least twice a week, preferably after your muscles are warmed up from other activities.  It can help you function better in daily life.  Balancing.  This helps you stay coordinated and have good posture.  Includes heel-to-toe walking, keisha chi, and certain types of yoga.  Aim for at least 3 days a week.  It can reduce your risk of falling.  Even if you have a hard time meeting the recommendations, it's better to be more active

## 2025-07-15 NOTE — PROGRESS NOTES
Medicare Annual Wellness Visit    Roseann Bazan is here for Medicare AWV    Assessment & Plan        No follow-ups on file.     Subjective       Patient's complete Health Risk Assessment and screening values have been reviewed and are found in Flowsheets. The following problems were reviewed today and where indicated follow up appointments were made and/or referrals ordered.    Positive Risk Factor Screenings with Interventions:              Inactivity:  On average, how many days per week do you engage in moderate to strenuous exercise (like a brisk walk)?: 0 days (!) Abnormal  On average, how many minutes do you engage in exercise at this level?: 0 min    Interventions:  See AVS for additional education material                          Objective    Patient-Reported Vitals  Patient-Reported Weight: 129 lbs  Patient-Reported Height: 5'                 Allergies   Allergen Reactions    Sulfa Antibiotics Swelling    Cephalexin Other (See Comments)     Very sleepy and sick after taking it     Ciprofloxacin      Does not set well with taste d/t g-tube placement    E-Mycin [Erythromycin] Nausea And Vomiting    Penicillins      Childhood.     Prior to Visit Medications    Medication Sig Taking? Authorizing Provider   ezetimibe (ZETIA) 10 MG tablet Take 1 tablet by mouth daily  Sohan Moore MD   metoprolol succinate (TOPROL XL) 25 MG extended release tablet TAKE 1 AND 1/2 TABLET BY MOUTH DAILY  Tanya Lee APRN - NP   loratadine (CLARITIN) 10 MG tablet Take 1 tablet by mouth daily  Ehsan Willis MD   Calcium Carbonate-Vit D-Min (CALTRATE PLUS PO) Take by mouth Chocolate chews  Ehsan Willis MD   docusate sodium (COLACE) 100 MG capsule Take 1 capsule by mouth as needed for Constipation  ProviderEhsan MD       CareTeam (Including outside providers/suppliers regularly involved in providing care):   Patient Care Team:  Finn Jaimes MD as PCP - General (Family Medicine)  Finn Jaimes MD as

## 2025-08-05 ENCOUNTER — OFFICE VISIT (OUTPATIENT)
Dept: PODIATRY | Age: 75
End: 2025-08-05
Payer: MEDICARE

## 2025-08-05 VITALS
BODY MASS INDEX: 25.62 KG/M2 | SYSTOLIC BLOOD PRESSURE: 124 MMHG | WEIGHT: 131.2 LBS | HEART RATE: 72 BPM | RESPIRATION RATE: 20 BRPM | DIASTOLIC BLOOD PRESSURE: 66 MMHG

## 2025-08-05 DIAGNOSIS — L84 CORNS AND CALLOSITIES: ICD-10-CM

## 2025-08-05 DIAGNOSIS — B35.1 DERMATOPHYTOSIS OF NAIL: Primary | ICD-10-CM

## 2025-08-05 DIAGNOSIS — M79.675 PAIN IN TOES OF BOTH FEET: ICD-10-CM

## 2025-08-05 DIAGNOSIS — M79.674 PAIN IN TOES OF BOTH FEET: ICD-10-CM

## 2025-08-05 PROCEDURE — 99999 PR OFFICE/OUTPT VISIT,PROCEDURE ONLY: CPT | Performed by: PODIATRIST

## 2025-08-05 PROCEDURE — 11055 PARING/CUTG B9 HYPRKER LES 1: CPT | Performed by: PODIATRIST

## 2025-08-05 PROCEDURE — 11720 DEBRIDE NAIL 1-5: CPT | Performed by: PODIATRIST

## (undated) DEVICE — TRAP SURG QUAD PARABOLA SLOT DSGN SFTY SCRN TRAPEASE

## (undated) DEVICE — MERCY DEFIANCE ENDO KIT: Brand: MEDLINE INDUSTRIES, INC.

## (undated) DEVICE — 60 ML SYRINGE REGULAR TIP: Brand: MONOJECT

## (undated) DEVICE — FORCEPS BX L240CM JAW DIA2.2MM RAD JAW 4 HOT DISP

## (undated) DEVICE — SNARE ENDOSCP L240CM SHTH DIA2.4MM LOOP W20MM MIN WRK CHN

## (undated) DEVICE — 9165 UNIVERSAL PATIENT PLATE: Brand: 3M™

## (undated) DEVICE — LINE SAMP O2 6.5FT W/FEMALE CONN F/ADULT CAPNOLINE PLUS

## (undated) DEVICE — CONNECTOR TBNG AUX H2O JET DISP FOR OLY 160/180 SER

## (undated) DEVICE — 1200CC GUARDIAN II: Brand: GUARDIAN